# Patient Record
Sex: MALE | Race: WHITE | NOT HISPANIC OR LATINO | Employment: OTHER | URBAN - METROPOLITAN AREA
[De-identification: names, ages, dates, MRNs, and addresses within clinical notes are randomized per-mention and may not be internally consistent; named-entity substitution may affect disease eponyms.]

---

## 2023-08-29 ENCOUNTER — HOSPITAL ENCOUNTER (OUTPATIENT)
Dept: DATA CONVERSION | Facility: HOSPITAL | Age: 83
End: 2023-08-29

## 2023-08-29 DIAGNOSIS — I48.20 CHRONIC ATRIAL FIBRILLATION, UNSPECIFIED (MULTI): ICD-10-CM

## 2023-09-06 ENCOUNTER — HOSPITAL ENCOUNTER (OUTPATIENT)
Dept: DATA CONVERSION | Facility: HOSPITAL | Age: 83
Discharge: HOME | End: 2023-09-06
Payer: MEDICARE

## 2023-09-06 DIAGNOSIS — I48.20 CHRONIC ATRIAL FIBRILLATION, UNSPECIFIED (MULTI): ICD-10-CM

## 2023-09-06 DIAGNOSIS — R73.01 IMPAIRED FASTING GLUCOSE: ICD-10-CM

## 2023-09-06 DIAGNOSIS — I50.9 HEART FAILURE, UNSPECIFIED (MULTI): ICD-10-CM

## 2023-09-06 DIAGNOSIS — M35.3 POLYMYALGIA RHEUMATICA (MULTI): ICD-10-CM

## 2023-09-06 DIAGNOSIS — E78.00 PURE HYPERCHOLESTEROLEMIA, UNSPECIFIED: ICD-10-CM

## 2023-09-06 LAB
ALBUMIN SERPL-MCNC: 4 GM/DL (ref 3.5–5)
ALBUMIN/GLOB SERPL: 1.7 RATIO (ref 1.5–3)
ALP BLD-CCNC: 58 U/L (ref 35–125)
ALT SERPL-CCNC: 12 U/L (ref 5–40)
ANION GAP SERPL CALCULATED.3IONS-SCNC: 15 MMOL/L (ref 0–19)
APPEARANCE PLAS: NORMAL
AST SERPL-CCNC: 17 U/L (ref 5–40)
BILIRUB SERPL-MCNC: 1.1 MG/DL (ref 0.1–1.2)
BUN SERPL-MCNC: 25 MG/DL (ref 8–25)
BUN/CREAT SERPL: 22.7 RATIO (ref 8–21)
CALCIUM SERPL-MCNC: 9.3 MG/DL (ref 8.5–10.4)
CHLORIDE SERPL-SCNC: 107 MMOL/L (ref 97–107)
CHOLEST SERPL-MCNC: 158 MG/DL (ref 133–200)
CHOLEST/HDLC SERPL: 2.5 RATIO
CO2 SERPL-SCNC: 22 MMOL/L (ref 24–31)
COLOR SPUN FLD: YELLOW
CREAT SERPL-MCNC: 1.1 MG/DL (ref 0.4–1.6)
DEPRECATED RDW RBC AUTO: 50.4 FL (ref 37–54)
ERYTHROCYTE [DISTWIDTH] IN BLOOD BY AUTOMATED COUNT: 14.8 % (ref 11.7–15)
FASTING STATUS PATIENT QL REPORTED: NORMAL
GFR SERPL CREATININE-BSD FRML MDRD: 67 ML/MIN/1.73 M2
GLOBULIN SER-MCNC: 2.3 G/DL (ref 1.9–3.7)
GLUCOSE SERPL-MCNC: 106 MG/DL (ref 65–99)
HBA1C MFR BLD: 5.8 % (ref 4–6)
HCT VFR BLD AUTO: 43.3 % (ref 41–50)
HDLC SERPL-MCNC: 63 MG/DL
HGB BLD-MCNC: 14 GM/DL (ref 13.5–16.5)
LDLC SERPL CALC-MCNC: 73 MG/DL (ref 65–130)
MCH RBC QN AUTO: 30 PG (ref 26–34)
MCHC RBC AUTO-ENTMCNC: 32.3 % (ref 31–37)
MCV RBC AUTO: 92.9 FL (ref 80–100)
NRBC BLD-RTO: 0 /100 WBC
NT-PROBNP SERPL-MCNC: 1299 PG/ML (ref 0–852)
PLATELET # BLD AUTO: 211 K/UL (ref 150–450)
PMV BLD AUTO: 9.6 CU (ref 7–12.6)
POTASSIUM SERPL-SCNC: 4.1 MMOL/L (ref 3.4–5.1)
PROT SERPL-MCNC: 6.3 G/DL (ref 5.9–7.9)
RBC # BLD AUTO: 4.66 M/UL (ref 4.5–5.5)
SODIUM SERPL-SCNC: 144 MMOL/L (ref 133–145)
TRIGL SERPL-MCNC: 108 MG/DL (ref 40–150)
WBC # BLD AUTO: 10 K/UL (ref 4.5–11)

## 2023-09-13 PROBLEM — I63.9 STROKE (MULTI): Status: ACTIVE | Noted: 2023-09-13

## 2023-09-13 PROBLEM — I48.20 CHRONIC ATRIAL FIBRILLATION (MULTI): Status: ACTIVE | Noted: 2023-09-13

## 2023-09-13 PROBLEM — M51.36 DDD (DEGENERATIVE DISC DISEASE), LUMBAR: Status: ACTIVE | Noted: 2023-09-13

## 2023-09-13 PROBLEM — K40.20 BILATERAL INGUINAL HERNIA WITHOUT OBSTRUCTION OR GANGRENE: Status: ACTIVE | Noted: 2023-09-13

## 2023-09-13 PROBLEM — E55.9 VITAMIN D DEFICIENCY: Status: ACTIVE | Noted: 2023-09-13

## 2023-09-13 PROBLEM — B35.1 ONYCHOMYCOSIS: Status: ACTIVE | Noted: 2019-01-22

## 2023-09-13 PROBLEM — Z86.73 HISTORY OF TIA (TRANSIENT ISCHEMIC ATTACK): Status: ACTIVE | Noted: 2023-09-13

## 2023-09-13 PROBLEM — J45.51 SEVERE PERSISTENT ASTHMA WITH ACUTE EXACERBATION (MULTI): Status: ACTIVE | Noted: 2023-09-13

## 2023-09-13 PROBLEM — E78.00 PURE HYPERCHOLESTEROLEMIA: Status: ACTIVE | Noted: 2023-09-13

## 2023-09-13 PROBLEM — M51.369 DDD (DEGENERATIVE DISC DISEASE), LUMBAR: Status: ACTIVE | Noted: 2023-09-13

## 2023-09-13 PROBLEM — G25.0 ESSENTIAL TREMOR: Status: ACTIVE | Noted: 2023-09-13

## 2023-09-13 PROBLEM — E78.5 HYPERLIPIDEMIA, UNSPECIFIED: Status: ACTIVE | Noted: 2019-06-20

## 2023-09-13 PROBLEM — N40.1 BPH ASSOCIATED WITH NOCTURIA: Status: ACTIVE | Noted: 2021-08-11

## 2023-09-13 PROBLEM — M19.90 DEGENERATIVE JOINT DISEASE: Status: ACTIVE | Noted: 2023-09-13

## 2023-09-13 PROBLEM — R35.1 BPH ASSOCIATED WITH NOCTURIA: Status: ACTIVE | Noted: 2021-08-11

## 2023-09-13 PROBLEM — M17.0 OSTEOARTHRITIS OF BOTH KNEES: Status: ACTIVE | Noted: 2021-03-18

## 2023-09-13 RX ORDER — VIT A/VIT C/VIT E/ZINC/COPPER 4296-226
1 CAPSULE ORAL 2 TIMES DAILY
COMMUNITY

## 2023-09-13 RX ORDER — ATORVASTATIN CALCIUM 10 MG/1
1 TABLET, FILM COATED ORAL DAILY
COMMUNITY
End: 2024-05-17 | Stop reason: SDUPTHER

## 2023-09-13 RX ORDER — METOPROLOL TARTRATE 50 MG/1
50 TABLET ORAL
COMMUNITY
Start: 2018-06-19 | End: 2023-12-01 | Stop reason: SDUPTHER

## 2023-09-13 RX ORDER — FUROSEMIDE 20 MG/1
20 TABLET ORAL DAILY
COMMUNITY
Start: 2023-08-23 | End: 2023-12-01 | Stop reason: WASHOUT

## 2023-09-13 RX ORDER — VIT C/E/ZN/COPPR/LUTEIN/ZEAXAN 250MG-90MG
25 CAPSULE ORAL DAILY
COMMUNITY

## 2023-09-13 RX ORDER — METOPROLOL TARTRATE 25 MG/1
0.5 TABLET, FILM COATED ORAL 2 TIMES DAILY
COMMUNITY
End: 2024-05-27 | Stop reason: ALTCHOICE

## 2023-10-02 ENCOUNTER — OFFICE VISIT (OUTPATIENT)
Dept: CARDIOLOGY | Facility: CLINIC | Age: 83
End: 2023-10-02
Payer: MEDICARE

## 2023-10-02 VITALS
BODY MASS INDEX: 24.91 KG/M2 | WEIGHT: 211 LBS | HEIGHT: 77 IN | SYSTOLIC BLOOD PRESSURE: 116 MMHG | DIASTOLIC BLOOD PRESSURE: 70 MMHG

## 2023-10-02 DIAGNOSIS — I48.21 PERMANENT ATRIAL FIBRILLATION (MULTI): Primary | ICD-10-CM

## 2023-10-02 PROCEDURE — 99213 OFFICE O/P EST LOW 20 MIN: CPT | Performed by: INTERNAL MEDICINE

## 2023-10-02 PROCEDURE — 1126F AMNT PAIN NOTED NONE PRSNT: CPT | Performed by: INTERNAL MEDICINE

## 2023-10-02 PROCEDURE — 1159F MED LIST DOCD IN RCRD: CPT | Performed by: INTERNAL MEDICINE

## 2023-10-02 RX ORDER — OMEPRAZOLE 20 MG/1
20 TABLET, DELAYED RELEASE ORAL
COMMUNITY
End: 2023-12-01 | Stop reason: WASHOUT

## 2023-10-02 RX ORDER — PREDNISONE 10 MG/1
10 TABLET ORAL DAILY
COMMUNITY
End: 2023-11-02 | Stop reason: ALTCHOICE

## 2023-10-02 RX ORDER — LISINOPRIL 5 MG/1
5 TABLET ORAL DAILY
Qty: 90 TABLET | Refills: 0 | Status: SHIPPED | OUTPATIENT
Start: 2023-10-02 | End: 2024-01-22

## 2023-10-02 RX ORDER — TAMSULOSIN HYDROCHLORIDE 0.4 MG/1
0.4 CAPSULE ORAL
COMMUNITY
End: 2023-12-01 | Stop reason: WASHOUT

## 2023-10-02 ASSESSMENT — ENCOUNTER SYMPTOMS
MUSCULOSKELETAL NEGATIVE: 1
NEUROLOGICAL NEGATIVE: 1
EYES NEGATIVE: 1
ENDOCRINE NEGATIVE: 1
GASTROINTESTINAL NEGATIVE: 1
RESPIRATORY NEGATIVE: 1
CONSTITUTIONAL NEGATIVE: 1

## 2023-10-02 ASSESSMENT — PAIN SCALES - GENERAL: PAINLEVEL: 0-NO PAIN

## 2023-10-02 NOTE — PROGRESS NOTES
Subjective   NATASHA Mckinney is a 82 y.o. male.    Chief Complaint:  No chief complaint on file.    HPIHe got covid and is doing better.  His breathing is doing alright.  The legs are still swelling and is down as long has he is sitting.-  the echo shows the EF is in the 40's and the proBNP is 1300    Review of Systems   Constitutional: Negative.   HENT: Negative.     Eyes: Negative.    Respiratory: Negative.     Endocrine: Negative.    Skin: Negative.    Musculoskeletal: Negative.    Gastrointestinal: Negative.    Genitourinary: Negative.    Neurological: Negative.    All other systems reviewed and are negative.      Objective   Constitutional:       Appearance: Healthy appearance.   Eyes:      Pupils: Pupils are equal, round, and reactive to light.   Neck:      Vascular: JVD normal.   Pulmonary:      Breath sounds: Normal breath sounds.   Cardiovascular:      PMI at left midclavicular line. Normal rate. Irregularly irregular rhythm. Normal S1. Normal S2.       Murmurs: There is no murmur.      No gallop.  No click. No rub.   Pulses:     Intact distal pulses.   Edema:     Peripheral edema absent.   Abdominal:      Palpations: Abdomen is soft.      Tenderness: There is no abdominal tenderness.   Musculoskeletal:      Extremities: No clubbing present.Skin:     General: Skin is warm and dry.   Neurological:      General: No focal deficit present.         Lab Review:   Hospital Outpatient Visit on 09/06/2023   Component Date Value    WBC 09/06/2023 10.0     RBC 09/06/2023 4.66     Hemoglobin 09/06/2023 14.0     Hematocrit 09/06/2023 43.3     MCV 09/06/2023 92.9     MCH 09/06/2023 30.0     MCHC 09/06/2023 32.3     RDW-SD 09/06/2023 50.4     RDW-CV 09/06/2023 14.8     Platelets 09/06/2023 211     MPV 09/06/2023 9.6     nRBC 09/06/2023 0     Calcium 09/06/2023 9.3     AST 09/06/2023 17     Alkaline Phosphatase 09/06/2023 58     Total Bilirubin 09/06/2023 1.1     Total Protein 09/06/2023 6.3     Albumin 09/06/2023 4.0      Globulin, Total 09/06/2023 2.3     A/G Ratio 09/06/2023 1.7     Sodium 09/06/2023 144     Potassium 09/06/2023 4.1     Chloride 09/06/2023 107     Bicarbonate 09/06/2023 22 (L)     Anion Gap 09/06/2023 15     Urea Nitrogen 09/06/2023 25     Creatinine 09/06/2023 1.1     Urea Nitrogen/Creatinine* 09/06/2023 22.7 (H)     Glucose 09/06/2023 106 (H)     ALT (SGPT) 09/06/2023 12     ESTIMATED GFR 09/06/2023 67     Hemoglobin A1C 09/06/2023 5.8     SERUM COLOR 09/06/2023 YELLOW     SERUM CLARITY 09/06/2023 SLIGHTLY LIPEMIC     Is the patient fasting? 09/06/2023 FASTING     Cholesterol 09/06/2023 158     Triglycerides 09/06/2023 108     HDL 09/06/2023 63     LDL Calculated 09/06/2023 73     Cholesterol/HDL Ratio 09/06/2023 2.5    Hospital Outpatient Visit on 09/06/2023   Component Date Value    Pro-BNP 09/06/2023 1,299 (H)      Hospital Outpatient Visit on 09/06/2023   Component Date Value    WBC 09/06/2023 10.0     RBC 09/06/2023 4.66     Hemoglobin 09/06/2023 14.0     Hematocrit 09/06/2023 43.3     MCV 09/06/2023 92.9     MCH 09/06/2023 30.0     MCHC 09/06/2023 32.3     RDW-SD 09/06/2023 50.4     RDW-CV 09/06/2023 14.8     Platelets 09/06/2023 211     MPV 09/06/2023 9.6     nRBC 09/06/2023 0     Calcium 09/06/2023 9.3     AST 09/06/2023 17     Alkaline Phosphatase 09/06/2023 58     Total Bilirubin 09/06/2023 1.1     Total Protein 09/06/2023 6.3     Albumin 09/06/2023 4.0     Globulin, Total 09/06/2023 2.3     A/G Ratio 09/06/2023 1.7     Sodium 09/06/2023 144     Potassium 09/06/2023 4.1     Chloride 09/06/2023 107     Bicarbonate 09/06/2023 22 (L)     Anion Gap 09/06/2023 15     Urea Nitrogen 09/06/2023 25     Creatinine 09/06/2023 1.1     Urea Nitrogen/Creatinine* 09/06/2023 22.7 (H)     Glucose 09/06/2023 106 (H)     ALT (SGPT) 09/06/2023 12     ESTIMATED GFR 09/06/2023 67     Hemoglobin A1C 09/06/2023 5.8     SERUM COLOR 09/06/2023 YELLOW     SERUM CLARITY 09/06/2023 SLIGHTLY LIPEMIC     Is the patient fasting?  09/06/2023 FASTING     Cholesterol 09/06/2023 158     Triglycerides 09/06/2023 108     HDL 09/06/2023 63     LDL Calculated 09/06/2023 73     Cholesterol/HDL Ratio 09/06/2023 2.5    Hospital Outpatient Visit on 09/06/2023   Component Date Value    Pro-BNP 09/06/2023 1,299 (H)    Legacy Encounter on 05/30/2023   Component Date Value    ANTI-NUCLEAR ANTIBODY (A* 05/30/2023  (A)                     Value:POSITIVE  Reference range: NEGATIVE      The Antinuclear Antibody (CHRISTINE) test was performed using    indirect immunofluorescence assay with HEp-2 cells slide.  Test performed at:  [facility] [address] [address] [facility] [address]      CHRISTINE Titer 05/30/2023                      Value:1:160  Reference range: <1:80    Test performed at:  [facility] [address] [facility] [address]      CHRISTINE Pattern 05/30/2023                      Value:HOMOGENEOUS    Test performed at:  [facility] [address] [address] [facility] [address]  Performed at [facility],[address] [facility] [address]      CRP 05/30/2023 5.5 (H)     Differential Type 05/30/2023 AUTO DIFF     Immature Granulocyte %, * 05/30/2023 0.50     Neutrophil 05/30/2023 75.50 (H)     Lymphocytes % 05/30/2023 11.60 (L)     Monocytes % 05/30/2023 11.10 (H)     Eosinophil 05/30/2023 1.00     Basophils % 05/30/2023 0.30     Immature Granulocytes Ab* 05/30/2023 0.06     Neutrophils Absolute 05/30/2023 9.46 (H)     Lymphocytes Absolute 05/30/2023 1.45     Monocytes Absolute 05/30/2023 1.39 (H)     Eosinophils Absolute 05/30/2023 0.12     Basophils Absolute 05/30/2023 0.04     WBC 05/30/2023 12.5 (H)     RBC 05/30/2023 4.17 (L)     Hemoglobin 05/30/2023 11.9 (L)     Hematocrit 05/30/2023 37.1 (L)     MCV 05/30/2023 89.0     MCH 05/30/2023 28.5     MCHC 05/30/2023 32.1     RDW-SD 05/30/2023 39.8     RDW-CV 05/30/2023 12.2     Platelets 05/30/2023 467 (H)     MPV 05/30/2023 8.8     nRBC 05/30/2023 0     ABSOLUTE NEUTROPHIL CALC* 05/30/2023 9.46     Sedimentation Rate 05/30/2023 69 (H)      Hemoglobin A1C 05/30/2023 5.7     Thyroid Stimulating Horm* 05/30/2023 2.04    Legacy Encounter on 05/26/2023   Component Date Value    LH - pH, urine (Data Con* 05/26/2023 5     LH - Performed by (Data * 05/26/2023 JONO     LH - Odor (Data Conversi* 05/26/2023 NON     LH - Nitrite (Data Conve* 05/26/2023 NEG     LH - Lot # (Data Convers* 05/26/2023 [id number]     LH - Leukocytes (Data Co* 05/26/2023 NEG     LH - Ketone (Data Conver* 05/26/2023 NEG     LH - Glucose (Data Conve* 05/26/2023 NRL     LH - Date of Expiration * 05/26/2023 01/31/2024     LH - Color (Data Convers* 05/26/2023 NON     LH - Blood (Data Convers* 05/26/2023 ABOUT 50     LH - Appearance (Data Co* 05/26/2023 CLEAR     LH - Bilirubin (Data Con* 05/26/2023 NEG     LH - Urobilinogen (Data * 05/26/2023 NRL     LH - Time of Test (Data * 05/26/2023 10:20AM     LH - Specific Gravity (D* 05/26/2023 1.025     LH - Protein (Data Conve* 05/26/2023 TRACE    Legacy Encounter on 05/26/2023   Component Date Value    Microscopic 05/26/2023 MANUAL MICROSCOPIC URINES     WBC, Urine 05/26/2023 OCC     RBC, Urine 05/26/2023 NONE SEEN     Color, Urine 05/26/2023 YELLOW     Appearance, Urine 05/26/2023 CLEAR     Specific Gravity, Urine 05/26/2023 >1.040     pH, Urine 05/26/2023 5.5     Leukocyte Esterase, Urine 05/26/2023 NEGATIVE     Nitrite, Urine 05/26/2023 NEGATIVE     Protein, Urine 05/26/2023 50 (A)     Glucose, Urine 05/26/2023 NEGATIVE     Ketones, Urine 05/26/2023 TRACE (A)     Urobilinogen, Urine 05/26/2023 2.0 (H)     Bilirubin, Urine 05/26/2023 NEGATIVE     Blood, Urine 05/26/2023 NEGATIVE     Urine Culture 05/26/2023 CULTURE NOT INDICATED     Crystal -Urine 05/26/2023                      Value:MANY   [facility]  Performed at [address] [address]      WBC 05/26/2023 12.8 (H)     RBC 05/26/2023 4.36 (L)     Hemoglobin 05/26/2023 12.5 (L)     Hematocrit 05/26/2023 39.7 (L)     MCV 05/26/2023 91.1     MCH 05/26/2023 28.7     MCHC  05/26/2023 31.5     RDW-SD 05/26/2023 40.5     RDW-CV 05/26/2023 12.1     Platelets 05/26/2023 465 (H)     MPV 05/26/2023 8.8     nRBC 05/26/2023 0     Calcium 05/26/2023 9.2     AST 05/26/2023 15     Alkaline Phosphatase 05/26/2023 193 (H)     Total Bilirubin 05/26/2023 0.5     Total Protein 05/26/2023 7.3     Albumin 05/26/2023 3.6     Globulin, Total 05/26/2023 3.7     A/G Ratio 05/26/2023 1.0 (L)     Sodium 05/26/2023 137     Potassium 05/26/2023 4.6     Chloride 05/26/2023 101     Bicarbonate 05/26/2023 24     Anion Gap 05/26/2023 12     Urea Nitrogen 05/26/2023 23     Creatinine 05/26/2023 1.0     Urea Nitrogen/Creatinine* 05/26/2023 23.0 (H)     Glucose 05/26/2023 105 (H)     ALT (SGPT) 05/26/2023 13     ESTIMATED GFR 05/26/2023 75     CRP 05/26/2023 6.6 (H)     Sedimentation Rate 05/26/2023 22 (H)        Assessment/Plan   There were no encounter diagnoses.He is doig well and is active.  Will start on lisinopril and take the lasix as needed

## 2023-10-23 DIAGNOSIS — I48.21 PERMANENT ATRIAL FIBRILLATION (MULTI): Primary | ICD-10-CM

## 2023-10-25 RX ORDER — APIXABAN 5 MG/1
5 TABLET, FILM COATED ORAL 2 TIMES DAILY
Qty: 180 TABLET | Refills: 3 | Status: SHIPPED | OUTPATIENT
Start: 2023-10-25 | End: 2024-05-10 | Stop reason: SDUPTHER

## 2023-11-02 DIAGNOSIS — M35.3 PMR (POLYMYALGIA RHEUMATICA) (MULTI): Primary | ICD-10-CM

## 2023-11-02 RX ORDER — PREDNISONE 5 MG/1
5 TABLET ORAL DAILY
Qty: 30 TABLET | Refills: 0 | Status: SHIPPED | OUTPATIENT
Start: 2023-11-02 | End: 2023-12-02

## 2023-11-30 ENCOUNTER — APPOINTMENT (OUTPATIENT)
Dept: PRIMARY CARE | Facility: CLINIC | Age: 83
End: 2023-11-30
Payer: MEDICARE

## 2023-12-01 ENCOUNTER — LAB (OUTPATIENT)
Dept: LAB | Facility: LAB | Age: 83
End: 2023-12-01
Payer: MEDICARE

## 2023-12-01 ENCOUNTER — OFFICE VISIT (OUTPATIENT)
Dept: PRIMARY CARE | Facility: CLINIC | Age: 83
End: 2023-12-01
Payer: MEDICARE

## 2023-12-01 VITALS
DIASTOLIC BLOOD PRESSURE: 62 MMHG | OXYGEN SATURATION: 97 % | WEIGHT: 219 LBS | BODY MASS INDEX: 25.86 KG/M2 | HEART RATE: 102 BPM | HEIGHT: 77 IN | TEMPERATURE: 97.2 F | SYSTOLIC BLOOD PRESSURE: 118 MMHG

## 2023-12-01 DIAGNOSIS — N40.0 BENIGN PROSTATIC HYPERPLASIA WITHOUT URINARY OBSTRUCTION: ICD-10-CM

## 2023-12-01 DIAGNOSIS — M15.9 PRIMARY OSTEOARTHRITIS INVOLVING MULTIPLE JOINTS: ICD-10-CM

## 2023-12-01 DIAGNOSIS — I50.22 CHRONIC SYSTOLIC CONGESTIVE HEART FAILURE (MULTI): ICD-10-CM

## 2023-12-01 DIAGNOSIS — M35.3 POLYMYALGIA RHEUMATICA (MULTI): ICD-10-CM

## 2023-12-01 DIAGNOSIS — R73.01 IMPAIRED FASTING GLUCOSE: ICD-10-CM

## 2023-12-01 DIAGNOSIS — E78.2 MIXED HYPERLIPIDEMIA: ICD-10-CM

## 2023-12-01 DIAGNOSIS — I48.20 CHRONIC ATRIAL FIBRILLATION (MULTI): ICD-10-CM

## 2023-12-01 DIAGNOSIS — I10 BENIGN ESSENTIAL HYPERTENSION: Primary | ICD-10-CM

## 2023-12-01 PROBLEM — M19.90 CHRONIC ARTHRITIS: Status: ACTIVE | Noted: 2023-12-01

## 2023-12-01 PROBLEM — R26.89 IMPAIRMENT OF BALANCE: Status: ACTIVE | Noted: 2023-12-01

## 2023-12-01 PROBLEM — M67.919 DISORDER OF ROTATOR CUFF: Status: ACTIVE | Noted: 2023-12-01

## 2023-12-01 PROBLEM — N42.9 DISORDER OF PROSTATE: Status: ACTIVE | Noted: 2023-12-01

## 2023-12-01 PROBLEM — L30.9 ECZEMA: Status: ACTIVE | Noted: 2023-12-01

## 2023-12-01 PROBLEM — G95.9 DISEASE OF SPINAL CORD (MULTI): Status: ACTIVE | Noted: 2023-05-31

## 2023-12-01 PROBLEM — M70.22 OLECRANON BURSITIS OF LEFT ELBOW: Status: ACTIVE | Noted: 2020-07-22

## 2023-12-01 PROBLEM — H90.3 SENSORINEURAL HEARING LOSS (SNHL) OF BOTH EARS: Status: ACTIVE | Noted: 2023-12-01

## 2023-12-01 PROBLEM — H26.9 CATARACT: Status: ACTIVE | Noted: 2023-12-01

## 2023-12-01 PROBLEM — I50.9 CONGESTIVE HEART FAILURE (MULTI): Status: ACTIVE | Noted: 2023-12-01

## 2023-12-01 PROBLEM — M25.569 KNEE PAIN: Status: ACTIVE | Noted: 2023-12-01

## 2023-12-01 PROBLEM — E78.5 DYSLIPIDEMIA: Status: ACTIVE | Noted: 2023-12-01

## 2023-12-01 LAB
ALBUMIN SERPL-MCNC: 4.1 G/DL (ref 3.5–5)
ALP BLD-CCNC: 82 U/L (ref 35–125)
ALT SERPL-CCNC: 13 U/L (ref 5–40)
ANION GAP SERPL CALC-SCNC: 11 MMOL/L
AST SERPL-CCNC: 16 U/L (ref 5–40)
BILIRUB SERPL-MCNC: 1 MG/DL (ref 0.1–1.2)
BUN SERPL-MCNC: 16 MG/DL (ref 8–25)
CALCIUM SERPL-MCNC: 9.3 MG/DL (ref 8.5–10.4)
CHLORIDE SERPL-SCNC: 101 MMOL/L (ref 97–107)
CHOLEST SERPL-MCNC: 148 MG/DL (ref 133–200)
CHOLEST/HDLC SERPL: 2.6 {RATIO}
CO2 SERPL-SCNC: 27 MMOL/L (ref 24–31)
CREAT SERPL-MCNC: 1 MG/DL (ref 0.4–1.6)
CRP SERPL-MCNC: <0.3 MG/DL (ref 0–2)
ERYTHROCYTE [DISTWIDTH] IN BLOOD BY AUTOMATED COUNT: 13 % (ref 11.5–14.5)
ERYTHROCYTE [SEDIMENTATION RATE] IN BLOOD BY WESTERGREN METHOD: 5 MM/H (ref 0–20)
EST. AVERAGE GLUCOSE BLD GHB EST-MCNC: 114 MG/DL
GFR SERPL CREATININE-BSD FRML MDRD: 75 ML/MIN/1.73M*2
GLUCOSE SERPL-MCNC: 105 MG/DL (ref 65–99)
HBA1C MFR BLD: 5.6 %
HCT VFR BLD AUTO: 43.3 % (ref 41–52)
HDLC SERPL-MCNC: 57 MG/DL
HGB BLD-MCNC: 13.7 G/DL (ref 13.5–17.5)
LDLC SERPL CALC-MCNC: 75 MG/DL (ref 65–130)
MCH RBC QN AUTO: 29.7 PG (ref 26–34)
MCHC RBC AUTO-ENTMCNC: 31.6 G/DL (ref 32–36)
MCV RBC AUTO: 94 FL (ref 80–100)
NRBC BLD-RTO: 0 /100 WBCS (ref 0–0)
PLATELET # BLD AUTO: 287 X10*3/UL (ref 150–450)
POTASSIUM SERPL-SCNC: 4.5 MMOL/L (ref 3.4–5.1)
PROT SERPL-MCNC: 6.6 G/DL (ref 5.9–7.9)
RBC # BLD AUTO: 4.61 X10*6/UL (ref 4.5–5.9)
SODIUM SERPL-SCNC: 139 MMOL/L (ref 133–145)
TRIGL SERPL-MCNC: 80 MG/DL (ref 40–150)
WBC # BLD AUTO: 8.7 X10*3/UL (ref 4.4–11.3)

## 2023-12-01 PROCEDURE — 85652 RBC SED RATE AUTOMATED: CPT

## 2023-12-01 PROCEDURE — 80061 LIPID PANEL: CPT

## 2023-12-01 PROCEDURE — 3078F DIAST BP <80 MM HG: CPT | Performed by: INTERNAL MEDICINE

## 2023-12-01 PROCEDURE — 1159F MED LIST DOCD IN RCRD: CPT | Performed by: INTERNAL MEDICINE

## 2023-12-01 PROCEDURE — 36415 COLL VENOUS BLD VENIPUNCTURE: CPT

## 2023-12-01 PROCEDURE — 86140 C-REACTIVE PROTEIN: CPT

## 2023-12-01 PROCEDURE — 99214 OFFICE O/P EST MOD 30 MIN: CPT | Performed by: INTERNAL MEDICINE

## 2023-12-01 PROCEDURE — 1036F TOBACCO NON-USER: CPT | Performed by: INTERNAL MEDICINE

## 2023-12-01 PROCEDURE — 83036 HEMOGLOBIN GLYCOSYLATED A1C: CPT

## 2023-12-01 PROCEDURE — 85027 COMPLETE CBC AUTOMATED: CPT

## 2023-12-01 PROCEDURE — 80053 COMPREHEN METABOLIC PANEL: CPT

## 2023-12-01 PROCEDURE — 1125F AMNT PAIN NOTED PAIN PRSNT: CPT | Performed by: INTERNAL MEDICINE

## 2023-12-01 PROCEDURE — 3074F SYST BP LT 130 MM HG: CPT | Performed by: INTERNAL MEDICINE

## 2023-12-01 RX ORDER — KETOCONAZOLE 20 MG/G
1 CREAM TOPICAL 2 TIMES DAILY
COMMUNITY
Start: 2023-10-17 | End: 2024-03-20 | Stop reason: WASHOUT

## 2023-12-01 ASSESSMENT — ENCOUNTER SYMPTOMS
OCCASIONAL FEELINGS OF UNSTEADINESS: 1
DEPRESSION: 0
SHORTNESS OF BREATH: 0
LOSS OF SENSATION IN FEET: 0
PALPITATIONS: 0

## 2023-12-01 ASSESSMENT — PATIENT HEALTH QUESTIONNAIRE - PHQ9
2. FEELING DOWN, DEPRESSED OR HOPELESS: NOT AT ALL
1. LITTLE INTEREST OR PLEASURE IN DOING THINGS: NOT AT ALL
SUM OF ALL RESPONSES TO PHQ9 QUESTIONS 1 AND 2: 0

## 2023-12-01 ASSESSMENT — PAIN SCALES - GENERAL: PAINLEVEL: 8

## 2023-12-01 NOTE — PROGRESS NOTES
[facility]: [facility]  PROGRESS NOTE      Qamar Mckinney is a 83 y.o. male that is presenting today for Follow-up.    Assessment/Plan   Diagnoses and all orders for this visit:  Benign essential hypertension  Comments:  BP doing well.  Chronic atrial fibrillation (CMS/HCC)  Comments:  Eliquis  Impaired fasting glucose  -     CBC; Future  -     Hemoglobin A1C; Future  Benign prostatic hyperplasia without urinary obstruction  Comments:  Off flomax doing OK.  Polymyalgia rheumatica (CMS/HCC)  Comments:  Decrease Prednisone to 2.5 mg = 1/2 of 5 mg for 8 days then stop. Check inflammatory markers. Doing well.  Orders:  -     C-reactive protein; Future  -     Sedimentation Rate; Future  Primary osteoarthritis involving multiple joints  Comments:  Needs knee replaced.  Mixed hyperlipidemia  Comments:  recheck labs.  Orders:  -     Comprehensive Metabolic Panel; Future  -     Lipid Panel; Future  Chronic systolic congestive heart failure (CMS/HCC)  Comments:  OK to start Lisinopril 1/2 of 5 mg daily= 2.5 mg for 1-2 weeks then increase to 5 mg.  Other orders  -     Follow Up In Primary Care - Established; Future    Subjective   Knees much worse now down on prednisone. Back stable, not great. 9/23 COVID. COVID 19 1/24 RSV, flu vaccines done. NO PMR issues. CHF mild  lisinopril 5 mg lets go with 2.5 mg increase to 5 mg to make sure tolerates it.      Review of Systems   Respiratory:  Negative for shortness of breath.    Cardiovascular:  Negative for chest pain and palpitations.   All other systems reviewed and are negative.     Objective   Vitals:    12/01/23 1542   BP: 118/62   Pulse: 102   Temp: 36.2 °C (97.2 °F)   SpO2: 97%      Body mass index is 25.97 kg/m².  Physical Exam  Constitutional:       General: He is not in acute distress.  HENT:      Head: Normocephalic and atraumatic.      Right Ear: Tympanic membrane normal.      Left Ear: Tympanic membrane normal.      Mouth/Throat:      Mouth:  "Mucous membranes are moist.      Pharynx: Oropharynx is clear.   Eyes:      Extraocular Movements: Extraocular movements intact.      Conjunctiva/sclera: Conjunctivae normal.      Pupils: Pupils are equal, round, and reactive to light.   Cardiovascular:      Rate and Rhythm: Normal rate and regular rhythm.   Pulmonary:      Breath sounds: Normal breath sounds.   Abdominal:      General: Bowel sounds are normal.      Palpations: Abdomen is soft. There is no mass.   Musculoskeletal:         General: Tenderness (severe OA. B) present. Normal range of motion.      Cervical back: Neck supple. No tenderness.      Right lower leg: Edema (2+) present.      Left lower leg: Edema (1+) present.   Skin:     General: Skin is warm and dry.   Neurological:      General: No focal deficit present.      Mental Status: He is oriented to person, place, and time.       Diagnostic Results   Lab Results   Component Value Date    GLUCOSE 106 (H) 09/06/2023    CALCIUM 9.3 09/06/2023     09/06/2023    K 4.1 09/06/2023    CO2 22 (L) 09/06/2023     09/06/2023    BUN 25 09/06/2023    CREATININE 1.1 09/06/2023     Lab Results   Component Value Date    ALT 12 09/06/2023    AST 17 09/06/2023    ALKPHOS 58 09/06/2023    BILITOT 1.1 09/06/2023     Lab Results   Component Value Date    WBC 10.0 09/06/2023    HGB 14.0 09/06/2023    HCT 43.3 09/06/2023    MCV 92.9 09/06/2023     09/06/2023     Lab Results   Component Value Date    CHOL 158 09/06/2023    CHOL 143 02/16/2023    CHOL 144 08/04/2021     Lab Results   Component Value Date    HDL 63 09/06/2023    HDL 45 02/16/2023    HDL 48 08/04/2021     Lab Results   Component Value Date    LDLCALC 73 09/06/2023    LDLCALC 82 02/16/2023    LDLCALC 83 08/04/2021     Lab Results   Component Value Date    TRIG 108 09/06/2023    TRIG 79 02/16/2023    TRIG 67 08/04/2021     No components found for: \"CHOLHDL\"  Lab Results   Component Value Date    HGBA1C 5.8 09/06/2023     Other labs not " included in the list above were reviewed either before or during this encounter.    History    Past Medical History:   Diagnosis Date    Benign essential hypertension 04/04/2022    Benign prostatic hyperplasia without urinary obstruction 12/01/2023    CHF (congestive heart failure) (CMS/HCC)     Chronic atrial fibrillation (CMS/HCC) 09/13/2023    Congestive heart failure (CMS/HCC) 12/01/2023    EF 55% 4/22 borderline dilation.    DDD (degenerative disc disease), lumbar     Degenerative joint disease 09/13/2023    Eczema     Essential tremor     Hypercholesteremia     Hyperlipidemia     IFG (impaired fasting glucose)     Myelopathy (CMS/[facility])     Onychomycosis     Osteoarthritis     Other conditions influencing health status     Post Varicella    Other conditions influencing health status     Nephrolithiasis    Paroxysmal A-fib (CMS/[facility])     Polymyalgia rheumatica (CMS/HCC)     Pure hypercholesterolemia 09/13/2023    Spondylosis without myelopathy or radiculopathy, lumbosacral region     TIA (transient ischemic attack)     Unspecified abdominal hernia without obstruction or gangrene     Hernia     Past Surgical History:   Procedure Laterality Date    OTHER SURGICAL HISTORY  04/02/2021    Hernia repair     Family History   Problem Relation Name Age of Onset    No Known Problems Mother      Colon cancer Father       Social History     Socioeconomic History    Marital status:      Spouse name: Not on file    Number of children: Not on file    Years of education: Not on file    Highest education level: Not on file   Occupational History    Not on file   Tobacco Use    Smoking status: Never    Smokeless tobacco: Never   Vaping Use    Vaping Use: Never used   Substance and Sexual Activity    Alcohol use: Yes     Comment: socially    Drug use: Never    Sexual activity: Not on file   Other Topics Concern    Not on file   Social History Narrative    Not on file     Social Determinants of Health     Financial Resource  Strain: Not on file   Food Insecurity: Not on file   Transportation Needs: Not on file   Physical Activity: Not on file   Stress: Not on file   Social Connections: Not on file   Intimate Partner Violence: Not on file   Housing Stability: Not on file     Allergies   Allergen Reactions    Furosemide Other     Lightheadedness, BP Drop     Current Outpatient Medications on File Prior to Visit   Medication Sig Dispense Refill    atorvastatin (Lipitor) 10 mg tablet Take 1 tablet (10 mg) by mouth once daily.      cholecalciferol (Vitamin D-3) 25 MCG (1000 UT) capsule Take by mouth. TAKE AS DIRECTED      Eliquis 5 mg tablet TAKE 1 TABLET TWICE A DAY AS DIRECTED 180 tablet 3    ketoconazole (NIZOral) 2 % cream Apply 1 Application topically 2 times a day.      metoprolol tartrate (Lopressor) 25 mg tablet Take 1 tablet (25 mg) by mouth 2 times a day.      predniSONE (Deltasone) 5 mg tablet Take 1 tablet (5 mg) by mouth once daily. 30 tablet 0    vitamins A,C,E-zinc-copper (ICaps AREDS) 4,296 mcg-226 mg-90 mg capsule Take by mouth. TAKE AS DIRECTED      lisinopril 5 mg tablet Take 1 tablet (5 mg) by mouth once daily. (Patient not taking: Reported on 12/1/2023) 90 tablet 0    [DISCONTINUED] furosemide (Lasix) 20 mg tablet Take 1 tablet (20 mg) by mouth once daily.      [DISCONTINUED] metoprolol tartrate (Lopressor) 50 mg tablet Take 1 tablet by mouth.      [DISCONTINUED] omeprazole OTC (PriLOSEC OTC) 20 mg EC tablet Take 1 tablet (20 mg) by mouth once daily in the morning. Take before meals. Do not crush, chew, or split.      [DISCONTINUED] tamsulosin (Flomax) 0.4 mg 24 hr capsule Take 1 capsule (0.4 mg) by mouth once daily in the morning. Take before meals.       No current facility-administered medications on file prior to visit.     Immunization History   Administered Date(s) Administered    Flu vaccine, quadrivalent, high-dose, preservative free, age 65y+ (FLUZONE) 10/26/2020, 11/20/2023    Influenza, High Dose Seasonal,  Preservative Free 10/24/2018    Influenza, Seasonal, Quadrivalent, Adjuvanted 10/25/2021    Influenza, Unspecified 09/28/2009, 10/24/2018    Influenza, injectable, quadrivalent 09/26/2019    Influenza, seasonal, injectable 09/08/2015, 09/21/2015    Moderna COVID-19 vaccine, bivalent, blue cap/gray label *Check age/dose* 09/28/2022    Pfizer Purple Cap SARS-CoV-2 01/28/2021, 02/25/2021, 10/04/2021    Pneumococcal polysaccharide vaccine, 23-valent, age 2 years and older (PNEUMOVAX 23) 09/02/2013, 08/08/2014, 06/19/2018    RESPIRATORY SYNCYTIAL VIRUS (RSV), ELIGIBLE PREGNANT PTS, 0.5 ML (ABRYSVO) 10/24/2023    Zoster vaccine, recombinant, adult (SHINGRIX) 12/11/2018, 02/18/2019    Zoster, live 05/13/2022     Patient's medical history was reviewed and updated either before or during this encounter.       Waqas Fernandez MD

## 2023-12-01 NOTE — PATIENT INSTRUCTIONS
Diagnoses and all orders for this visit:  Benign essential hypertension  Comments:  BP doing well.  Chronic atrial fibrillation (CMS/[facility])  Comments:  Eliquis  Impaired fasting glucose  -     CBC; Future  -     Hemoglobin A1C; Future  Benign prostatic hyperplasia without urinary obstruction  Comments:  Off flomax doing OK.  Polymyalgia rheumatica (CMS/[facility])  Comments:  Decrease Prednisone to 2.5 mg = 1/2 of 5 mg for 8 days then stop. Check inflammatory markers. Doing well.  Orders:  -     C-reactive protein; Future  -     Sedimentation Rate; Future  Primary osteoarthritis involving multiple joints  Comments:  Needs knee replaced.  Mixed hyperlipidemia  Comments:  recheck labs.  Orders:  -     Comprehensive Metabolic Panel; Future  -     Lipid Panel; Future  Chronic systolic congestive heart failure (CMS/[facility])  Comments:  OK to start Lisinopril 1/2 of 5 mg daily= 2.5 mg for 1-2 weeks then increase to 5 mg.  Other orders  -     Follow Up In Primary Care - Established; Future

## 2023-12-04 ENCOUNTER — TELEPHONE (OUTPATIENT)
Dept: PRIMARY CARE | Facility: CLINIC | Age: 83
End: 2023-12-04
Payer: MEDICARE

## 2023-12-04 NOTE — TELEPHONE ENCOUNTER
"Patient called regarding his weight that was on his visit summary from Friday. States it says, 219. Patient claims he weighed himself twice over the weekend and he was closer to 209. Asking if this can be changed \"so it doesn't look like I lost 10 pounds at my last visit\"  "

## 2023-12-06 NOTE — TELEPHONE ENCOUNTER
Spoke with patient and advised that since the actual note from the visit has already been signed and locked there couldn't;t be any changes made to it however an addendum could be made noting that the number was an error and was agreeable, stated thanks and understanding

## 2024-01-04 ENCOUNTER — OFFICE VISIT (OUTPATIENT)
Dept: ORTHOPEDIC SURGERY | Facility: CLINIC | Age: 84
End: 2024-01-04
Payer: MEDICARE

## 2024-01-04 ENCOUNTER — ANCILLARY PROCEDURE (OUTPATIENT)
Dept: RADIOLOGY | Facility: CLINIC | Age: 84
End: 2024-01-04
Payer: MEDICARE

## 2024-01-04 DIAGNOSIS — M17.11 PRIMARY OSTEOARTHRITIS OF RIGHT KNEE: Primary | ICD-10-CM

## 2024-01-04 DIAGNOSIS — M25.561 RIGHT KNEE PAIN, UNSPECIFIED CHRONICITY: ICD-10-CM

## 2024-01-04 PROCEDURE — 73564 X-RAY EXAM KNEE 4 OR MORE: CPT | Mod: RT

## 2024-01-04 PROCEDURE — 73564 X-RAY EXAM KNEE 4 OR MORE: CPT | Mod: RIGHT SIDE | Performed by: RADIOLOGY

## 2024-01-04 PROCEDURE — 1036F TOBACCO NON-USER: CPT | Performed by: ORTHOPAEDIC SURGERY

## 2024-01-04 PROCEDURE — 1159F MED LIST DOCD IN RCRD: CPT | Performed by: ORTHOPAEDIC SURGERY

## 2024-01-04 PROCEDURE — 99214 OFFICE O/P EST MOD 30 MIN: CPT | Performed by: ORTHOPAEDIC SURGERY

## 2024-01-04 PROCEDURE — 1125F AMNT PAIN NOTED PAIN PRSNT: CPT | Performed by: ORTHOPAEDIC SURGERY

## 2024-01-04 ASSESSMENT — PAIN - FUNCTIONAL ASSESSMENT: PAIN_FUNCTIONAL_ASSESSMENT: 0-10

## 2024-01-04 ASSESSMENT — PAIN SCALES - GENERAL: PAINLEVEL_OUTOF10: 3

## 2024-01-04 NOTE — PROGRESS NOTES
Patient is a 83-year-old gentleman with known advanced osteoarthritis of his right knee.  He presents today for discussion of possible right total knee arthroplasty.  He is failed a greater than 3-month trial of reasonable conservative treatment including viscosupplementation, radiofrequency ablation, bracing, ambulatory aids, ice and Tylenol.  He cannot take anti-inflammatories.  Is on Eliquis for atrial fibrillation.    Right knee:  Chief Complaint   Patient presents with    Right Knee - New Patient Visit         There were no vitals filed for this visit.    Past Medical History:   Diagnosis Date    Benign essential hypertension 04/04/2022    Benign prostatic hyperplasia without urinary obstruction 12/01/2023    CHF (congestive heart failure) (CMS/[facility])     Chronic atrial fibrillation (CMS/[facility]) 09/13/2023    Congestive heart failure (CMS/[facility]) 12/01/2023    EF 55% 4/22 borderline dilation.    DDD (degenerative disc disease), lumbar     Degenerative joint disease 09/13/2023    Eczema     Essential tremor     Hypercholesteremia     Hyperlipidemia     IFG (impaired fasting glucose)     Myelopathy (CMS/[facility])     Onychomycosis     Osteoarthritis     Other conditions influencing health status     Post Varicella    Other conditions influencing health status     Nephrolithiasis    Paroxysmal A-fib (CMS/[facility])     Polymyalgia rheumatica (CMS/[facility])     Pure hypercholesterolemia 09/13/2023    Spondylosis without myelopathy or radiculopathy, lumbosacral region     TIA (transient ischemic attack)     Unspecified abdominal hernia without obstruction or gangrene     Hernia     Patient Active Problem List   Diagnosis    Bilateral inguinal hernia without obstruction or gangrene    BPH associated with nocturia    DDD (degenerative disc disease), lumbar    Degenerative joint disease    Essential tremor    History of TIA (transient ischemic attack)    Hyperlipidemia, unspecified    Onychomycosis    Osteoarthritis of both knees    Chronic  atrial fibrillation (CMS/HCC)    Pure hypercholesterolemia    Stroke (CMS/HCC)    Vitamin D deficiency    Benign essential hypertension    Benign prostatic hyperplasia without urinary obstruction    Cataract    Chronic arthritis    Congestive heart failure (CMS/HCC)    Disease of spinal cord (CMS/HCC)    Disorder of prostate    Dyslipidemia    Eczema    Impaired fasting glucose    Impairment of balance    Disorder of rotator cuff    Knee pain    Olecranon bursitis of left elbow    Polymyalgia rheumatica (CMS/HCC)    Sensorineural hearing loss (SNHL) of both ears       Medication Documentation Review Audit       Reviewed by Pamela Bravo LPN (Licensed Nurse) on 24 at 1114      Medication Order Taking? Sig Documenting Provider Last Dose Status   atorvastatin (Lipitor) 10 mg tablet [id number] No Take 1 tablet (10 mg) by mouth once daily. Historical Provider, MD Taking Active   cholecalciferol (Vitamin D-3) 25 MCG (1000 UT) capsule [id number] No Take by mouth. TAKE AS DIRECTED Historical Provider, MD Taking Active   Eliquis 5 mg tablet [id number] No TAKE 1 TABLET TWICE A DAY AS DIRECTED Silvano Castaneda MD Taking Active   ketoconazole (NIZOral) 2 % cream [id number] No Apply 1 Application topically 2 times a day. Historical Provider, MD Taking Active   lisinopril 5 mg tablet [id number] No Take 1 tablet (5 mg) by mouth once daily.   Patient not taking: Reported on 2023    Silvano Castaneda MD Not Taking  23 2239   metoprolol tartrate (Lopressor) 25 mg tablet [id number] No Take 1 tablet (25 mg) by mouth 2 times a day. Historical Provider, MD Taking Active   vitamins A,C,E-zinc-copper (ICaps AREDS) 4,296 mcg-226 mg-90 mg capsule [id number] No Take by mouth. TAKE AS DIRECTED Historical Provider, MD Taking Active                    Allergies   Allergen Reactions    Furosemide Other     Lightheadedness, BP Drop       Social History     Socioeconomic History    Marital status:      Spouse name: Not  on file    Number of children: Not on file    Years of education: Not on file    Highest education level: Not on file   Occupational History    Not on file   Tobacco Use    Smoking status: Never    Smokeless tobacco: Never   Vaping Use    Vaping Use: Never used   Substance and Sexual Activity    Alcohol use: Yes     Comment: socially    Drug use: Never    Sexual activity: Not on file   Other Topics Concern    Not on file   Social History Narrative    Not on file     Social Determinants of Health     Financial Resource Strain: Not on file   Food Insecurity: Not on file   Transportation Needs: Not on file   Physical Activity: Not on file   Stress: Not on file   Social Connections: Not on file   Intimate Partner Violence: Not on file   Housing Stability: Not on file       Past Surgical History:   Procedure Laterality Date    OTHER SURGICAL HISTORY  04/02/2021    Hernia repair       There is no height or weight on file to calculate BMI.    AAOx3, NAD, walks with a moderate antalgic gait  valgus allignment  Range of motion lacks 5 degrees of full extension and flexes to 115 degrees  Stable to varus/valgus/anterior/posterior stress through out the range of motion  Slight laxity with valgus stress  Diffuse lateral joint line tenderness to palpation  Moderate effusion  SILT in a steph/saph/per/tib distribution  5/5 knee extension/df/pf/ehl  ½ dorsalis pedis and posterior tibial pulse  no popliteal lymphadenopathy  no other overlying lesions  mood: euthymic  Respirations non labored    Plain films were reviewed by myself in clinic today.  There is advanced osteoarthritis of the knee with significant joint space narrowing, underlying sclerosis and tricompartmental osteophytic change.    Patient is now failed a greater than 3-month trial of reasonable conservative treatment and wishes to proceed with total knee arthroplasty which is located at this time.  We discussed the risk benefits alternatives to surgery.  All the questions  were answered.    Procedure: right total knee  Location: [facility]  ICD10: M17.11  CPT: 34737  Stay: overnight  Equipment: Depuy Attune Primary    I talked with the patient at length about risks, limitations, benefits and alternatives to total knee replacement today. I reviewed concerns about implant wear, loosening, breakage, infection and infection prophylaxis, DVT, PE, death and other medical and anesthetic complications of surgery. We talked about the potential for persistent pain following surgery since there are many possible causes for knee and leg pain. The patient was advised that knee replacement will only relieve pain that is coming from the knee. We talked about limited range of motion following knee replacement and the importance of physical therapy and their motivation. We talked about improvements in pain management post-operatively and our accelerated rehab program. We talked about wound healing issues and neurovascular complications of surgery. I reviewed functional and activity restrictions in detail. We discussed the possible need for a homologous blood transfusion. We discussed the fact that many of our patients are able to go home in 1 day or less depending on their health, mobility, pre-op preparation, individual home situation and personal preference.  The patient has identified their personal goals of their joint replacement surgery and recovery and we have discussed them. These are documented in our [employer] patient engagement platform. In addition, we have discussed the advantages and disadvantages of various implant and fixation options, as well as various surgical approaches.  The basic concepts of the joint replacement procedure has been reviewed with the patient and the patient has been provided the opportunity to see an actual implant either in the office or in our pre-op education class.  All of the patients questions were answered. The patient can call my office to schedule surgery  and the pre-op teaching class. I told the patient that they should contact their primary care physician to discuss fitness for surgery.    This note was created using voice recognition software and was not corrected for typographical or grammatical errors.

## 2024-01-05 ENCOUNTER — OFFICE VISIT (OUTPATIENT)
Dept: OTOLARYNGOLOGY | Facility: CLINIC | Age: 84
End: 2024-01-05
Payer: MEDICARE

## 2024-01-05 VITALS — WEIGHT: 210 LBS | HEIGHT: 77 IN | BODY MASS INDEX: 24.79 KG/M2

## 2024-01-05 DIAGNOSIS — H90.3 SENSORINEURAL HEARING LOSS (SNHL) OF BOTH EARS: ICD-10-CM

## 2024-01-05 DIAGNOSIS — H61.23 BILATERAL IMPACTED CERUMEN: Primary | ICD-10-CM

## 2024-01-05 PROCEDURE — 99213 OFFICE O/P EST LOW 20 MIN: CPT | Performed by: OTOLARYNGOLOGY

## 2024-01-05 PROCEDURE — 1159F MED LIST DOCD IN RCRD: CPT | Performed by: OTOLARYNGOLOGY

## 2024-01-05 PROCEDURE — 1036F TOBACCO NON-USER: CPT | Performed by: OTOLARYNGOLOGY

## 2024-01-05 PROCEDURE — 1125F AMNT PAIN NOTED PAIN PRSNT: CPT | Performed by: OTOLARYNGOLOGY

## 2024-01-05 NOTE — PROGRESS NOTES
"Chief Complaint   Patient presents with    Cerumen Impaction     LOV: 5/2023 EAR CLEANING       Date of Evaluation: 1/5/2024   HPI  Qamar Mckinney \"Toni\" is a 83 y.o. male  with sensorineural hearing loss. Both ears feel plugged. History of recurrent cerumen impactions        Past Medical History:   Diagnosis Date    Benign essential hypertension 04/04/2022    Benign prostatic hyperplasia without urinary obstruction 12/01/2023    CHF (congestive heart failure) (CMS/[facility])     Chronic atrial fibrillation (CMS/[facility]) 09/13/2023    Congestive heart failure (CMS/[facility]) 12/01/2023    EF 55% 4/22 borderline dilation.    DDD (degenerative disc disease), lumbar     Degenerative joint disease 09/13/2023    Eczema     Essential tremor     Hypercholesteremia     Hyperlipidemia     IFG (impaired fasting glucose)     Myelopathy (CMS/[facility])     Onychomycosis     Osteoarthritis     Other conditions influencing health status     Post Varicella    Other conditions influencing health status     Nephrolithiasis    Paroxysmal A-fib (CMS/HCC)     Polymyalgia rheumatica (CMS/HCC)     Pure hypercholesterolemia 09/13/2023    Spondylosis without myelopathy or radiculopathy, lumbosacral region     TIA (transient ischemic attack)     Unspecified abdominal hernia without obstruction or gangrene     Hernia      Past Surgical History:   Procedure Laterality Date    OTHER SURGICAL HISTORY  04/02/2021    Hernia repair          Medications:   Current Outpatient Medications   Medication Instructions    atorvastatin (Lipitor) 10 mg tablet 1 tablet, oral, Daily    cholecalciferol (Vitamin D-3) 25 MCG (1000 UT) capsule oral, TAKE AS DIRECTED    Eliquis 5 mg, oral, 2 times daily, AS DIRECTED    ketoconazole (NIZOral) 2 % cream 1 Application, Topical, 2 times daily    lisinopril 5 mg, oral, Daily    metoprolol tartrate (Lopressor) 25 mg tablet 1 tablet, oral, 2 times daily    vitamins A,C,E-zinc-copper (ICaps AREDS) 4,296 mcg-226 mg-90 mg capsule oral, TAKE AS " "DIRECTED        Allergies:  Allergies   Allergen Reactions    Furosemide Other     Lightheadedness, BP Drop        Physical Exam:  Last Recorded Vitals  Height 1.956 m (6' 5\"), weight 95.3 kg (210 lb).  []General appearance: Well-developed, well-nourished in no acute distress, conversant with normal voice quality    Head/face: No erythema or edema or facial tenderness, and normal facial nerve function bilaterally    External ear: Clear external auditory canals with normal pinnae bilateral large cerumen impactions obstructing the entire ear canal.  Removed with curettes under microscope  Tube status: N/A  Middle ear: Tympanic membranes intact and mobile, middle ears normal.  Tympanic membrane perforation: N/A  Mastoid bowl: N/A  Hearing: Normal conversational awareness at normal speech thresholds    Nose visualized using: Anterior rhinoscopy  Nasal dorsum: Nontraumatic midline appearance  Septum: Midline, nonobstructing  Inferior turbinates: Normal, pink  Secretions: Dry    Oral cavity and oropharynx: Normal  Teeth: Good condition  Floor of mouth: without lesions  Palate: Normal hard palate, soft palate and uvula  Oropharynx: Clear, no lesions present  Buccal mucosa: Normal without masses or lesions  Lips: Normal    Nasopharynx: Inadequate mirror exam secondary to gag/anatomy    Neck:  Salivary glands: Normal bilateral parotid and submandibular glands by inspection and palpation.  Non-thyroid masses: No palpable masses or significant lymphadenopathy  Trachea: Midline  Thyroid: No thyromegaly or palpable nodules  Temporomandibular joint: Nontender  Cervical range of motion: Normal    Neurologic exam: Alert and oriented x3, appropriate affect.  Cranial nerves II-XII normal bilaterally  Extraocular movement: Extraocular movement intact, normal gaze alignment        Qamar was seen today for cerumen impaction.  Diagnoses and all orders for this visit:  Bilateral impacted cerumen (Primary)  Sensorineural hearing loss (SNHL) " of both ears       PLAN  Recommend hearing aid evaluation.  Removal of cerumen.    Madhu Mac MD

## 2024-01-09 ENCOUNTER — HOSPITAL ENCOUNTER (OUTPATIENT)
Facility: HOSPITAL | Age: 84
Setting detail: OUTPATIENT SURGERY
End: 2024-01-09
Attending: ORTHOPAEDIC SURGERY | Admitting: ORTHOPAEDIC SURGERY
Payer: MEDICARE

## 2024-01-09 PROBLEM — M17.11 UNILATERAL PRIMARY OSTEOARTHRITIS, RIGHT KNEE: Status: ACTIVE | Noted: 2024-01-09

## 2024-01-18 ENCOUNTER — APPOINTMENT (OUTPATIENT)
Dept: PRIMARY CARE | Facility: CLINIC | Age: 84
End: 2024-01-18
Payer: MEDICARE

## 2024-01-22 ENCOUNTER — OFFICE VISIT (OUTPATIENT)
Dept: CARDIOLOGY | Facility: CLINIC | Age: 84
End: 2024-01-22
Payer: MEDICARE

## 2024-01-22 VITALS
BODY MASS INDEX: 24.31 KG/M2 | DIASTOLIC BLOOD PRESSURE: 62 MMHG | HEART RATE: 102 BPM | WEIGHT: 205 LBS | SYSTOLIC BLOOD PRESSURE: 104 MMHG | OXYGEN SATURATION: 97 %

## 2024-01-22 DIAGNOSIS — I50.22 CHRONIC SYSTOLIC CONGESTIVE HEART FAILURE (MULTI): ICD-10-CM

## 2024-01-22 DIAGNOSIS — E78.00 PURE HYPERCHOLESTEROLEMIA: ICD-10-CM

## 2024-01-22 DIAGNOSIS — I10 BENIGN ESSENTIAL HYPERTENSION: ICD-10-CM

## 2024-01-22 DIAGNOSIS — I48.20 CHRONIC ATRIAL FIBRILLATION (MULTI): Primary | ICD-10-CM

## 2024-01-22 PROCEDURE — 3078F DIAST BP <80 MM HG: CPT | Performed by: INTERNAL MEDICINE

## 2024-01-22 PROCEDURE — 3074F SYST BP LT 130 MM HG: CPT | Performed by: INTERNAL MEDICINE

## 2024-01-22 PROCEDURE — 99213 OFFICE O/P EST LOW 20 MIN: CPT | Performed by: INTERNAL MEDICINE

## 2024-01-22 PROCEDURE — 1159F MED LIST DOCD IN RCRD: CPT | Performed by: INTERNAL MEDICINE

## 2024-01-22 PROCEDURE — 1036F TOBACCO NON-USER: CPT | Performed by: INTERNAL MEDICINE

## 2024-01-22 PROCEDURE — 1125F AMNT PAIN NOTED PAIN PRSNT: CPT | Performed by: INTERNAL MEDICINE

## 2024-01-22 ASSESSMENT — PAIN SCALES - GENERAL: PAINLEVEL: 7

## 2024-01-22 NOTE — PROGRESS NOTES
Subjective      No chief complaint on file.         He is doing alright and is not sleeping after 4-5 hours a night and worried about his wife.  He is not complaining of chest discomfort.  NO PND or orthopnea.  The legs are swelling on him.  He does not complain of palpitations. The swelling in the legs are better.  The TKR is rescheduled due to his wife.  His wt is stable. He is off the steroids now'  Does not feel the heart is racing.           ROS     Past Surgical History:   Procedure Laterality Date    OTHER SURGICAL HISTORY      Hernia repair        Active Ambulatory Problems     Diagnosis Date Noted    Bilateral inguinal hernia without obstruction or gangrene 09/13/2023    BPH associated with nocturia 08/11/2021    DDD (degenerative disc disease), lumbar 09/13/2023    Degenerative joint disease 09/13/2023    Essential tremor 09/13/2023    History of TIA (transient ischemic attack) 09/13/2023    Hyperlipidemia, unspecified 06/20/2019    Onychomycosis 01/22/2019    Osteoarthritis of both knees 03/18/2021    Chronic atrial fibrillation (CMS/[facility]) 09/13/2023    Pure hypercholesterolemia 09/13/2023    Stroke (CMS/[facility]) 09/13/2023    Vitamin D deficiency 09/13/2023    Benign essential hypertension 04/04/2022    Benign prostatic hyperplasia without urinary obstruction 12/01/2023    Cataract 12/01/2023    Chronic arthritis 12/01/2023    Congestive heart failure (CMS/[facility]) 12/01/2023    Disease of spinal cord (CMS/[facility]) 05/31/2023    Disorder of prostate 12/01/2023    Dyslipidemia 12/01/2023    Eczema 12/01/2023    Impaired fasting glucose 06/23/2020    Impairment of balance 12/01/2023    Disorder of rotator cuff 12/01/2023    Knee pain 12/01/2023    Olecranon bursitis of left elbow 07/22/2020    Polymyalgia rheumatica (CMS/HCC) 12/01/2023    Sensorineural hearing loss (SNHL) of both ears 12/01/2023    Unilateral primary osteoarthritis, right knee 01/09/2024     Resolved Ambulatory Problems     Diagnosis Date Noted    No  "Resolved Ambulatory Problems     Past Medical History:   Diagnosis Date    CHF (congestive heart failure) (CMS/HCC)     Hypercholesteremia     Hyperlipidemia     IFG (impaired fasting glucose)     Myelopathy (CMS/HCC)     Osteoarthritis     Other conditions influencing health status     Other conditions influencing health status     Paroxysmal A-fib (CMS/HCC)     Spondylosis without myelopathy or radiculopathy, lumbosacral region     TIA (transient ischemic attack)     Unspecified abdominal hernia without obstruction or gangrene         Visit Vitals  /62   Pulse 102   Wt 93 kg (205 lb)   SpO2 97%   BMI 24.31 kg/m²   Smoking Status Never   BSA 2.25 m²        Objective     Constitutional:       Appearance: Healthy appearance.   Eyes:      Pupils: Pupils are equal, round, and reactive to light.   Neck:      Vascular: JVD normal.   Pulmonary:      Breath sounds: Normal breath sounds.   Cardiovascular:      PMI at left midclavicular line. Normal rate. Irregularly irregular rhythm. Normal S1. Normal S2.       Murmurs: There is no murmur.      No gallop.  No click. No rub.   Pulses:     Intact distal pulses.   Edema:     Ankle: trace edema of the left ankle.  Abdominal:      Palpations: Abdomen is soft.      Tenderness: There is no abdominal tenderness.   Musculoskeletal:      Extremities: No clubbing present.Skin:     General: Skin is warm and dry.   Neurological:      General: No focal deficit present.            Lab Review:         Lab Results   Component Value Date    CHOL 148 12/01/2023    CHOL 158 09/06/2023    CHOL 143 02/16/2023     Lab Results   Component Value Date    HDL 57.0 12/01/2023    HDL 63 09/06/2023    HDL 45 02/16/2023     Lab Results   Component Value Date    LDLCALC 75 12/01/2023    LDLCALC 73 09/06/2023    LDLCALC 82 02/16/2023     Lab Results   Component Value Date    TRIG 80 12/01/2023    TRIG 108 09/06/2023    TRIG 79 02/16/2023     No components found for: \"CHOLHDL\"     Assessment/Plan "     Chronic atrial fibrillation (CMS/HCC)  Is doing well and is stable.  Is on the eliquis    Congestive heart failure (CMS/HCC)   Is much improved and the legs are down.  Does have mild swelling in the right ankle and is improved.  Has been more active.  He needs to have the right knee replaced and feel is low risk for it.    Hyperlipidemia, unspecified  Is stable

## 2024-01-22 NOTE — ASSESSMENT & PLAN NOTE
Is much improved and the legs are down.  Does have mild swelling in the right ankle and is improved.  Has been more active.  He needs to have the right knee replaced and feel is low risk for it.

## 2024-02-02 ENCOUNTER — APPOINTMENT (OUTPATIENT)
Dept: ORTHOPEDIC SURGERY | Facility: CLINIC | Age: 84
End: 2024-02-02
Payer: MEDICARE

## 2024-02-06 ENCOUNTER — OFFICE VISIT (OUTPATIENT)
Dept: PRIMARY CARE | Facility: CLINIC | Age: 84
End: 2024-02-06
Payer: MEDICARE

## 2024-02-06 VITALS
HEIGHT: 77 IN | WEIGHT: 208 LBS | SYSTOLIC BLOOD PRESSURE: 114 MMHG | DIASTOLIC BLOOD PRESSURE: 72 MMHG | BODY MASS INDEX: 24.56 KG/M2

## 2024-02-06 DIAGNOSIS — E78.00 PURE HYPERCHOLESTEROLEMIA: ICD-10-CM

## 2024-02-06 DIAGNOSIS — Z86.73 HISTORY OF TIA (TRANSIENT ISCHEMIC ATTACK): ICD-10-CM

## 2024-02-06 DIAGNOSIS — I48.20 CHRONIC ATRIAL FIBRILLATION (MULTI): ICD-10-CM

## 2024-02-06 DIAGNOSIS — I10 BENIGN ESSENTIAL HYPERTENSION: ICD-10-CM

## 2024-02-06 DIAGNOSIS — M17.0 PRIMARY OSTEOARTHRITIS OF BOTH KNEES: Primary | ICD-10-CM

## 2024-02-06 PROBLEM — M35.3 POLYMYALGIA RHEUMATICA (MULTI): Status: RESOLVED | Noted: 2023-12-01 | Resolved: 2024-02-06

## 2024-02-06 PROBLEM — H61.23 BILATERAL IMPACTED CERUMEN: Status: ACTIVE | Noted: 2024-02-06

## 2024-02-06 PROCEDURE — 1036F TOBACCO NON-USER: CPT | Performed by: INTERNAL MEDICINE

## 2024-02-06 PROCEDURE — 1157F ADVNC CARE PLAN IN RCRD: CPT | Performed by: INTERNAL MEDICINE

## 2024-02-06 PROCEDURE — 3078F DIAST BP <80 MM HG: CPT | Performed by: INTERNAL MEDICINE

## 2024-02-06 PROCEDURE — 1160F RVW MEDS BY RX/DR IN RCRD: CPT | Performed by: INTERNAL MEDICINE

## 2024-02-06 PROCEDURE — 3074F SYST BP LT 130 MM HG: CPT | Performed by: INTERNAL MEDICINE

## 2024-02-06 PROCEDURE — 99213 OFFICE O/P EST LOW 20 MIN: CPT | Performed by: INTERNAL MEDICINE

## 2024-02-06 PROCEDURE — 1159F MED LIST DOCD IN RCRD: CPT | Performed by: INTERNAL MEDICINE

## 2024-02-06 PROCEDURE — 1125F AMNT PAIN NOTED PAIN PRSNT: CPT | Performed by: INTERNAL MEDICINE

## 2024-02-06 RX ORDER — TAMSULOSIN HYDROCHLORIDE 0.4 MG/1
CAPSULE ORAL
COMMUNITY
Start: 2023-12-09 | End: 2024-02-06 | Stop reason: ALTCHOICE

## 2024-02-06 RX ORDER — ACETAMINOPHEN 500 MG
TABLET ORAL EVERY 6 HOURS PRN
COMMUNITY
End: 2024-04-05 | Stop reason: HOSPADM

## 2024-02-06 ASSESSMENT — PAIN SCALES - GENERAL: PAINLEVEL: 4

## 2024-02-06 ASSESSMENT — ENCOUNTER SYMPTOMS
LOSS OF SENSATION IN FEET: 0
DEPRESSION: 0
SHORTNESS OF BREATH: 0
PALPITATIONS: 0
OCCASIONAL FEELINGS OF UNSTEADINESS: 0

## 2024-02-06 ASSESSMENT — PATIENT HEALTH QUESTIONNAIRE - PHQ9
1. LITTLE INTEREST OR PLEASURE IN DOING THINGS: NOT AT ALL
SUM OF ALL RESPONSES TO PHQ9 QUESTIONS 1 AND 2: 0
2. FEELING DOWN, DEPRESSED OR HOPELESS: NOT AT ALL

## 2024-02-06 NOTE — PROGRESS NOTES
[facility]: [facility]  PROGRESS NOTE      Qamar Mckinney is a 83 y.o. male that is presenting today for Follow-up.    Assessment/Plan   Diagnoses and all orders for this visit:  Pure hypercholesterolemia  Comments:  LDL 75  doing well.  Chronic atrial fibrillation (CMS/HCC)  Comments:   to clear.  Benign essential hypertension  Comments:  BP doing OK.  Primary osteoarthritis of both knees  Comments:  need knee repalcements Bilaterally. Right then left? Dr.William Silva, Mike Garsia are both terrific.  History of TIA (transient ischemic attack)    Subjective   Knees doing poorly, needs TKA, PAF, unable to tolerate ACE /ARB low BP stopped euvolemci. PMR resolved.    Review of Systems   Respiratory:  Negative for shortness of breath.    Cardiovascular:  Negative for chest pain and palpitations.   All other systems reviewed and are negative.     Objective   Vitals:    02/06/24 1328   BP: 114/72      Body mass index is 24.67 kg/m².  Physical Exam  Constitutional:       General: He is not in acute distress.  HENT:      Head: Normocephalic and atraumatic.      Right Ear: Tympanic membrane normal.      Left Ear: Tympanic membrane normal.      Mouth/Throat:      Mouth: Mucous membranes are moist.      Pharynx: Oropharynx is clear.   Eyes:      Extraocular Movements: Extraocular movements intact.      Conjunctiva/sclera: Conjunctivae normal.      Pupils: Pupils are equal, round, and reactive to light.   Cardiovascular:      Rate and Rhythm: Normal rate and regular rhythm.   Pulmonary:      Breath sounds: Normal breath sounds.   Abdominal:      General: Bowel sounds are normal.      Palpations: Abdomen is soft. There is no mass.   Musculoskeletal:         General: Tenderness (Marked knee DJD) present. Normal range of motion.      Cervical back: Neck supple. No tenderness.      Right lower leg: Edema (trace) present.      Left lower leg: Edema (trace) present.   Skin:     General: Skin is warm and  "dry.   Neurological:      General: No focal deficit present.      Mental Status: He is oriented to person, place, and time.     Diagnostic Results   Lab Results   Component Value Date    GLUCOSE 105 (H) 12/01/2023    CALCIUM 9.3 12/01/2023     12/01/2023    K 4.5 12/01/2023    CO2 27 12/01/2023     12/01/2023    BUN 16 12/01/2023    CREATININE 1.00 12/01/2023     Lab Results   Component Value Date    ALT 13 12/01/2023    AST 16 12/01/2023    ALKPHOS 82 12/01/2023    BILITOT 1.0 12/01/2023     Lab Results   Component Value Date    WBC 8.7 12/01/2023    HGB 13.7 12/01/2023    HCT 43.3 12/01/2023    MCV 94 12/01/2023     12/01/2023     Lab Results   Component Value Date    CHOL 148 12/01/2023    CHOL 158 09/06/2023    CHOL 143 02/16/2023     Lab Results   Component Value Date    HDL 57.0 12/01/2023    HDL 63 09/06/2023    HDL 45 02/16/2023     Lab Results   Component Value Date    LDLCALC 75 12/01/2023    LDLCALC 73 09/06/2023    LDLCALC 82 02/16/2023     Lab Results   Component Value Date    TRIG 80 12/01/2023    TRIG 108 09/06/2023    TRIG 79 02/16/2023     No components found for: \"CHOLHDL\"  Lab Results   Component Value Date    HGBA1C 5.6 12/01/2023     Other labs not included in the list above were reviewed either before or during this encounter.    History    Past Medical History:   Diagnosis Date   • Benign essential hypertension 04/04/2022   • Benign prostatic hyperplasia without urinary obstruction 12/01/2023   • CHF (congestive heart failure) (CMS/HCC)    • Chronic atrial fibrillation (CMS/HCC) 09/13/2023   • Congestive heart failure (CMS/HCC) 12/01/2023    EF 55% 4/22 borderline dilation.   • DDD (degenerative disc disease), lumbar    • Degenerative joint disease 09/13/2023   • Eczema    • Essential tremor    • History of TIA (transient ischemic attack) 09/13/2023   • Hypercholesteremia    • Hyperlipidemia    • IFG (impaired fasting glucose)    • Myelopathy (CMS/[facility])    • Onychomycosis  "   • Osteoarthritis    • Osteoarthritis of both knees 03/18/2021   • Other conditions influencing health status     Post Varicella   • Other conditions influencing health status     Nephrolithiasis   • Paroxysmal A-fib (CMS/[facility])    • Polymyalgia rheumatica (CMS/formerly [facility])    • Pure hypercholesterolemia 09/13/2023   • Spondylosis without myelopathy or radiculopathy, lumbosacral region    • TIA (transient ischemic attack)    • Unspecified abdominal hernia without obstruction or gangrene     Hernia     Past Surgical History:   Procedure Laterality Date   • OTHER SURGICAL HISTORY      Hernia repair     Family History   Problem Relation Name Age of Onset   • No Known Problems Mother     • Colon cancer Father       Social History     Socioeconomic History   • Marital status:      Spouse name: Not on file   • Number of children: Not on file   • Years of education: Not on file   • Highest education level: Not on file   Occupational History   • Not on file   Tobacco Use   • Smoking status: Never     Passive exposure: Never   • Smokeless tobacco: Never   Vaping Use   • Vaping Use: Never used   Substance and Sexual Activity   • Alcohol use: Yes     Comment: wine   • Drug use: Never   • Sexual activity: Not on file   Other Topics Concern   • Not on file   Social History Narrative   • Not on file     Social Determinants of Health     Financial Resource Strain: Not on file   Food Insecurity: Not on file   Transportation Needs: Not on file   Physical Activity: Not on file   Stress: Not on file   Social Connections: Not on file   Intimate Partner Violence: Not on file   Housing Stability: Not on file     Allergies   Allergen Reactions   • Furosemide Other     Lightheadedness, BP Drop     Current Outpatient Medications on File Prior to Visit   Medication Sig Dispense Refill   • acetaminophen (Tylenol Extra Strength) 500 mg tablet Take by mouth every 6 hours if needed for mild pain (1 - 3).     • atorvastatin (Lipitor) 10 mg tablet  Take 1 tablet (10 mg) by mouth once daily.     • cholecalciferol (Vitamin D-3) 25 MCG (1000 UT) capsule Take by mouth. TAKE AS DIRECTED     • Eliquis 5 mg tablet TAKE 1 TABLET TWICE A DAY AS DIRECTED 180 tablet 3   • ketoconazole (NIZOral) 2 % cream Apply 1 Application topically 2 times a day. As needed     • metoprolol tartrate (Lopressor) 25 mg tablet Take 1 tablet (25 mg) by mouth 2 times a day.     • vitamins A,C,E-zinc-copper (ICaps AREDS) 4,296 mcg-226 mg-90 mg capsule Take by mouth. TAKE AS DIRECTED     • [DISCONTINUED] tamsulosin (Flomax) 0.4 mg 24 hr capsule        No current facility-administered medications on file prior to visit.     Immunization History   Administered Date(s) Administered   • Flu vaccine, quadrivalent, high-dose, preservative free, age 65y+ (FLUZONE) 10/26/2020, 11/20/2023   • Influenza, High Dose Seasonal, Preservative Free 10/24/2018   • Influenza, Seasonal, Quadrivalent, Adjuvanted 10/25/2021   • Influenza, Unspecified 09/28/2009, 10/24/2018   • Influenza, injectable, quadrivalent 09/26/2019   • Influenza, seasonal, injectable 09/08/2015, 09/21/2015   • Moderna COVID-19 vaccine, bivalent, blue cap/gray label *Check age/dose* 09/28/2022   • Pfizer COVID-19 vaccine, Fall 2023, 12 years and older, (30mcg/0.3mL) 12/30/2023   • Pfizer Purple Cap SARS-CoV-2 01/28/2021, 02/25/2021, 10/04/2021   • Pneumococcal polysaccharide vaccine, 23-valent, age 2 years and older (PNEUMOVAX 23) 09/02/2013, 08/08/2014, 06/19/2018   • RESPIRATORY SYNCYTIAL VIRUS (RSV), ELIGIBLE PREGNANT PTS, 0.5 ML (ABRYSVO) 10/24/2023   • Zoster vaccine, recombinant, adult (SHINGRIX) 12/11/2018, 02/18/2019   • Zoster, live 05/13/2022     Patient's medical history was reviewed and updated either before or during this encounter.       Waqas Fernandez MD

## 2024-02-06 NOTE — PATIENT INSTRUCTIONS
Diagnoses and all orders for this visit:  Primary osteoarthritis of both knees  Comments:  need knee repalcements Bilaterally. Right then left? Dr.William Silva, Mike Garsia are both terrific.  Pure hypercholesterolemia  Comments:  LDL 75  doing well.  Chronic atrial fibrillation (CMS/HCC)  Comments:   to clear.  Benign essential hypertension  Comments:  BP doing OK.  History of TIA (transient ischemic attack)

## 2024-02-18 ENCOUNTER — ANESTHESIA EVENT (OUTPATIENT)
Dept: OPERATING ROOM | Facility: HOSPITAL | Age: 84
DRG: 470 | End: 2024-02-18
Payer: MEDICARE

## 2024-03-14 ENCOUNTER — APPOINTMENT (OUTPATIENT)
Dept: ORTHOPEDIC SURGERY | Facility: CLINIC | Age: 84
End: 2024-03-14
Payer: MEDICARE

## 2024-03-18 ENCOUNTER — APPOINTMENT (OUTPATIENT)
Dept: PRIMARY CARE | Facility: CLINIC | Age: 84
End: 2024-03-18
Payer: MEDICARE

## 2024-03-19 ENCOUNTER — PRE-ADMISSION TESTING (OUTPATIENT)
Dept: PREADMISSION TESTING | Facility: HOSPITAL | Age: 84
End: 2024-03-19
Payer: MEDICARE

## 2024-03-19 VITALS
RESPIRATION RATE: 16 BRPM | SYSTOLIC BLOOD PRESSURE: 102 MMHG | OXYGEN SATURATION: 95 % | TEMPERATURE: 96.6 F | HEIGHT: 77 IN | DIASTOLIC BLOOD PRESSURE: 77 MMHG | BODY MASS INDEX: 23.62 KG/M2 | HEART RATE: 98 BPM | WEIGHT: 200 LBS

## 2024-03-19 DIAGNOSIS — Z01.818 PREOPERATIVE TESTING: Primary | ICD-10-CM

## 2024-03-19 LAB
ANION GAP SERPL CALC-SCNC: 10 MMOL/L
APPEARANCE UR: CLEAR
BILIRUB UR STRIP.AUTO-MCNC: NEGATIVE MG/DL
BUN SERPL-MCNC: 19 MG/DL (ref 8–25)
CALCIUM SERPL-MCNC: 9.4 MG/DL (ref 8.5–10.4)
CHLORIDE SERPL-SCNC: 101 MMOL/L (ref 97–107)
CO2 SERPL-SCNC: 28 MMOL/L (ref 24–31)
COLOR UR: YELLOW
CREAT SERPL-MCNC: 1 MG/DL (ref 0.4–1.6)
EGFRCR SERPLBLD CKD-EPI 2021: 75 ML/MIN/1.73M*2
ERYTHROCYTE [DISTWIDTH] IN BLOOD BY AUTOMATED COUNT: 12.7 % (ref 11.5–14.5)
GLUCOSE SERPL-MCNC: 105 MG/DL (ref 65–99)
GLUCOSE UR STRIP.AUTO-MCNC: NORMAL MG/DL
HCT VFR BLD AUTO: 41.3 % (ref 41–52)
HGB BLD-MCNC: 13.6 G/DL (ref 13.5–17.5)
HYALINE CASTS #/AREA URNS AUTO: ABNORMAL /LPF
KETONES UR STRIP.AUTO-MCNC: NEGATIVE MG/DL
LEUKOCYTE ESTERASE UR QL STRIP.AUTO: NEGATIVE
MCH RBC QN AUTO: 30 PG (ref 26–34)
MCHC RBC AUTO-ENTMCNC: 32.9 G/DL (ref 32–36)
MCV RBC AUTO: 91 FL (ref 80–100)
MUCOUS THREADS #/AREA URNS AUTO: ABNORMAL /LPF
NITRITE UR QL STRIP.AUTO: NEGATIVE
NRBC BLD-RTO: NORMAL /100{WBCS}
PH UR STRIP.AUTO: 5 [PH]
PLATELET # BLD AUTO: 250 X10*3/UL (ref 150–450)
POTASSIUM SERPL-SCNC: 4.3 MMOL/L (ref 3.4–5.1)
PROT UR STRIP.AUTO-MCNC: NORMAL MG/DL
RBC # BLD AUTO: 4.54 X10*6/UL (ref 4.5–5.9)
RBC # UR STRIP.AUTO: NEGATIVE /UL
RBC #/AREA URNS AUTO: ABNORMAL /HPF
SODIUM SERPL-SCNC: 139 MMOL/L (ref 133–145)
SP GR UR STRIP.AUTO: 1.03
UROBILINOGEN UR STRIP.AUTO-MCNC: NORMAL MG/DL
WBC # BLD AUTO: 8.1 X10*3/UL (ref 4.4–11.3)
WBC #/AREA URNS AUTO: ABNORMAL /HPF

## 2024-03-19 PROCEDURE — 36415 COLL VENOUS BLD VENIPUNCTURE: CPT

## 2024-03-19 PROCEDURE — 99204 OFFICE O/P NEW MOD 45 MIN: CPT | Performed by: NURSE PRACTITIONER

## 2024-03-19 PROCEDURE — 87081 CULTURE SCREEN ONLY: CPT | Mod: TRILAB,WESLAB

## 2024-03-19 PROCEDURE — 81001 URINALYSIS AUTO W/SCOPE: CPT

## 2024-03-19 PROCEDURE — 93010 ELECTROCARDIOGRAM REPORT: CPT | Performed by: INTERNAL MEDICINE

## 2024-03-19 PROCEDURE — 93005 ELECTROCARDIOGRAM TRACING: CPT

## 2024-03-19 PROCEDURE — 80048 BASIC METABOLIC PNL TOTAL CA: CPT

## 2024-03-19 PROCEDURE — 85027 COMPLETE CBC AUTOMATED: CPT

## 2024-03-19 RX ORDER — CHLORHEXIDINE GLUCONATE ORAL RINSE 1.2 MG/ML
SOLUTION DENTAL
Qty: 473 ML | Refills: 0 | Status: SHIPPED | OUTPATIENT
Start: 2024-03-19 | End: 2024-03-20 | Stop reason: ALTCHOICE

## 2024-03-19 ASSESSMENT — DUKE ACTIVITY SCORE INDEX (DASI)
CAN YOU CLIMB A FLIGHT OF STAIRS OR WALK UP A HILL: YES
CAN YOU HAVE SEXUAL RELATIONS: YES
CAN YOU WALK A BLOCK OR TWO ON LEVEL GROUND: YES
CAN YOU DO YARD WORK LIKE RAKING LEAVES, WEEDING OR PUSHING A MOWER: NO
CAN YOU TAKE CARE OF YOURSELF (EAT, DRESS, BATHE, OR USE TOILET): YES
CAN YOU PARTICIPATE IN STRENOUS SPORTS LIKE SWIMMING, SINGLES TENNIS, FOOTBALL, BASKETBALL, OR SKIING: NO
CAN YOU PARTICIPATE IN MODERATE RECREATIONAL ACTIVITIES LIKE GOLF, BOWLING, DANCING, DOUBLES TENNIS OR THROWING A BASEBALL OR FOOTBALL: NO
CAN YOU WALK INDOORS, SUCH AS AROUND YOUR HOUSE: YES
CAN YOU DO HEAVY WORK AROUND THE HOUSE LIKE SCRUBBING FLOORS OR LIFTING AND MOVING HEAVY FURNITURE: YES
CAN YOU DO MODERATE WORK AROUND THE HOUSE LIKE VACUUMING, SWEEPING FLOORS OR CARRYING GROCERIES: YES
CAN YOU RUN A SHORT DISTANCE: NO

## 2024-03-19 ASSESSMENT — CHADS2 SCORE
CHF: YES
PRIOR STROKE OR TIA OR THROMBOEMBOLISM: YES
DIABETES: NO
HYPERTENSION: YES
AGE GREATER THAN OR EQUAL TO 75: YES
CHADS2 SCORE: 5

## 2024-03-19 ASSESSMENT — PAIN DESCRIPTION - DESCRIPTORS: DESCRIPTORS: DULL;SHARP

## 2024-03-19 ASSESSMENT — ENCOUNTER SYMPTOMS
EYES NEGATIVE: 1
ARTHRALGIAS: 1
ACTIVITY CHANGE: 1
CARDIOVASCULAR NEGATIVE: 1
RESPIRATORY NEGATIVE: 1
PSYCHIATRIC NEGATIVE: 1
GASTROINTESTINAL NEGATIVE: 1

## 2024-03-19 ASSESSMENT — PAIN SCALES - GENERAL: PAINLEVEL_OUTOF10: 7

## 2024-03-19 ASSESSMENT — PAIN - FUNCTIONAL ASSESSMENT: PAIN_FUNCTIONAL_ASSESSMENT: 0-10

## 2024-03-19 NOTE — NURSING NOTE
Pt seen in PAT. On Eliquis. States has a clearance appointment with Dr. Castaneda tomorrow. Patient instructed to follow instructions regarding when to stop Eliquis from Dr. Castaneda tomorrow.

## 2024-03-19 NOTE — CPM/PAT H&P
"CPM/PAT Evaluation       Name: Qamar JIN Mckinney (Qamar JIN Mckinney \"Toni\")  /Age: 1940/83 y.o.     In-Person       Chief Complaint: RIGHT KNEE OSTEOARTHRITIS     HPI  An 83-year-old male with right knee osteoarthritis.  History of progressive right knee pain over the past 10 years that has become more severe over the past 2 years. Symptoms increased with prolonged standing, ambulation, stairs, transitioning from sitting to standing interfering with ADLs.  Conservative treatments/injections not helping.  Denies fever, chills, chest pain, shortness of breath, syncope, or right lower extremity numbness.  He is scheduled for right total knee open arthroplasty.    Past Medical History:   Diagnosis Date    Benign essential hypertension 2022    Benign prostatic hyperplasia without urinary obstruction 2023    Bilateral inguinal hernia without obstruction or gangrene 2023    CHF (congestive heart failure) (CMS/[facility])     Chronic atrial fibrillation (CMS/[facility]) 2023    Congestive heart failure (CMS/[facility]) 2023    EF 55%  borderline dilation.    DDD (degenerative disc disease), lumbar     Degenerative joint disease 2023    Eczema     Essential tremor     History of TIA (transient ischemic attack) 2023    Hypercholesteremia     Hyperlipidemia     IFG (impaired fasting glucose)     Myelopathy (CMS/[facility])     Onychomycosis     Osteoarthritis     Osteoarthritis of both knees 2021    Other conditions influencing health status     Post Varicella    Other conditions influencing health status     Nephrolithiasis    Paroxysmal A-fib (CMS/[facility])     Polymyalgia rheumatica (CMS/[facility])     Pure hypercholesterolemia 2023    Spondylosis without myelopathy or radiculopathy, lumbosacral region     TIA (transient ischemic attack)     Unspecified abdominal hernia without obstruction or gangrene     Hernia       Past Surgical History:   Procedure Laterality Date    CATARACT EXTRACTION Left     OTHER " SURGICAL HISTORY      Hernia repair         Allergies   Allergen Reactions    Furosemide Other     Lightheadedness, BP Drop       Current Outpatient Medications   Medication Sig Dispense Refill    acetaminophen (Tylenol Extra Strength) 500 mg tablet Take by mouth every 6 hours if needed for mild pain (1 - 3).      atorvastatin (Lipitor) 10 mg tablet Take 1 tablet (10 mg) by mouth once daily.      chlorhexidine (Peridex) 0.12 % solution Use cap to measure 15 mL.  Swish/gargle mouthwash for at least 30 seconds.  Do not swallow.  Use night before surgery after brushing teeth and morning of surgery after brushing teeth. 473 mL 0    cholecalciferol (Vitamin D-3) 25 MCG (1000 UT) capsule Take by mouth. TAKE AS DIRECTED      Eliquis 5 mg tablet TAKE 1 TABLET TWICE A DAY AS DIRECTED 180 tablet 3    ketoconazole (NIZOral) 2 % cream Apply 1 Application topically 2 times a day. As needed      metoprolol tartrate (Lopressor) 25 mg tablet Take 1 tablet (25 mg) by mouth 2 times a day.      vitamins A,C,E-zinc-copper (ICaps AREDS) 4,296 mcg-226 mg-90 mg capsule Take by mouth. TAKE AS DIRECTED       No current facility-administered medications for this visit.          Review of Systems   Constitutional:  Positive for activity change.   HENT: Negative.     Eyes: Negative.         Glasses   Respiratory: Negative.     Cardiovascular: Negative.         H/o a-fib   Gastrointestinal: Negative.    Genitourinary: Negative.    Musculoskeletal:  Positive for arthralgias and gait problem.        Right knee pain   Skin: Negative.    Psychiatric/Behavioral: Negative.          Physical Exam  Vitals reviewed.   HENT:      Head: Normocephalic and atraumatic.      Mouth/Throat:      Mouth: Mucous membranes are moist.   Eyes:      Pupils: Pupils are equal, round, and reactive to light.      Comments: glasses   Cardiovascular:      Rate and Rhythm: Normal rate. Rhythm irregular.      Comments: h/o a-fib  Pulmonary:      Effort: Pulmonary effort is  "normal.      Breath sounds: Normal breath sounds.   Abdominal:      Palpations: Abdomen is soft.   Musculoskeletal:      Cervical back: Normal range of motion.      Comments: Right knee pain, antalgic gait   Skin:     General: Skin is warm and dry.   Neurological:      Mental Status: He is alert and oriented to person, place, and time.   Psychiatric:         Mood and Affect: Mood normal.          PAT AIRWAY:   Airway:     Mallampati::  II    Neck ROM::  Full  normal        /77   Pulse 98   Temp 35.9 °C (96.6 °F) (Temporal)   Resp 16   Ht 1.956 m (6' 5\")   Wt 90.7 kg (200 lb)   SpO2 95%   BMI 23.72 kg/m²         Stop Bang Score 4     CHADS 2 score: 12.5%  DASI score:  METS score:  Revised cardiac risk index: >11%  ASA III  ARISCAT 1.6%  PAT orders CBC, BMP, UA, MRSA, EKG  EKG done at [facility]-atrial fibrillation with premature ventricular or aberrantly conducted complexes, nonspecific intraventricular conduction delay preliminary awaiting confirmation by cardiologist  Assessment and Plan:     RIGHT KNEE OSTEOARTHRITIS Plan: Right total knee open arthroplasty.  Atrial fibrillation managed with metoprolol and Eliquis follows with Dr. Castaneda  History of CHF echocardiogram done 9/6/2023-left ventricle systolic function is mildly decreased, with ejection fraction of 45 to 50%-Patient has clearance appointment with cardiologist 3/20/2023.  History of TIA-denies residual or recurrence  Hypertension  Hyperlipidemia  Macular degeneration  Essential tremor  BPH  DJD  Osteoarthritis  BMI 24.67    ADDENDUM:  Cardiac clearance given by Dr. Castaneda 3/20/2024     "

## 2024-03-19 NOTE — PREPROCEDURE INSTRUCTIONS
Preoperative Fasting Guidelines    Why must I stop eating and drinking near surgery time?  With sedation, food or liquid in your stomach can enter your lungs causing serious complications  Increases nausea and vomiting    When do I need to stop eating and drinking before my surgery?  Do not eat any food after midnight the night before your surgery/procedure.  You may have up to TEN ounces of clear liquid until TWO hours before your instructed arrival time to the hospital.  This includes water, black tea/coffee, (no milk or cream) apple juice, and electrolyte drinks (Gatorade)  You may chew gum until TWO hours before your surgery/procedure    PAT DISCHARGE INSTRUCTIONS    Please call the Same Day Surgery (SDS) Department of the hospital where your procedure will be performed after 2:00 PM the day before your surgery. If you are scheduled on a Monday, or a Tuesday following a Monday holiday, you will need to call on the last business day prior to your surgery.    [place] [id number] [phone]    Please let your surgeon know if:      You develop any open sores, shingles, burning or painful urination as these may increase your risk of an infection.   You no longer wish to have the surgery.   Any other personal circumstances change that may lead to the need to cancel or defer this surgery-such as being sick or getting admitted to any hospital within one week of your planned procedure.    Your contact details change, such as a change of address or phone number.    Starting now:     Please DO NOT drink alcohol or smoke for 24 hours before surgery. It is well known that quitting smoking can make a huge difference to your health and recovery from surgery. The longer you abstain from smoking, the better your chances of a healthy recovery. If you need help with quitting, call 2-800-QUIT-NOW to be connected to a trained counselor who will discuss the best  methods to help you quit.     Before your surgery:    Please stop all supplements 7 days prior to surgery. Or as directed by your surgeon.   Please stop taking NSAID pain medicine such as Advil and Motrin 7 days before surgery.    If you develop any fever, cough, cold, rashes, cuts, scratches, scrapes, urinary symptoms or infection anywhere on your body (including teeth and gums) prior to surgery, please call your surgeon’s office as soon as possible. This may require treatment to reduce the chance of cancellation on the day of surgery.    The day before your surgery:   DIET- Please follow the diet instructions at the top of your packet.   Get a good night’s rest.  Use the special soap for bathing if you have been instructed to use one.    Scheduled surgery times may change and you will be notified if this occurs - please check your personal voicemail for any updates.     On the morning of surgery:   Wear comfortable, loose fitting clothes which open in the front. Please do not wear moisturizers, creams, lotions, makeup or perfume.    Please bring with you to surgery:   Photo ID and insurance card   Current list of medicines and allergies   Pacemaker/ Defibrillator/Heart stent cards   CPAP machine and mask    Slings/ splints/ crutches   A copy of your complete advanced directive/DHPOA.    Please do NOT bring with you to surgery:   All jewelry and valuables should be left at home.   Prosthetic devices such as contact lenses, hearing aids, dentures, eyelash extensions, hairpins and body piercings must be removed prior to going in to the surgical suite.    After outpatient surgery:   A responsible adult MUST accompany you at the time of discharge and stay with you for 24 hours after your surgery. You may NOT drive yourself home after surgery.    Do not drive, operate machinery, make critical decisions or do activities that require co-ordination or balance until after a night’s sleep.   Do not drink alcoholic beverages  for 24 hours.   Instructions for resuming your medications will be provided by your surgeon.    CALL YOUR DOCTOR AFTER SURGERY IF YOU HAVE:     Chills and/or a fever of 101° F or higher.    Redness, swelling, pus or drainage from your surgical wound or a bad smell from the wound.    Lightheadedness, fainting or confusion.    Persistent vomiting (throwing up) and are not able to eat or drink for 12 hours.    Three or more loose, watery bowel movements in 24 hours (diarrhea).   Difficulty or pain while urinating( after non-urological surgery)    Pain and swelling in your legs, especially if it is only on one side.    Difficulty breathing or are breathing faster than normal.    Any new concerning symptoms.      Patient Information: Pre-Operative Infection Prevention Measures     Why did I have my nose, under my arms, and groin swabbed?  The purpose of the swab is to identify Staphylococcus aureus inside your nose or on your skin.  The swab was sent to the laboratory for culture.  A positive swab/culture for Staphylococcus aureus is called colonization or carriage.      What is Staphylococcus aureus?  Staphylococcus aureus, also known as “staph”, is a germ found on the skin or in the nose of healthy people.  Sometimes Staphylococcus aureus can get into the body and cause an infection.  This can be minor (such as pimples, boils, or other skin problems).  It might also be serious (such as a blood infection, pneumonia, or a surgical site infection).    What is Staphylococcus aureus colonization or carriage?  Colonization or carriage means that a person has the germ but is not sick from it.  These bacteria can be spread on the hands or when breathing or sneezing.    How is Staphylococcus aureus spread?  It is most often spread by close contact with a person or item that carries it.    What happens if my culture is positive for Staphylococcus aureus?  Your doctor/medical team will use this information to guide any antibiotic  treatment which may be necessary.  Regardless of the culture results, we will clean the inside of your nose with a betadine swab just before you have your surgery.      Will I get an infection if I have Staphylococcus aureus in my nose or on my skin?  Anyone can get an infection with Staphylococcus aureus.  However, the best way to reduce your risk of infection is to follow the instructions provided to you for the use of your CHG soap and dental rinse.        Patient Information: Oral/Dental Rinse    What is oral/dental rinse?   It is a mouthwash. It is a way of cleaning the mouth with a germ-killing solution before your surgery.  The solution contains chlorhexidine, commonly known as CHG.   It is used inside the mouth to kill a bacteria known as Staphylococcus aureus.  Let your doctor know if you are allergic to Chlorhexidine.    Why do I need to use CHG oral/dental rinse?  The CHG oral/dental rinse helps to kill a bacteria in your mouth known as Staphylococcus aureus.     This reduces the risk of infection at the surgical site.      Using your CHG oral/dental rinse  STEPS:  Use your CHG oral/dental rinse after you brush your teeth the night before (at bedtime) and the morning of your surgery.  Follow all directions on your prescription label.    Use the cap on the container to measure 15ml   Swish (gargle if you can) the mouthwash in your mouth for at least 30 seconds, (do not swallow) and spit out  After you use your CHG rinse, do not rinse your mouth with water, drink or eat.  Please refer to the prescription label for the appropriate time to resume oral intake      What side effects might I have using the CHG oral/dental rinse?  CHG rinse will stick to plaque on the teeth.  Brush and floss just before use.  Teeth brushing will help avoid staining of plaque during use.      Patient Information: Home Preoperative Antibacterial Shower      What is a home preoperative antibacterial shower?  This shower is a way of  cleaning the skin with a germ-killing solution before surgery.  The solution contains chlorhexidine, commonly known as CHG.  CHG is a skin cleanser with germ-killing ability.  Let your doctor know if you are allergic to chlorhexidine.    Why do I need to take a preoperative antibacterial shower?  Skin is not sterile.  It is best to try to make your skin as free of germs as possible before surgery.  Proper cleansing with a germ-killing soap before surgery can lower the number of germs on your skin.  This helps to reduce the risk of infection at the surgical site.  Following the instructions listed below will help you prepare your skin for surgery.      How do I use the solution?  Steps:  Begin using your CHG soap 5 days before your scheduled surgery on ________________________.    First, wash and rinse your hair using the CHG soap. Keep CHG soap away from ear canals and eyes.  Rinse completely, do not condition.  Hair extensions should be removed.  Wash your face with your normal soap and rinse.    Apply the CHG solution to a clean wet washcloth.  Turn the water off or move away from the water spray to avoid premature rinsing of the CHG soap as you are applying.   Firmly lather your entire body from the neck down.  Do not use on your face.  Pay special attention to the area(s) where your incision(s) will be located unless they are on your face.  Avoid scrubbing your skin too hard.  The important point is to have the CHG soap sit on your skin for 3 minutes.    When the 3 minutes are up, turn on the water and rinse the CHG solution off your body completely.   DO NOT wash with regular soap after you have used the CHG soap solution  Pat yourself dry with a clean, freshly-laundered towel.  DO NOT apply powders, deodorants, or lotions.  Dress in clean, freshly laundered nightclothes.    Be sure to sleep with clean, freshly laundered sheets.  Be aware that CHG will cause stains on fabrics; if you wash them with bleach after  use.  Rinse your washcloth and other linens that have contact with CHG completely.  Use only non-chlorine detergents to launder the items used.   The morning of surgery is the fifth day.  Repeat the above steps and dress in clean comfortable clothing     Whom should I contact if I have any questions regarding the use of CHG soap?  Call the [facility] at [phone] if you have any questions.               Preoperative Brain Exercises    What are brain exercises?  A brain exercise is any activity that engages your thinking (cognitive) skills.    What types of activities are considered brain exercises?  Jigsaw puzzles, crossword puzzles, word jumble, memory games, word search, and many more.  Many can be found free online or on your phone via a mobile raghav.    Why should I do brain exercises before my surgery?  More recent research has shown brain exercise before surgery can lower the risk of postoperative delirium (confusion) which can be especially important for older adults.  Patients who did brain exercises for 5 to 10 hours the days before surgery, cut their risk of postoperative delirium in half up to 1 week after surgery.     Medication List            Accurate as of March 19, 2024  1:24 PM. Always use your most recent med list.                atorvastatin 10 mg tablet  Commonly known as: Lipitor  Medication Adjustments for Surgery: Take morning of surgery with sip of water, no other fluids     cholecalciferol 25 MCG (1000 UT) capsule  Commonly known as: Vitamin D-3  Medication Adjustments for Surgery: Stop 7 days before surgery     Eliquis 5 mg tablet  Generic drug: apixaban  TAKE 1 TABLET TWICE A DAY AS DIRECTED  Notes to patient: FOLLOW INSTRUCTIONS FROM DR SMITH AT  3/20 VISIT.     ICaps AREDS 4,296 mcg-226 mg-90 mg capsule  Generic drug: vitamins A,C,E-zinc-copper  Medication Adjustments for Surgery: Stop 7 days before surgery      ketoconazole 2 % cream  Commonly known as: NIZOral  Notes to patient: NOT TAKING     metoprolol tartrate 25 mg tablet  Commonly known as: Lopressor  Medication Adjustments for Surgery: Take morning of surgery with sip of water, no other fluids     Tylenol Extra Strength 500 mg tablet  Generic drug: acetaminophen  Notes to patient: Take as needed.

## 2024-03-19 NOTE — H&P (VIEW-ONLY)
"CPM/PAT Evaluation       Name: Qamar JIN Mckinney (Qamar JIN Mckinney \"Toni\")  /Age: 1940/83 y.o.     In-Person       Chief Complaint: RIGHT KNEE OSTEOARTHRITIS     HPI  An 83-year-old male with right knee osteoarthritis.  History of progressive right knee pain over the past 10 years that has become more severe over the past 2 years. Symptoms increased with prolonged standing, ambulation, stairs, transitioning from sitting to standing interfering with ADLs.  Conservative treatments/injections not helping.  Denies fever, chills, chest pain, shortness of breath, syncope, or right lower extremity numbness.  He is scheduled for right total knee open arthroplasty.    Past Medical History:   Diagnosis Date    Benign essential hypertension 2022    Benign prostatic hyperplasia without urinary obstruction 2023    Bilateral inguinal hernia without obstruction or gangrene 2023    CHF (congestive heart failure) (CMS/[facility])     Chronic atrial fibrillation (CMS/[facility]) 2023    Congestive heart failure (CMS/[facility]) 2023    EF 55%  borderline dilation.    DDD (degenerative disc disease), lumbar     Degenerative joint disease 2023    Eczema     Essential tremor     History of TIA (transient ischemic attack) 2023    Hypercholesteremia     Hyperlipidemia     IFG (impaired fasting glucose)     Myelopathy (CMS/[facility])     Onychomycosis     Osteoarthritis     Osteoarthritis of both knees 2021    Other conditions influencing health status     Post Varicella    Other conditions influencing health status     Nephrolithiasis    Paroxysmal A-fib (CMS/[facility])     Polymyalgia rheumatica (CMS/[facility])     Pure hypercholesterolemia 2023    Spondylosis without myelopathy or radiculopathy, lumbosacral region     TIA (transient ischemic attack)     Unspecified abdominal hernia without obstruction or gangrene     Hernia       Past Surgical History:   Procedure Laterality Date    CATARACT EXTRACTION Left     OTHER " SURGICAL HISTORY      Hernia repair         Allergies   Allergen Reactions    Furosemide Other     Lightheadedness, BP Drop       Current Outpatient Medications   Medication Sig Dispense Refill    acetaminophen (Tylenol Extra Strength) 500 mg tablet Take by mouth every 6 hours if needed for mild pain (1 - 3).      atorvastatin (Lipitor) 10 mg tablet Take 1 tablet (10 mg) by mouth once daily.      chlorhexidine (Peridex) 0.12 % solution Use cap to measure 15 mL.  Swish/gargle mouthwash for at least 30 seconds.  Do not swallow.  Use night before surgery after brushing teeth and morning of surgery after brushing teeth. 473 mL 0    cholecalciferol (Vitamin D-3) 25 MCG (1000 UT) capsule Take by mouth. TAKE AS DIRECTED      Eliquis 5 mg tablet TAKE 1 TABLET TWICE A DAY AS DIRECTED 180 tablet 3    ketoconazole (NIZOral) 2 % cream Apply 1 Application topically 2 times a day. As needed      metoprolol tartrate (Lopressor) 25 mg tablet Take 1 tablet (25 mg) by mouth 2 times a day.      vitamins A,C,E-zinc-copper (ICaps AREDS) 4,296 mcg-226 mg-90 mg capsule Take by mouth. TAKE AS DIRECTED       No current facility-administered medications for this visit.          Review of Systems   Constitutional:  Positive for activity change.   HENT: Negative.     Eyes: Negative.         Glasses   Respiratory: Negative.     Cardiovascular: Negative.         H/o a-fib   Gastrointestinal: Negative.    Genitourinary: Negative.    Musculoskeletal:  Positive for arthralgias and gait problem.        Right knee pain   Skin: Negative.    Psychiatric/Behavioral: Negative.          Physical Exam  Vitals reviewed.   HENT:      Head: Normocephalic and atraumatic.      Mouth/Throat:      Mouth: Mucous membranes are moist.   Eyes:      Pupils: Pupils are equal, round, and reactive to light.      Comments: glasses   Cardiovascular:      Rate and Rhythm: Normal rate. Rhythm irregular.      Comments: h/o a-fib  Pulmonary:      Effort: Pulmonary effort is  "normal.      Breath sounds: Normal breath sounds.   Abdominal:      Palpations: Abdomen is soft.   Musculoskeletal:      Cervical back: Normal range of motion.      Comments: Right knee pain, antalgic gait   Skin:     General: Skin is warm and dry.   Neurological:      Mental Status: He is alert and oriented to person, place, and time.   Psychiatric:         Mood and Affect: Mood normal.          PAT AIRWAY:   Airway:     Mallampati::  II    Neck ROM::  Full  normal        /77   Pulse 98   Temp 35.9 °C (96.6 °F) (Temporal)   Resp 16   Ht 1.956 m (6' 5\")   Wt 90.7 kg (200 lb)   SpO2 95%   BMI 23.72 kg/m²         Stop Bang Score 4     CHADS 2 score: 12.5%  DASI score:  METS score:  Revised cardiac risk index: >11%  ASA III  ARISCAT 1.6%  PAT orders CBC, BMP, UA, MRSA, EKG  EKG done at [facility]-atrial fibrillation with premature ventricular or aberrantly conducted complexes, nonspecific intraventricular conduction delay preliminary awaiting confirmation by cardiologist  Assessment and Plan:     RIGHT KNEE OSTEOARTHRITIS Plan: Right total knee open arthroplasty.  Atrial fibrillation managed with metoprolol and Eliquis follows with Dr. Castaneda  History of CHF echocardiogram done 9/6/2023-left ventricle systolic function is mildly decreased, with ejection fraction of 45 to 50%-Patient has clearance appointment with cardiologist 3/20/2023.  History of TIA-denies residual or recurrence  Hypertension  Hyperlipidemia  Macular degeneration  Essential tremor  BPH  DJD  Osteoarthritis  BMI 24.67    ADDENDUM:  Cardiac clearance given by Dr. Castaneda 3/20/2024     "

## 2024-03-20 ENCOUNTER — OFFICE VISIT (OUTPATIENT)
Dept: PRIMARY CARE | Facility: CLINIC | Age: 84
End: 2024-03-20
Payer: MEDICARE

## 2024-03-20 ENCOUNTER — OFFICE VISIT (OUTPATIENT)
Dept: CARDIOLOGY | Facility: CLINIC | Age: 84
End: 2024-03-20
Payer: MEDICARE

## 2024-03-20 VITALS
DIASTOLIC BLOOD PRESSURE: 57 MMHG | OXYGEN SATURATION: 95 % | HEART RATE: 83 BPM | HEIGHT: 77 IN | TEMPERATURE: 97.3 F | WEIGHT: 201 LBS | BODY MASS INDEX: 23.73 KG/M2 | SYSTOLIC BLOOD PRESSURE: 93 MMHG

## 2024-03-20 VITALS
OXYGEN SATURATION: 98 % | HEART RATE: 70 BPM | BODY MASS INDEX: 23.84 KG/M2 | SYSTOLIC BLOOD PRESSURE: 120 MMHG | DIASTOLIC BLOOD PRESSURE: 70 MMHG | WEIGHT: 201 LBS

## 2024-03-20 DIAGNOSIS — Z01.810 PREOP CARDIOVASCULAR EXAM: Primary | ICD-10-CM

## 2024-03-20 DIAGNOSIS — R73.01 IMPAIRED FASTING GLUCOSE: ICD-10-CM

## 2024-03-20 DIAGNOSIS — M15.9 PRIMARY OSTEOARTHRITIS INVOLVING MULTIPLE JOINTS: Primary | ICD-10-CM

## 2024-03-20 DIAGNOSIS — K08.9: ICD-10-CM

## 2024-03-20 DIAGNOSIS — R35.1 BPH ASSOCIATED WITH NOCTURIA: ICD-10-CM

## 2024-03-20 DIAGNOSIS — H35.3221 EXUDATIVE AGE-RELATED MACULAR DEGENERATION, LEFT EYE, WITH ACTIVE CHOROIDAL NEOVASCULARIZATION (MULTI): ICD-10-CM

## 2024-03-20 DIAGNOSIS — I48.20 CHRONIC ATRIAL FIBRILLATION (MULTI): ICD-10-CM

## 2024-03-20 DIAGNOSIS — N40.1 BPH ASSOCIATED WITH NOCTURIA: ICD-10-CM

## 2024-03-20 DIAGNOSIS — I10 BENIGN ESSENTIAL HYPERTENSION: ICD-10-CM

## 2024-03-20 DIAGNOSIS — Z87.39 HISTORY OF POLYMYALGIA RHEUMATICA: ICD-10-CM

## 2024-03-20 DIAGNOSIS — I50.22 CHRONIC SYSTOLIC CONGESTIVE HEART FAILURE (MULTI): ICD-10-CM

## 2024-03-20 DIAGNOSIS — G25.0 ESSENTIAL TREMOR: ICD-10-CM

## 2024-03-20 DIAGNOSIS — E78.00 PURE HYPERCHOLESTEROLEMIA: ICD-10-CM

## 2024-03-20 PROCEDURE — 1125F AMNT PAIN NOTED PAIN PRSNT: CPT | Performed by: INTERNAL MEDICINE

## 2024-03-20 PROCEDURE — 1157F ADVNC CARE PLAN IN RCRD: CPT | Performed by: INTERNAL MEDICINE

## 2024-03-20 PROCEDURE — 3074F SYST BP LT 130 MM HG: CPT | Performed by: INTERNAL MEDICINE

## 2024-03-20 PROCEDURE — 1036F TOBACCO NON-USER: CPT | Performed by: INTERNAL MEDICINE

## 2024-03-20 PROCEDURE — 1160F RVW MEDS BY RX/DR IN RCRD: CPT | Performed by: INTERNAL MEDICINE

## 2024-03-20 PROCEDURE — 99213 OFFICE O/P EST LOW 20 MIN: CPT | Performed by: INTERNAL MEDICINE

## 2024-03-20 PROCEDURE — 99214 OFFICE O/P EST MOD 30 MIN: CPT | Performed by: INTERNAL MEDICINE

## 2024-03-20 PROCEDURE — 3078F DIAST BP <80 MM HG: CPT | Performed by: INTERNAL MEDICINE

## 2024-03-20 PROCEDURE — 1159F MED LIST DOCD IN RCRD: CPT | Performed by: INTERNAL MEDICINE

## 2024-03-20 ASSESSMENT — PAIN SCALES - GENERAL
PAINLEVEL: 8
PAINLEVEL: 8

## 2024-03-20 ASSESSMENT — ENCOUNTER SYMPTOMS
COUGH: 0
RESPIRATORY NEGATIVE: 1
FEVER: 0
CHILLS: 0
PALPITATIONS: 0
CONSTITUTIONAL NEGATIVE: 1
NEUROLOGICAL NEGATIVE: 1
EYES NEGATIVE: 1
SHORTNESS OF BREATH: 0
GASTROINTESTINAL NEGATIVE: 1
ENDOCRINE NEGATIVE: 1

## 2024-03-20 ASSESSMENT — PATIENT HEALTH QUESTIONNAIRE - PHQ9
2. FEELING DOWN, DEPRESSED OR HOPELESS: NOT AT ALL
SUM OF ALL RESPONSES TO PHQ9 QUESTIONS 1 AND 2: 0
1. LITTLE INTEREST OR PLEASURE IN DOING THINGS: NOT AT ALL

## 2024-03-20 NOTE — PATIENT INSTRUCTIONS
Dr.Detore Amaya would recommend. Get labs done 1 week before apt.      Diagnoses and all orders for this visit:  Primary osteoarthritis involving multiple joints  Comments:  Medically cleared by  and myself. for right knee replacement as long as dentist has no issues with teeth  Benign essential hypertension  -     Comprehensive Metabolic Panel; Future  Chronic atrial fibrillation (CMS/HCC)  Comments:  Stop Eliquis 2 days before surgery resume per , typically day of surgery.  Impaired fasting glucose  Comments:  No worries, blood sugar 105  Orders:  -     CBC and Auto Differential; Future  -     Hemoglobin A1C; Future  Pure hypercholesterolemia  -     Lipid Panel; Future  Essential tremor  History of polymyalgia rheumatica  -     Sedimentation Rate; Future  -     C-reactive protein; Future  Exudative age-related macular degeneration, left eye, with active choroidal neovascularization (CMS/HCC)  Damage to supporting structure of tooth  Comments:  Will need to get clearance for surgery based on  dentist eval teeth on left side, is there infection? Removal? When knee surgery to be done?  Chronic systolic congestive heart failure (CMS/HCC)  BPH associated with nocturia  Comments:  May need Flomax 0.4 mg at night.Let's see. Would not restart at this point  with upcoming surgery.

## 2024-03-20 NOTE — ASSESSMENT & PLAN NOTE
He is to have the TKR on April 1st and no angina and no chf.  He has the chronic afib and is stable.  Will clear for surgery.

## 2024-03-20 NOTE — PROGRESS NOTES
[facility]: [facility]  PHYSICAL EXAM      Qamar Mckinney is a 83 y.o. male that is presenting today for Pre-op Clearance (Right knee replacement).    Assessment/Plan   Diagnoses and all orders for this visit:  Primary osteoarthritis involving multiple joints  Comments:  Medically cleared by  and myself. for right knee replacement as long as dentist has no issues with teeth  Benign essential hypertension  -     Comprehensive Metabolic Panel; Future  Chronic atrial fibrillation (CMS/HCC)  Comments:  Stop Eliquis 2 days before surgery resume per , typically day of surgery.  Impaired fasting glucose  Comments:  No worries, blood sugar 105  Orders:  -     CBC and Auto Differential; Future  -     Hemoglobin A1C; Future  Pure hypercholesterolemia  Essential tremor  History of polymyalgia rheumatica  -     Sedimentation Rate; Future  -     C-reactive protein; Future  Exudative age-related macular degeneration, left eye, with active choroidal neovascularization (CMS/HCC)  Damage to supporting structure of tooth  Comments:  Will need to get clearance for surgery based on  dentist.  Chronic systolic congestive heart failure (CMS/HCC)    Subjective   Having issues with teeth upper and lower tooth infection left upper and lower , no sx will need to be cleared prior to surgery. No CP/SOB.    Review of Systems   Respiratory:  Negative for shortness of breath.    Cardiovascular:  Negative for chest pain and palpitations.   All other systems reviewed and are negative.     Objective   Vitals:    03/20/24 1108   BP: 93/57   Pulse: 83   Temp: 36.3 °C (97.3 °F)   SpO2: 95%     Body mass index is 23.84 kg/m².  Physical Exam  Constitutional:       General: He is not in acute distress.     Appearance: He is not toxic-appearing.   HENT:      Head: Normocephalic and atraumatic.      Right Ear: Tympanic membrane and ear canal normal.      Left Ear: Tympanic membrane and ear canal  normal.      Nose: Nose normal.      Mouth/Throat:      Pharynx: Oropharynx is clear.   Eyes:      Extraocular Movements: Extraocular movements intact.      Pupils: Pupils are equal, round, and reactive to light.   Cardiovascular:      Rate and Rhythm: Normal rate. Rhythm irregular.      Heart sounds: Normal heart sounds. No murmur heard.  Pulmonary:      Breath sounds: Normal breath sounds.   Abdominal:      General: Bowel sounds are normal. There is no distension.      Palpations: Abdomen is soft. There is no mass.      Tenderness: There is no abdominal tenderness.   Musculoskeletal:         General: Tenderness (B knee tenderness OA, N/V intact. Right shoulder tenderness) present. No swelling.      Right lower leg: Edema (trace) present.      Left lower leg: Edema (trace) present.   Skin:     General: Skin is warm and dry.   Neurological:      General: No focal deficit present.      Mental Status: He is oriented to person, place, and time.      Sensory: No sensory deficit.      Motor: No weakness.      Gait: Gait abnormal (DJD knees.).      Deep Tendon Reflexes: Reflexes normal.      Comments: Left hand minor essential tremor     Diagnostic Results   Lab Results   Component Value Date    GLUCOSE 105 (H) 03/19/2024    CALCIUM 9.4 03/19/2024     03/19/2024    K 4.3 03/19/2024    CO2 28 03/19/2024     03/19/2024    BUN 19 03/19/2024    CREATININE 1.00 03/19/2024     Lab Results   Component Value Date    ALT 13 12/01/2023    AST 16 12/01/2023    ALKPHOS 82 12/01/2023    BILITOT 1.0 12/01/2023     Lab Results   Component Value Date    WBC 8.1 03/19/2024    HGB 13.6 03/19/2024    HCT 41.3 03/19/2024    MCV 91 03/19/2024     03/19/2024     Lab Results   Component Value Date    CHOL 148 12/01/2023    CHOL 158 09/06/2023    CHOL 143 02/16/2023     Lab Results   Component Value Date    HDL 57.0 12/01/2023    HDL 63 09/06/2023    HDL 45 02/16/2023     Lab Results   Component Value Date    LDLCALC 75  "12/01/2023    LDLCALC 73 09/06/2023    LDLCALC 82 02/16/2023     Lab Results   Component Value Date    TRIG 80 12/01/2023    TRIG 108 09/06/2023    TRIG 79 02/16/2023     No components found for: \"CHOLHDL\"  Lab Results   Component Value Date    HGBA1C 5.6 12/01/2023     Other labs not included in the list above were reviewed either before or during this encounter.    History   Past Medical History:   Diagnosis Date   • Benign essential hypertension 04/04/2022   • Benign prostatic hyperplasia without urinary obstruction 12/01/2023   • Bilateral inguinal hernia without obstruction or gangrene 09/13/2023   • CHF (congestive heart failure) (CMS/[facility])    • Chronic atrial fibrillation (CMS/HCC) 09/13/2023   • Congestive heart failure (CMS/HCC) 12/01/2023    EF 55% 4/22 borderline dilation.   • DDD (degenerative disc disease), lumbar    • Degenerative joint disease 09/13/2023   • Eczema    • Essential tremor    • History of polymyalgia rheumatica 03/20/2024    DX 6/23 tx 6 months, off Prednisone end of 12/23 inflammatory markers normal.   • History of TIA (transient ischemic attack) 09/13/2023   • IFG (impaired fasting glucose)    • Onychomycosis    • Osteoarthritis    • Osteoarthritis of both knees 03/18/2021   • Other conditions influencing health status     Nephrolithiasis   • Polymyalgia rheumatica (CMS/HCC)    • Spondylosis without myelopathy or radiculopathy, lumbosacral region    • Unspecified abdominal hernia without obstruction or gangrene     Hernia     Past Surgical History:   Procedure Laterality Date   • CATARACT EXTRACTION Left    • OTHER SURGICAL HISTORY      Hernia repair     Family History   Problem Relation Name Age of Onset   • No Known Problems Mother     • Colon cancer Father       Social History     Socioeconomic History   • Marital status:      Spouse name: Not on file   • Number of children: Not on file   • Years of education: Not on file   • Highest education level: Not on file   Occupational " History   • Not on file   Tobacco Use   • Smoking status: Never     Passive exposure: Never   • Smokeless tobacco: Never   Vaping Use   • Vaping Use: Never used   Substance and Sexual Activity   • Alcohol use: Yes     Alcohol/week: 2.0 - 3.0 standard drinks of alcohol     Types: 2 - 3 Glasses of wine per week     Comment: wine   • Drug use: Never   • Sexual activity: Not on file   Other Topics Concern   • Not on file   Social History Narrative   • Not on file     Social Determinants of Health     Financial Resource Strain: Not on file   Food Insecurity: Not on file   Transportation Needs: Not on file   Physical Activity: Not on file   Stress: Not on file   Social Connections: Not on file   Intimate Partner Violence: Not on file   Housing Stability: Not on file     Allergies   Allergen Reactions   • Furosemide Other     Lightheadedness, BP Drop     Current Outpatient Medications on File Prior to Visit   Medication Sig Dispense Refill   • acetaminophen (Tylenol Extra Strength) 500 mg tablet Take by mouth every 6 hours if needed for mild pain (1 - 3).     • atorvastatin (Lipitor) 10 mg tablet Take 1 tablet (10 mg) by mouth once daily.     • cholecalciferol (Vitamin D-3) 25 MCG (1000 UT) capsule Take by mouth. TAKE AS DIRECTED     • Eliquis 5 mg tablet TAKE 1 TABLET TWICE A DAY AS DIRECTED 180 tablet 3   • metoprolol tartrate (Lopressor) 25 mg tablet Take 1 tablet (25 mg) by mouth 2 times a day.     • vitamins A,C,E-zinc-copper (ICaps AREDS) 4,296 mcg-226 mg-90 mg capsule Take by mouth. TAKE AS DIRECTED     • [DISCONTINUED] chlorhexidine (Peridex) 0.12 % solution Use cap to measure 15 mL.  Swish/gargle mouthwash for at least 30 seconds.  Do not swallow.  Use night before surgery after brushing teeth and morning of surgery after brushing teeth. (Patient not taking: Reported on 3/20/2024) 473 mL 0   • [DISCONTINUED] ketoconazole (NIZOral) 2 % cream Apply 1 Application topically 2 times a day. As needed       No current  facility-administered medications on file prior to visit.     Immunization History   Administered Date(s) Administered   • Flu vaccine, quadrivalent, high-dose, preservative free, age 65y+ (FLUZONE) 10/26/2020, 11/20/2023   • Influenza, High Dose Seasonal, Preservative Free 10/24/2018   • Influenza, Seasonal, Quadrivalent, Adjuvanted 10/25/2021   • Influenza, Unspecified 09/28/2009, 10/24/2018   • Influenza, injectable, quadrivalent 09/26/2019   • Influenza, seasonal, injectable 09/08/2015, 09/21/2015   • Moderna COVID-19 vaccine, bivalent, blue cap/gray label *Check age/dose* 09/28/2022   • Pfizer COVID-19 vaccine, Fall 2023, 12 years and older, (30mcg/0.3mL) 12/30/2023   • Pfizer Purple Cap SARS-CoV-2 01/28/2021, 02/25/2021, 10/04/2021   • Pneumococcal polysaccharide vaccine, 23-valent, age 2 years and older (PNEUMOVAX 23) 09/02/2013, 08/08/2014, 06/19/2018   • RESPIRATORY SYNCYTIAL VIRUS (RSV), ELIGIBLE PREGNANT PTS, 0.5 ML (ABRYSVO) 10/24/2023   • Zoster vaccine, recombinant, adult (SHINGRIX) 12/11/2018, 02/18/2019   • Zoster, live 05/13/2022     Patient's medical history was reviewed and updated either before or during this encounter.       Waqas Fernandez MD

## 2024-03-20 NOTE — PROGRESS NOTES
Subjective      Chief Complaint   Patient presents with    SC April 1 2024 TKR          HE is to have the right knee replaced on April 1st.  Is doing well and is active.  He is not complaining of chest discomfort.  NO PND or orthopnea.  The legs are not swelling on him.  He does not complain of palpitations. The swelling in the  legs are better and has varicose veins.  His EKG shows the afib           Review of Systems   Constitutional: Negative. Negative for chills and fever.   HENT: Negative.     Eyes: Negative.    Respiratory: Negative.  Negative for cough.    Endocrine: Negative.    Skin: Negative.    Musculoskeletal:  Positive for joint pain.   Gastrointestinal: Negative.    Genitourinary: Negative.    Neurological: Negative.    All other systems reviewed and are negative.       Past Surgical History:   Procedure Laterality Date    CATARACT EXTRACTION Left     OTHER SURGICAL HISTORY      Hernia repair        Active Ambulatory Problems     Diagnosis Date Noted    Bilateral inguinal hernia without obstruction or gangrene 09/13/2023    BPH associated with nocturia 08/11/2021    DDD (degenerative disc disease), lumbar 09/13/2023    Degenerative joint disease 09/13/2023    Essential tremor 09/13/2023    History of TIA (transient ischemic attack) 09/13/2023    Hyperlipidemia, unspecified 06/20/2019    Onychomycosis 01/22/2019    Osteoarthritis of both knees 03/18/2021    Chronic atrial fibrillation (CMS/HCC) 09/13/2023    Pure hypercholesterolemia 09/13/2023    Stroke (CMS/[facility]) 09/13/2023    Vitamin D deficiency 09/13/2023    Benign essential hypertension 04/04/2022    Benign prostatic hyperplasia without urinary obstruction 12/01/2023    Cataract 12/01/2023    Chronic arthritis 12/01/2023    Congestive heart failure (CMS/HCC) 12/01/2023    Disease of spinal cord (CMS/[facility]) 05/31/2023    Disorder of prostate 12/01/2023    Dyslipidemia 12/01/2023    Eczema 12/01/2023    Impaired fasting glucose 06/23/2020    Impairment  of balance 12/01/2023    Disorder of rotator cuff 12/01/2023    Knee pain 12/01/2023    Olecranon bursitis of left elbow 07/22/2020    Sensorineural hearing loss (SNHL) of both ears 12/01/2023    Unilateral primary osteoarthritis, right knee 01/09/2024    Bilateral impacted cerumen 02/06/2024    History of polymyalgia rheumatica 03/20/2024    Preop cardiovascular exam 03/20/2024     Resolved Ambulatory Problems     Diagnosis Date Noted    Polymyalgia rheumatica (CMS/[facility]) 12/01/2023     Past Medical History:   Diagnosis Date    CHF (congestive heart failure) (CMS/[facility])     IFG (impaired fasting glucose)     Osteoarthritis     Other conditions influencing health status     Spondylosis without myelopathy or radiculopathy, lumbosacral region     Unspecified abdominal hernia without obstruction or gangrene         Visit Vitals  /70   Pulse 70   Wt 91.2 kg (201 lb)   SpO2 98%   BMI 23.84 kg/m²   Smoking Status Never   BSA 2.23 m²        Objective     Constitutional:       Appearance: Healthy appearance.   Eyes:      Pupils: Pupils are equal, round, and reactive to light.   Neck:      Vascular: JVD normal.   Pulmonary:      Breath sounds: Normal breath sounds.   Cardiovascular:      PMI at left midclavicular line. Normal rate. Irregularly irregular rhythm. Normal S1. Normal S2.       Murmurs: There is no murmur.      No gallop.  No click. No rub.   Pulses:     Intact distal pulses.   Edema:     Peripheral edema absent.   Abdominal:      Palpations: Abdomen is soft.      Tenderness: There is no abdominal tenderness.   Musculoskeletal:      Extremities: No clubbing present.Skin:     General: Skin is warm and dry.   Neurological:      General: No focal deficit present.            Lab Review:         Lab Results   Component Value Date    CHOL 148 12/01/2023    CHOL 158 09/06/2023    CHOL 143 02/16/2023     Lab Results   Component Value Date    HDL 57.0 12/01/2023    HDL 63 09/06/2023    HDL 45 02/16/2023     Lab Results  "  Component Value Date    LDLCALC 75 12/01/2023    LDLCALC 73 09/06/2023    LDLCALC 82 02/16/2023     Lab Results   Component Value Date    TRIG 80 12/01/2023    TRIG 108 09/06/2023    TRIG 79 02/16/2023     No components found for: \"CHOLHDL\"     Assessment/Plan     Preop cardiovascular exam  He is to have the TKR on April 1st and no angina and no chf.  He has the chronic afib and is stable.  Will clear for surgery.     "

## 2024-03-21 LAB — STAPHYLOCOCCUS SPEC CULT: NORMAL

## 2024-03-22 LAB
ATRIAL RATE: 96 BPM
Q ONSET: 225 MS
QRS COUNT: 16 BEATS
QRS DURATION: 118 MS
QT INTERVAL: 372 MS
QTC CALCULATION(BAZETT): 469 MS
QTC FREDERICIA: 435 MS
R AXIS: 1 DEGREES
T AXIS: 26 DEGREES
T OFFSET: 411 MS
VENTRICULAR RATE: 96 BPM

## 2024-04-01 ENCOUNTER — APPOINTMENT (OUTPATIENT)
Dept: RADIOLOGY | Facility: HOSPITAL | Age: 84
DRG: 470 | End: 2024-04-01
Payer: MEDICARE

## 2024-04-01 ENCOUNTER — ANESTHESIA (OUTPATIENT)
Dept: OPERATING ROOM | Facility: HOSPITAL | Age: 84
DRG: 470 | End: 2024-04-01
Payer: MEDICARE

## 2024-04-01 ENCOUNTER — HOME HEALTH ADMISSION (OUTPATIENT)
Dept: HOME HEALTH SERVICES | Facility: HOME HEALTH | Age: 84
End: 2024-04-01
Payer: MEDICARE

## 2024-04-01 ENCOUNTER — HOSPITAL ENCOUNTER (INPATIENT)
Facility: HOSPITAL | Age: 84
LOS: 4 days | Discharge: HOME | DRG: 470 | End: 2024-04-05
Attending: STUDENT IN AN ORGANIZED HEALTH CARE EDUCATION/TRAINING PROGRAM | Admitting: STUDENT IN AN ORGANIZED HEALTH CARE EDUCATION/TRAINING PROGRAM
Payer: MEDICARE

## 2024-04-01 DIAGNOSIS — K59.03 DRUG-INDUCED CONSTIPATION: ICD-10-CM

## 2024-04-01 DIAGNOSIS — I48.20 CHRONIC ATRIAL FIBRILLATION (MULTI): ICD-10-CM

## 2024-04-01 DIAGNOSIS — R35.1 BPH ASSOCIATED WITH NOCTURIA: ICD-10-CM

## 2024-04-01 DIAGNOSIS — N40.1 BPH ASSOCIATED WITH NOCTURIA: ICD-10-CM

## 2024-04-01 DIAGNOSIS — M17.11 UNILATERAL PRIMARY OSTEOARTHRITIS, RIGHT KNEE: ICD-10-CM

## 2024-04-01 DIAGNOSIS — Z96.651 S/P TOTAL KNEE ARTHROPLASTY, RIGHT: Primary | ICD-10-CM

## 2024-04-01 PROCEDURE — 2780000003 HC OR 278 NO HCPCS: Performed by: STUDENT IN AN ORGANIZED HEALTH CARE EDUCATION/TRAINING PROGRAM

## 2024-04-01 PROCEDURE — A4649 SURGICAL SUPPLIES: HCPCS | Performed by: STUDENT IN AN ORGANIZED HEALTH CARE EDUCATION/TRAINING PROGRAM

## 2024-04-01 PROCEDURE — 3700000002 HC GENERAL ANESTHESIA TIME - EACH INCREMENTAL 1 MINUTE: Performed by: STUDENT IN AN ORGANIZED HEALTH CARE EDUCATION/TRAINING PROGRAM

## 2024-04-01 PROCEDURE — 2500000004 HC RX 250 GENERAL PHARMACY W/ HCPCS (ALT 636 FOR OP/ED): Performed by: ANESTHESIOLOGY

## 2024-04-01 PROCEDURE — 2500000005 HC RX 250 GENERAL PHARMACY W/O HCPCS: Performed by: STUDENT IN AN ORGANIZED HEALTH CARE EDUCATION/TRAINING PROGRAM

## 2024-04-01 PROCEDURE — 2720000007 HC OR 272 NO HCPCS: Performed by: STUDENT IN AN ORGANIZED HEALTH CARE EDUCATION/TRAINING PROGRAM

## 2024-04-01 PROCEDURE — 2500000004 HC RX 250 GENERAL PHARMACY W/ HCPCS (ALT 636 FOR OP/ED): Performed by: STUDENT IN AN ORGANIZED HEALTH CARE EDUCATION/TRAINING PROGRAM

## 2024-04-01 PROCEDURE — 2500000001 HC RX 250 WO HCPCS SELF ADMINISTERED DRUGS (ALT 637 FOR MEDICARE OP): Performed by: STUDENT IN AN ORGANIZED HEALTH CARE EDUCATION/TRAINING PROGRAM

## 2024-04-01 PROCEDURE — 7100000011 HC EXTENDED STAY RECOVERY HOURLY - NURSING UNIT

## 2024-04-01 PROCEDURE — 1100000001 HC PRIVATE ROOM DAILY

## 2024-04-01 PROCEDURE — 2500000004 HC RX 250 GENERAL PHARMACY W/ HCPCS (ALT 636 FOR OP/ED): Performed by: NURSE ANESTHETIST, CERTIFIED REGISTERED

## 2024-04-01 PROCEDURE — C1776 JOINT DEVICE (IMPLANTABLE): HCPCS | Performed by: STUDENT IN AN ORGANIZED HEALTH CARE EDUCATION/TRAINING PROGRAM

## 2024-04-01 PROCEDURE — G0378 HOSPITAL OBSERVATION PER HR: HCPCS

## 2024-04-01 PROCEDURE — 7100000001 HC RECOVERY ROOM TIME - INITIAL BASE CHARGE: Performed by: STUDENT IN AN ORGANIZED HEALTH CARE EDUCATION/TRAINING PROGRAM

## 2024-04-01 PROCEDURE — 73560 X-RAY EXAM OF KNEE 1 OR 2: CPT | Mod: RIGHT SIDE | Performed by: RADIOLOGY

## 2024-04-01 PROCEDURE — 73560 X-RAY EXAM OF KNEE 1 OR 2: CPT | Mod: RT

## 2024-04-01 PROCEDURE — 3700000001 HC GENERAL ANESTHESIA TIME - INITIAL BASE CHARGE: Performed by: STUDENT IN AN ORGANIZED HEALTH CARE EDUCATION/TRAINING PROGRAM

## 2024-04-01 PROCEDURE — C1713 ANCHOR/SCREW BN/BN,TIS/BN: HCPCS | Performed by: STUDENT IN AN ORGANIZED HEALTH CARE EDUCATION/TRAINING PROGRAM

## 2024-04-01 PROCEDURE — 7100000002 HC RECOVERY ROOM TIME - EACH INCREMENTAL 1 MINUTE: Performed by: STUDENT IN AN ORGANIZED HEALTH CARE EDUCATION/TRAINING PROGRAM

## 2024-04-01 PROCEDURE — 3600000018 HC OR TIME - INITIAL BASE CHARGE - PROCEDURE LEVEL SIX: Performed by: STUDENT IN AN ORGANIZED HEALTH CARE EDUCATION/TRAINING PROGRAM

## 2024-04-01 PROCEDURE — 2500000001 HC RX 250 WO HCPCS SELF ADMINISTERED DRUGS (ALT 637 FOR MEDICARE OP): Performed by: NURSE PRACTITIONER

## 2024-04-01 PROCEDURE — 0SRC0J9 REPLACEMENT OF RIGHT KNEE JOINT WITH SYNTHETIC SUBSTITUTE, CEMENTED, OPEN APPROACH: ICD-10-PCS | Performed by: STUDENT IN AN ORGANIZED HEALTH CARE EDUCATION/TRAINING PROGRAM

## 2024-04-01 PROCEDURE — 93010 ELECTROCARDIOGRAM REPORT: CPT | Performed by: INTERNAL MEDICINE

## 2024-04-01 PROCEDURE — 3600000017 HC OR TIME - EACH INCREMENTAL 1 MINUTE - PROCEDURE LEVEL SIX: Performed by: STUDENT IN AN ORGANIZED HEALTH CARE EDUCATION/TRAINING PROGRAM

## 2024-04-01 DEVICE — UNIVERSAL TIBIAL BASEPLATE
Type: IMPLANTABLE DEVICE | Site: KNEE | Status: FUNCTIONAL
Brand: TRIATHLON

## 2024-04-01 DEVICE — ASYMMETRIC PATELLA
Type: IMPLANTABLE DEVICE | Site: KNEE | Status: FUNCTIONAL
Brand: TRIATHLON

## 2024-04-01 DEVICE — SIMPLEX® HV IS A FAST-SETTING ACRYLIC RESIN FOR USE IN BONE SURGERY. MIXING THE TWO SEPARATE STERILE COMPONENTS PRODUCES A DUCTILE BONE CEMENT WHICH, AFTER HARDENING, FIXES THE IMPLANT AND TRANSFERS STRESSES PRODUCED DURING MOVEMENT EVENLY TO THE BONE. SIMPLEX® HV CEMENT POWDER ALSO CONTAINS INSOLUBLE ZIRCONIUM DIOXIDE AS AN X-RAY CONTRAST MEDIUM. SIMPLEX® HV DOES NOT EMIT A SIGNAL AND DOES NOT POSE A SAFETY RISK IN A MAGNETIC RESONANCE ENVIRONMENT.
Type: IMPLANTABLE DEVICE | Site: KNEE | Status: FUNCTIONAL
Brand: SIMPLEX HV

## 2024-04-01 DEVICE — FEMORAL DISTAL FIXATION PEG
Type: IMPLANTABLE DEVICE | Site: KNEE | Status: FUNCTIONAL
Brand: TRIATHLON

## 2024-04-01 DEVICE — IMPLANTABLE DEVICE: Type: IMPLANTABLE DEVICE | Site: KNEE | Status: FUNCTIONAL

## 2024-04-01 DEVICE — POSTERIOR STABILIZED FEMORAL
Type: IMPLANTABLE DEVICE | Site: KNEE | Status: FUNCTIONAL
Brand: TRIATHLON

## 2024-04-01 RX ORDER — NALOXONE HYDROCHLORIDE 0.4 MG/ML
0.2 INJECTION, SOLUTION INTRAMUSCULAR; INTRAVENOUS; SUBCUTANEOUS EVERY 5 MIN PRN
Status: DISCONTINUED | OUTPATIENT
Start: 2024-04-01 | End: 2024-04-05 | Stop reason: HOSPADM

## 2024-04-01 RX ORDER — MIDAZOLAM HYDROCHLORIDE 5 MG/ML
2 INJECTION, SOLUTION INTRAMUSCULAR; INTRAVENOUS ONCE
Status: COMPLETED | OUTPATIENT
Start: 2024-04-01 | End: 2024-04-01

## 2024-04-01 RX ORDER — ATORVASTATIN CALCIUM 10 MG/1
10 TABLET, FILM COATED ORAL DAILY
Status: DISCONTINUED | OUTPATIENT
Start: 2024-04-01 | End: 2024-04-05 | Stop reason: HOSPADM

## 2024-04-01 RX ORDER — TRANEXAMIC ACID 100 MG/ML
INJECTION, SOLUTION INTRAVENOUS AS NEEDED
Status: DISCONTINUED | OUTPATIENT
Start: 2024-04-01 | End: 2024-04-01

## 2024-04-01 RX ORDER — LABETALOL HYDROCHLORIDE 5 MG/ML
10 INJECTION, SOLUTION INTRAVENOUS ONCE
Status: CANCELLED | OUTPATIENT
Start: 2024-04-01 | End: 2024-04-01

## 2024-04-01 RX ORDER — DILTIAZEM HYDROCHLORIDE 5 MG/ML
20 INJECTION INTRAVENOUS EVERY 2 HOUR PRN
Status: DISCONTINUED | OUTPATIENT
Start: 2024-04-01 | End: 2024-04-01

## 2024-04-01 RX ORDER — LIDOCAINE HYDROCHLORIDE 10 MG/ML
INJECTION, SOLUTION EPIDURAL; INFILTRATION; INTRACAUDAL; PERINEURAL AS NEEDED
Status: DISCONTINUED | OUTPATIENT
Start: 2024-04-01 | End: 2024-04-01

## 2024-04-01 RX ORDER — ACETAMINOPHEN 325 MG/1
650 TABLET ORAL EVERY 4 HOURS PRN
Status: DISCONTINUED | OUTPATIENT
Start: 2024-04-01 | End: 2024-04-05 | Stop reason: HOSPADM

## 2024-04-01 RX ORDER — HYDROMORPHONE HYDROCHLORIDE 2 MG/ML
INJECTION, SOLUTION INTRAMUSCULAR; INTRAVENOUS; SUBCUTANEOUS AS NEEDED
Status: DISCONTINUED | OUTPATIENT
Start: 2024-04-01 | End: 2024-04-01

## 2024-04-01 RX ORDER — SODIUM CHLORIDE 9 MG/ML
100 INJECTION, SOLUTION INTRAVENOUS CONTINUOUS
Status: ACTIVE | OUTPATIENT
Start: 2024-04-01 | End: 2024-04-02

## 2024-04-01 RX ORDER — CEFAZOLIN SODIUM 2 G/100ML
2 INJECTION, SOLUTION INTRAVENOUS EVERY 8 HOURS
Status: COMPLETED | OUTPATIENT
Start: 2024-04-01 | End: 2024-04-02

## 2024-04-01 RX ORDER — LIDOCAINE HYDROCHLORIDE 10 MG/ML
0.1 INJECTION INFILTRATION; PERINEURAL ONCE
Status: DISCONTINUED | OUTPATIENT
Start: 2024-04-01 | End: 2024-04-01 | Stop reason: HOSPADM

## 2024-04-01 RX ORDER — FENTANYL CITRATE 50 UG/ML
50 INJECTION, SOLUTION INTRAMUSCULAR; INTRAVENOUS ONCE
Status: COMPLETED | OUTPATIENT
Start: 2024-04-01 | End: 2024-04-01

## 2024-04-01 RX ORDER — CEFAZOLIN SODIUM 2 G/100ML
2 INJECTION, SOLUTION INTRAVENOUS ONCE
Status: COMPLETED | OUTPATIENT
Start: 2024-04-01 | End: 2024-04-01

## 2024-04-01 RX ORDER — METOPROLOL TARTRATE 25 MG/1
25 TABLET, FILM COATED ORAL 2 TIMES DAILY
Status: DISCONTINUED | OUTPATIENT
Start: 2024-04-01 | End: 2024-04-05 | Stop reason: HOSPADM

## 2024-04-01 RX ORDER — ONDANSETRON HYDROCHLORIDE 2 MG/ML
4 INJECTION, SOLUTION INTRAVENOUS ONCE AS NEEDED
Status: DISCONTINUED | OUTPATIENT
Start: 2024-04-01 | End: 2024-04-01 | Stop reason: HOSPADM

## 2024-04-01 RX ORDER — ONDANSETRON HYDROCHLORIDE 2 MG/ML
4 INJECTION, SOLUTION INTRAVENOUS EVERY 8 HOURS PRN
Status: DISCONTINUED | OUTPATIENT
Start: 2024-04-01 | End: 2024-04-05 | Stop reason: HOSPADM

## 2024-04-01 RX ORDER — HYDROCODONE BITARTRATE AND ACETAMINOPHEN 5; 325 MG/1; MG/1
1 TABLET ORAL EVERY 4 HOURS PRN
Status: DISCONTINUED | OUTPATIENT
Start: 2024-04-01 | End: 2024-04-05 | Stop reason: HOSPADM

## 2024-04-01 RX ORDER — LABETALOL HYDROCHLORIDE 5 MG/ML
INJECTION, SOLUTION INTRAVENOUS AS NEEDED
Status: DISCONTINUED | OUTPATIENT
Start: 2024-04-01 | End: 2024-04-01

## 2024-04-01 RX ORDER — METOPROLOL TARTRATE 1 MG/ML
5 INJECTION, SOLUTION INTRAVENOUS EVERY 6 HOURS PRN
Status: DISCONTINUED | OUTPATIENT
Start: 2024-04-01 | End: 2024-04-05 | Stop reason: HOSPADM

## 2024-04-01 RX ORDER — ALBUTEROL SULFATE 0.83 MG/ML
2.5 SOLUTION RESPIRATORY (INHALATION) ONCE
Status: DISCONTINUED | OUTPATIENT
Start: 2024-04-01 | End: 2024-04-01 | Stop reason: HOSPADM

## 2024-04-01 RX ORDER — ONDANSETRON HYDROCHLORIDE 2 MG/ML
INJECTION, SOLUTION INTRAVENOUS AS NEEDED
Status: DISCONTINUED | OUTPATIENT
Start: 2024-04-01 | End: 2024-04-01

## 2024-04-01 RX ORDER — VANCOMYCIN HYDROCHLORIDE 1 G/20ML
INJECTION, POWDER, LYOPHILIZED, FOR SOLUTION INTRAVENOUS AS NEEDED
Status: DISCONTINUED | OUTPATIENT
Start: 2024-04-01 | End: 2024-04-01 | Stop reason: HOSPADM

## 2024-04-01 RX ORDER — MIDAZOLAM HYDROCHLORIDE 1 MG/ML
1 INJECTION, SOLUTION INTRAMUSCULAR; INTRAVENOUS ONCE AS NEEDED
Status: DISCONTINUED | OUTPATIENT
Start: 2024-04-01 | End: 2024-04-01 | Stop reason: HOSPADM

## 2024-04-01 RX ORDER — SODIUM CHLORIDE, SODIUM LACTATE, POTASSIUM CHLORIDE, CALCIUM CHLORIDE 600; 310; 30; 20 MG/100ML; MG/100ML; MG/100ML; MG/100ML
100 INJECTION, SOLUTION INTRAVENOUS CONTINUOUS
Status: DISCONTINUED | OUTPATIENT
Start: 2024-04-01 | End: 2024-04-02 | Stop reason: ALTCHOICE

## 2024-04-01 RX ORDER — HYDROCODONE BITARTRATE AND ACETAMINOPHEN 5; 325 MG/1; MG/1
2 TABLET ORAL EVERY 4 HOURS PRN
Status: DISCONTINUED | OUTPATIENT
Start: 2024-04-01 | End: 2024-04-05 | Stop reason: HOSPADM

## 2024-04-01 RX ORDER — MEPERIDINE HYDROCHLORIDE 25 MG/ML
12.5 INJECTION INTRAMUSCULAR; INTRAVENOUS; SUBCUTANEOUS EVERY 10 MIN PRN
Status: DISCONTINUED | OUTPATIENT
Start: 2024-04-01 | End: 2024-04-01 | Stop reason: HOSPADM

## 2024-04-01 RX ORDER — ONDANSETRON 4 MG/1
4 TABLET, ORALLY DISINTEGRATING ORAL EVERY 8 HOURS PRN
Status: DISCONTINUED | OUTPATIENT
Start: 2024-04-01 | End: 2024-04-05 | Stop reason: HOSPADM

## 2024-04-01 RX ORDER — PROPOFOL 10 MG/ML
INJECTION, EMULSION INTRAVENOUS AS NEEDED
Status: DISCONTINUED | OUTPATIENT
Start: 2024-04-01 | End: 2024-04-01

## 2024-04-01 RX ORDER — SODIUM CHLORIDE, SODIUM LACTATE, POTASSIUM CHLORIDE, CALCIUM CHLORIDE 600; 310; 30; 20 MG/100ML; MG/100ML; MG/100ML; MG/100ML
100 INJECTION, SOLUTION INTRAVENOUS CONTINUOUS
Status: DISCONTINUED | OUTPATIENT
Start: 2024-04-01 | End: 2024-04-01 | Stop reason: HOSPADM

## 2024-04-01 RX ORDER — FENTANYL CITRATE 50 UG/ML
50 INJECTION, SOLUTION INTRAMUSCULAR; INTRAVENOUS EVERY 5 MIN PRN
Status: DISCONTINUED | OUTPATIENT
Start: 2024-04-01 | End: 2024-04-01 | Stop reason: HOSPADM

## 2024-04-01 RX ORDER — POLYETHYLENE GLYCOL 3350 17 G/17G
17 POWDER, FOR SOLUTION ORAL DAILY
Status: DISCONTINUED | OUTPATIENT
Start: 2024-04-01 | End: 2024-04-05 | Stop reason: HOSPADM

## 2024-04-01 RX ADMIN — HYDROMORPHONE HYDROCHLORIDE 0.2 MG: 2 INJECTION INTRAMUSCULAR; INTRAVENOUS; SUBCUTANEOUS at 13:38

## 2024-04-01 RX ADMIN — HYDROMORPHONE HYDROCHLORIDE 0.2 MG: 2 INJECTION INTRAMUSCULAR; INTRAVENOUS; SUBCUTANEOUS at 12:30

## 2024-04-01 RX ADMIN — HYDROMORPHONE HYDROCHLORIDE 0.4 MG: 2 INJECTION INTRAMUSCULAR; INTRAVENOUS; SUBCUTANEOUS at 11:41

## 2024-04-01 RX ADMIN — SODIUM CHLORIDE, POTASSIUM CHLORIDE, SODIUM LACTATE AND CALCIUM CHLORIDE: 600; 310; 30; 20 INJECTION, SOLUTION INTRAVENOUS at 11:16

## 2024-04-01 RX ADMIN — Medication 10 MG: at 12:59

## 2024-04-01 RX ADMIN — Medication 20 MG: at 12:04

## 2024-04-01 RX ADMIN — MIDAZOLAM HYDROCHLORIDE 2 MG: 5 INJECTION, SOLUTION INTRAMUSCULAR; INTRAVENOUS at 10:31

## 2024-04-01 RX ADMIN — Medication 2 L/MIN: at 16:30

## 2024-04-01 RX ADMIN — HYDROMORPHONE HYDROCHLORIDE 0.4 MG: 2 INJECTION INTRAMUSCULAR; INTRAVENOUS; SUBCUTANEOUS at 11:48

## 2024-04-01 RX ADMIN — Medication 20 MG: at 11:51

## 2024-04-01 RX ADMIN — FENTANYL CITRATE 50 MCG: 50 INJECTION INTRAMUSCULAR; INTRAVENOUS at 14:36

## 2024-04-01 RX ADMIN — CEFAZOLIN SODIUM 2 G: 2 INJECTION, SOLUTION INTRAVENOUS at 20:37

## 2024-04-01 RX ADMIN — HYDROMORPHONE HYDROCHLORIDE 0.4 MG: 2 INJECTION INTRAMUSCULAR; INTRAVENOUS; SUBCUTANEOUS at 11:53

## 2024-04-01 RX ADMIN — FENTANYL CITRATE 50 MCG: 50 INJECTION INTRAMUSCULAR; INTRAVENOUS at 14:06

## 2024-04-01 RX ADMIN — LABETALOL HYDROCHLORIDE 5 MG: 5 INJECTION, SOLUTION INTRAVENOUS at 11:58

## 2024-04-01 RX ADMIN — LABETALOL HYDROCHLORIDE 5 MG: 5 INJECTION, SOLUTION INTRAVENOUS at 13:23

## 2024-04-01 RX ADMIN — PROPOFOL 25 MG: 10 INJECTION, EMULSION INTRAVENOUS at 12:37

## 2024-04-01 RX ADMIN — TRANEXAMIC ACID 1000 MG: 100 INJECTION, SOLUTION INTRAVENOUS at 13:16

## 2024-04-01 RX ADMIN — SODIUM CHLORIDE 100 ML/HR: 900 INJECTION, SOLUTION INTRAVENOUS at 16:45

## 2024-04-01 RX ADMIN — TRANEXAMIC ACID 1000 MG: 100 INJECTION, SOLUTION INTRAVENOUS at 11:30

## 2024-04-01 RX ADMIN — ATORVASTATIN CALCIUM 10 MG: 10 TABLET, FILM COATED ORAL at 16:45

## 2024-04-01 RX ADMIN — SODIUM CHLORIDE, SODIUM LACTATE, POTASSIUM CHLORIDE, AND CALCIUM CHLORIDE 100 ML/HR: 600; 310; 30; 20 INJECTION, SOLUTION INTRAVENOUS at 14:42

## 2024-04-01 RX ADMIN — LABETALOL HYDROCHLORIDE 5 MG: 5 INJECTION, SOLUTION INTRAVENOUS at 11:55

## 2024-04-01 RX ADMIN — HYDROMORPHONE HYDROCHLORIDE 0.2 MG: 2 INJECTION INTRAMUSCULAR; INTRAVENOUS; SUBCUTANEOUS at 11:45

## 2024-04-01 RX ADMIN — ONDANSETRON HYDROCHLORIDE 4 MG: 2 INJECTION INTRAMUSCULAR; INTRAVENOUS at 13:12

## 2024-04-01 RX ADMIN — CEFAZOLIN SODIUM 2 G: 2 INJECTION, SOLUTION INTRAVENOUS at 11:30

## 2024-04-01 RX ADMIN — PROPOFOL 25 MG: 10 INJECTION, EMULSION INTRAVENOUS at 12:29

## 2024-04-01 RX ADMIN — HYDROCODONE BITARTRATE AND ACETAMINOPHEN 1 TABLET: 5; 325 TABLET ORAL at 16:48

## 2024-04-01 RX ADMIN — LABETALOL HYDROCHLORIDE 5 MG: 5 INJECTION, SOLUTION INTRAVENOUS at 13:40

## 2024-04-01 RX ADMIN — HYDROMORPHONE HYDROCHLORIDE 0.2 MG: 2 INJECTION INTRAMUSCULAR; INTRAVENOUS; SUBCUTANEOUS at 13:31

## 2024-04-01 RX ADMIN — POLYETHYLENE GLYCOL 3350 17 G: 17 POWDER, FOR SOLUTION ORAL at 16:45

## 2024-04-01 RX ADMIN — FENTANYL CITRATE 50 MCG: 50 INJECTION INTRAMUSCULAR; INTRAVENOUS at 14:55

## 2024-04-01 RX ADMIN — METOPROLOL TARTRATE 25 MG: 25 TABLET, FILM COATED ORAL at 20:25

## 2024-04-01 RX ADMIN — SODIUM CHLORIDE, POTASSIUM CHLORIDE, SODIUM LACTATE AND CALCIUM CHLORIDE: 600; 310; 30; 20 INJECTION, SOLUTION INTRAVENOUS at 12:39

## 2024-04-01 RX ADMIN — LABETALOL HYDROCHLORIDE 5 MG: 5 INJECTION, SOLUTION INTRAVENOUS at 13:45

## 2024-04-01 RX ADMIN — DEXAMETHASONE SODIUM PHOSPHATE 8 MG: 4 INJECTION, SOLUTION INTRAMUSCULAR; INTRAVENOUS at 11:30

## 2024-04-01 RX ADMIN — PROPOFOL 150 MG: 10 INJECTION, EMULSION INTRAVENOUS at 11:22

## 2024-04-01 RX ADMIN — LABETALOL HYDROCHLORIDE 5 MG: 5 INJECTION, SOLUTION INTRAVENOUS at 13:28

## 2024-04-01 RX ADMIN — LIDOCAINE HYDROCHLORIDE 5 ML: 10 INJECTION, SOLUTION EPIDURAL; INFILTRATION; INTRACAUDAL; PERINEURAL at 11:22

## 2024-04-01 RX ADMIN — FENTANYL CITRATE 50 MCG: 50 INJECTION INTRAMUSCULAR; INTRAVENOUS at 14:22

## 2024-04-01 RX ADMIN — FENTANYL CITRATE 50 MCG: 50 INJECTION INTRAMUSCULAR; INTRAVENOUS at 10:30

## 2024-04-01 SDOH — SOCIAL STABILITY: SOCIAL INSECURITY: ARE YOU OR HAVE YOU BEEN THREATENED OR ABUSED PHYSICALLY, EMOTIONALLY, OR SEXUALLY BY ANYONE?: NO

## 2024-04-01 SDOH — SOCIAL STABILITY: SOCIAL INSECURITY: DO YOU FEEL UNSAFE GOING BACK TO THE PLACE WHERE YOU ARE LIVING?: NO

## 2024-04-01 SDOH — SOCIAL STABILITY: SOCIAL INSECURITY: ARE THERE ANY APPARENT SIGNS OF INJURIES/BEHAVIORS THAT COULD BE RELATED TO ABUSE/NEGLECT?: NO

## 2024-04-01 SDOH — SOCIAL STABILITY: SOCIAL INSECURITY: DOES ANYONE TRY TO KEEP YOU FROM HAVING/CONTACTING OTHER FRIENDS OR DOING THINGS OUTSIDE YOUR HOME?: NO

## 2024-04-01 SDOH — SOCIAL STABILITY: SOCIAL INSECURITY: HAS ANYONE EVER THREATENED TO HURT YOUR FAMILY OR YOUR PETS?: NO

## 2024-04-01 SDOH — SOCIAL STABILITY: SOCIAL INSECURITY: ABUSE: ADULT

## 2024-04-01 SDOH — SOCIAL STABILITY: SOCIAL INSECURITY: HAVE YOU HAD THOUGHTS OF HARMING ANYONE ELSE?: NO

## 2024-04-01 SDOH — SOCIAL STABILITY: SOCIAL INSECURITY: WERE YOU ABLE TO COMPLETE ALL THE BEHAVIORAL HEALTH SCREENINGS?: YES

## 2024-04-01 SDOH — SOCIAL STABILITY: SOCIAL INSECURITY: DO YOU FEEL ANYONE HAS EXPLOITED OR TAKEN ADVANTAGE OF YOU FINANCIALLY OR OF YOUR PERSONAL PROPERTY?: NO

## 2024-04-01 ASSESSMENT — COGNITIVE AND FUNCTIONAL STATUS - GENERAL
HELP NEEDED FOR BATHING: A LITTLE
HELP NEEDED FOR BATHING: A LITTLE
MOVING TO AND FROM BED TO CHAIR: A LITTLE
TURNING FROM BACK TO SIDE WHILE IN FLAT BAD: A LITTLE
STANDING UP FROM CHAIR USING ARMS: A LITTLE
DAILY ACTIVITIY SCORE: 18
WALKING IN HOSPITAL ROOM: A LOT
PERSONAL GROOMING: A LITTLE
WALKING IN HOSPITAL ROOM: A LOT
DRESSING REGULAR UPPER BODY CLOTHING: A LITTLE
PERSONAL GROOMING: A LITTLE
DRESSING REGULAR LOWER BODY CLOTHING: A LOT
DRESSING REGULAR UPPER BODY CLOTHING: A LITTLE
CLIMB 3 TO 5 STEPS WITH RAILING: A LOT
DAILY ACTIVITIY SCORE: 18
MOVING FROM LYING ON BACK TO SITTING ON SIDE OF FLAT BED WITH BEDRAILS: A LITTLE
CLIMB 3 TO 5 STEPS WITH RAILING: A LOT
TURNING FROM BACK TO SIDE WHILE IN FLAT BAD: A LITTLE
MOBILITY SCORE: 16
PATIENT BASELINE BEDBOUND: NO
TOILETING: A LITTLE
MOVING TO AND FROM BED TO CHAIR: A LITTLE
MOVING FROM LYING ON BACK TO SITTING ON SIDE OF FLAT BED WITH BEDRAILS: A LITTLE
DRESSING REGULAR LOWER BODY CLOTHING: A LOT
TOILETING: A LITTLE

## 2024-04-01 ASSESSMENT — PAIN SCALES - GENERAL
PAINLEVEL_OUTOF10: 3
PAINLEVEL_OUTOF10: 5 - MODERATE PAIN
PAINLEVEL_OUTOF10: 8
PAINLEVEL_OUTOF10: 6
PAINLEVEL_OUTOF10: 8
PAINLEVEL_OUTOF10: 6
PAINLEVEL_OUTOF10: 7

## 2024-04-01 ASSESSMENT — ACTIVITIES OF DAILY LIVING (ADL)
HEARING - LEFT EAR: FUNCTIONAL
JUDGMENT_ADEQUATE_SAFELY_COMPLETE_DAILY_ACTIVITIES: YES
DRESSING YOURSELF: NEEDS ASSISTANCE
WALKS IN HOME: NEEDS ASSISTANCE
TOILETING: NEEDS ASSISTANCE
LACK_OF_TRANSPORTATION: NO
PATIENT'S MEMORY ADEQUATE TO SAFELY COMPLETE DAILY ACTIVITIES?: YES
GROOMING: INDEPENDENT
HEARING - RIGHT EAR: FUNCTIONAL
ADEQUATE_TO_COMPLETE_ADL: YES
FEEDING YOURSELF: INDEPENDENT
BATHING: NEEDS ASSISTANCE
ASSISTIVE_DEVICE: CANE;EYEGLASSES;WALKER

## 2024-04-01 ASSESSMENT — PAIN DESCRIPTION - LOCATION
LOCATION: KNEE

## 2024-04-01 ASSESSMENT — PAIN DESCRIPTION - ORIENTATION
ORIENTATION: RIGHT

## 2024-04-01 ASSESSMENT — PAIN - FUNCTIONAL ASSESSMENT
PAIN_FUNCTIONAL_ASSESSMENT: 0-10

## 2024-04-01 ASSESSMENT — PATIENT HEALTH QUESTIONNAIRE - PHQ9
2. FEELING DOWN, DEPRESSED OR HOPELESS: NOT AT ALL
SUM OF ALL RESPONSES TO PHQ9 QUESTIONS 1 & 2: 0
1. LITTLE INTEREST OR PLEASURE IN DOING THINGS: NOT AT ALL

## 2024-04-01 ASSESSMENT — LIFESTYLE VARIABLES
HOW MANY STANDARD DRINKS CONTAINING ALCOHOL DO YOU HAVE ON A TYPICAL DAY: PATIENT DOES NOT DRINK
AUDIT-C TOTAL SCORE: 0
HOW OFTEN DO YOU HAVE 6 OR MORE DRINKS ON ONE OCCASION: NEVER
HOW OFTEN DO YOU HAVE A DRINK CONTAINING ALCOHOL: NEVER
AUDIT-C TOTAL SCORE: 0
SKIP TO QUESTIONS 9-10: 1

## 2024-04-01 ASSESSMENT — COLUMBIA-SUICIDE SEVERITY RATING SCALE - C-SSRS
2. HAVE YOU ACTUALLY HAD ANY THOUGHTS OF KILLING YOURSELF?: NO
1. IN THE PAST MONTH, HAVE YOU WISHED YOU WERE DEAD OR WISHED YOU COULD GO TO SLEEP AND NOT WAKE UP?: NO
6. HAVE YOU EVER DONE ANYTHING, STARTED TO DO ANYTHING, OR PREPARED TO DO ANYTHING TO END YOUR LIFE?: NO

## 2024-04-01 NOTE — OP NOTE
"Open Total Knee Arthroplasty (R) Operative Note     Date: 2024  OR Location: [facility]    Name: Qamar Mckinney \"Toni\", : 1940, Age: 83 y.o., MRN: [id number], Sex: male    Diagnosis  Pre-op Diagnosis     * Primary osteoarthritis of right knee [M17.11] Post-op Diagnosis     * Primary osteoarthritis of right knee [M17.11]     Procedures  Open Total Knee Arthroplasty  53524 - ND ARTHRP KNE CONDYLE&PLATU MEDIAL&LAT COMPARTMENTS      Surgeons      * Yonis Silva - Primary    Resident/Fellow/Other Assistant:  Surgeon(s) and Role:     * Norma Santana - Assisting    Procedure Summary  Anesthesia: General  ASA: III  Anesthesia Staff: Anesthesiologist: Femi Jose DO  CRNA: AMRIK Minor-CRNA  C-AA: PHILLIP Thorne  Anesthesia Break User: PHILLIP Brown  Estimated Blood Loss: 50 mL  Intra-op Medications:   Administrations occurring from 1030 to 1330 on 24:   Medication Name Total Dose   vancomycin (Vancocin) vial for injection 2 g   lactated Ringer's infusion Cannot be calculated   ceFAZolin in dextrose (iso-os) (Ancef) IVPB 2 g 2 g   midazolam PF (Versed) injection 2 mg 2 mg              Anesthesia Record               Intraprocedure I/O Totals          Intake    Tranexamic Acid 0.00 mL    The total shown is the total volume documented since Anesthesia Start was filed.    lactated Ringer's infusion 1000.00 mL    ceFAZolin in dextrose (iso-os) (Ancef) IVPB 2 g 100.00 mL    Total Intake 1100 mL          Specimen: No specimens collected     Staff:   Circulator: Yonis Ferrari RN  Relief Circulator: Will Rebolledo RN  Relief Scrub: Shirin Mckeon RN  Scrub Person: Jessie Hernadez         Drains and/or Catheters: * None in log *    Tourniquet Times:     Total Tourniquet Time Documented:  area (laterality) - 87 minutes  Total: area (laterality) - 87 minutes      Implants:  Implants       Type Name Action Serial No.      Implant CEMENT, BONE, SIMPLEX HV FULL DOSE  40 " GM - [id number] Implanted      Implant CEMENT, BONE, SIMPLEX HV FULL DOSE  40 GM - [id number] Implanted      Joint PATELLA, TRIATHLON, ASYMMETRIC X3, SZ-A38 11MM - [id number] Implanted      Joint BASEPLATE, TIBIA 7 TS TRIATH - [id number] Implanted      Joint PEG, FEMORAL DISTAL FIXATION - [id number] Implanted      Joint FEM COMP, TRIATH SHIRA PS P7 RIGHT - [id number] Implanted      Joint INSERT, TIBIAL, X3 TRIATHLON PS, SZ-7 9MM - SSEE LOT - [id number] Implanted SEE LOT              Findings: See op note    Indications: Toni Mckinney is an 83 y.o. male who is having surgery for RIGHT KNEE OSTEOARTHITIS.  This is a patient who has severe arthritis in the above knee. The patient had difficulty with daily activities and had failed all conservative management. Options were discussed.  The patient desired total knee arthroplasty understanding the risks to include, but are not be limited to: loss of life, loss of limb, need for further surgery, nonunion, malunion, infection, nerve and vessel injury, leg length discrepancy, stiffness, and thromboembolic complication. Informed consent was obtained.    The patient was seen in the preoperative area. The risks, benefits, complications, treatment options, non-operative alternatives, expected recovery and outcomes were discussed with the patient. The possibilities of reaction to medication, pulmonary aspiration, injury to surrounding structures, bleeding, recurrent infection, the need for additional procedures, failure to diagnose a condition, and creating a complication requiring transfusion or operation were discussed with the patient. The patient concurred with the proposed plan, giving informed consent.  The site of surgery was properly noted/marked if necessary per policy. The patient has been actively warmed in preoperative area. Preoperative antibiotics have been ordered and given within 1 hours of incision. Venous thrombosis prophylaxis have been ordered including unilateral  sequential compression device    Procedure Details: DESCRIPTION OF PROCEDURE: The patient was transferred to the operative suite after appropriate preoperative preparation and site marking. Preoperative intravenous antibiotics and tranexamic acid were administered as indicated. Anesthesia was induced.  The knee was prepared in the usual sterile fashion, draped in the usual sterile fashion and incision marked. The tourniquet was inflated to appropriate pressure. Incision was made in the midline. Medial parapatellar approach was utilized. The fat pad was removed, patella was everted, and gentle medial dissection was performed along the proximal tibia. Eburnated bone was noted in more than one compartment. The knee was flexed and the femoral starting point was identified medial to Theodore´s line, anterior to the PCL insertion, and the intramedullary guide was utilized to cut the femur in 5 degrees valgus. Next, the knee was maximally flexed and the posterior cruciate retractor was utilized to retract the tibia forward and the remaining menisci were removed. The intra medullary tibial guide was placed in line with the tibia and was used to cut the tibia at the appropriate depth and slope based on preoperative planning. Soft tissues were protected throughout the cuts.  The bone fragments were removed and tibial osteophytes were removed.  We then placed our size 9 millimeter spacer block in extension which had good varus and valgus stability.  Neck the femoral sizing guide was utilized and showed the listed size above was appropriate size. The finishing cutting guide was then utilized with the axis set at 3 degrees external based on the epicondylar axis. The finishing cuts were made with the soft tissue protected, bone fragments were removed, and remaining femoral osteophytes were removed.  The baseplate tibial trial was then placed in appropriate rotation with the guide just at the medial one third of the tibial  tubercle and the punch and peg were used. posterior osteophytes were removed in their entirety along with any other posterior debris.  The trial was placed and femur prepared for the component. Trialing was performed, and after appropriate soft tissue balancing showed there was excellent equality of flexion/extension gaps and excellent varus/valgus stability throughout a range of motion with the above size spacer. The patella was then treated as indicated above and instrumented based on the amount of patellar wear. Next, the patellar tracking was seen to be excellent with no thumbs.  A lateral release was not necessary.  Next, thorough irrigation was performed and the final implants were cemented into place. The cement was allowed to cure and excess cement was removed. The final spacer was placed. This snapped in quite nicely and, again, motions were excellent and stability was excellent throughout a range of motion from 0 to 125 degrees. Thorough irrigation was performed and the wound was closed using #1 Vicryl sutures in the parapatellar approach along with #1 Stratafix, 2-0 monocryl sutures in the subcutaneous skin, and 3-0 V-Loc used in the cutaneous skin. Prineo dressing with Derma arias was used for wound sealant and sterile dressings were applied when the Dermabond was dry. The patient was awakened and transferred to recovery room in stable condition.     PLAN: The patient will be weightbearing as tolerated on the operative lower extremity.  He will be placed back on his apixaban for DVT prophylaxis.  Standard postoperative knee replacement protocols will be followed.  Complications:  None; patient tolerated the procedure well.    Disposition: PACU - hemodynamically stable.  Condition: stable         Additional Details: None    Attending Attestation: I performed the procedure.    Yonis Silva  Phone Number: [phone]

## 2024-04-01 NOTE — ANESTHESIA PROCEDURE NOTES
Airway  Date/Time: 4/1/2024 11:25 AM  Urgency: elective    Airway not difficult    Staffing  Performed: PHILLIP   Authorized by: Femi Jose DO    Performed by: PHILLIP Thorne  Patient location during procedure: OR    Indications and Patient Condition  Indications for airway management: anesthesia  Spontaneous Ventilation: absent  Sedation level: deep  Preoxygenated: yes  Patient position: sniffing  MILS not maintained throughout  Mask difficulty assessment: 0 - not attempted    Final Airway Details  Final airway type: supraglottic airway      Successful airway: classic  Size 4     Number of attempts at approach: 1

## 2024-04-01 NOTE — ANESTHESIA PROCEDURE NOTES
Peripheral Block    Start time: 4/1/2024 12:10 PM  End time: 4/1/2024 12:20 PM  Reason for block: at surgeon's request and post-op pain management  Staffing  Performed: attending   Authorized by: Femi Jose DO    Performed by: Femi Jose DO  Preanesthetic Checklist  Completed: patient identified, IV checked, site marked, risks and benefits discussed, surgical consent, monitors and equipment checked, pre-op evaluation and timeout performed   Timeout performed at:   Peripheral Block  Patient position: laying flat  Prep: Betadine  Patient monitoring: heart rate, cardiac monitor and continuous pulse ox  Block type: adductor canal  Laterality: right  Injection technique: single-shot  Guidance: ultrasound guided  Needle  Needle gauge: 22 G  Needle localization: ultrasound guidance  Assessment  Heart rate change: no

## 2024-04-01 NOTE — NURSING NOTE
PCNA made me aware of /107 with a HR >130. ECG was done and showed AFIB RVR (printed and placed in chart). Patient alert and oriented and asymptomatic other than abnormal vitals. No PRN medications on board. Hospitalist was paged and promptly came to assess the patient. Patient was put on telemetry and new orders were received. No additional needs at this time. Plan of care ongoing.

## 2024-04-01 NOTE — NURSING NOTE
Patient arrived to unit at this time from PACU. Alert and oriented, vitals stable, pain tolerable. Call light and personal belongings within reach. Admission in progress, plan of care ongoing. No additional needs at this time.

## 2024-04-01 NOTE — ANESTHESIA PREPROCEDURE EVALUATION
"Patient: Qamar Mckinney \"Toni\"    Procedure Information       Date/Time: 04/01/24 1030    Procedure: Open Total Knee Arthroplasty (Right: Knee) - [facility]    Location: [facility] / [facility]    Surgeons: Yonis Silva MD            Relevant Problems   Anesthesia (within normal limits)      Cardiac   (+) Benign essential hypertension   (+) Chronic atrial fibrillation (CMS/HCC)   (+) Congestive heart failure (CMS/HCC)   (+) Hyperlipidemia, unspecified   (+) Pure hypercholesterolemia      Neuro   (+) Stroke (CMS/HCC)      /Renal   (+) Benign prostatic hyperplasia without urinary obstruction      Musculoskeletal   (+) DDD (degenerative disc disease), lumbar   (+) Degenerative joint disease   (+) Osteoarthritis of both knees   (+) Unilateral primary osteoarthritis, right knee      HEENT   (+) Sensorineural hearing loss (SNHL) of both ears      ID   (+) Onychomycosis      Skin   (+) Eczema       Clinical information reviewed:   Tobacco  Allergies  Meds  Problems  Med Hx  Surg Hx   Fam Hx  Soc   Hx        NPO Detail:  NPO/Void Status  Date of Last Liquid: 03/31/24  Time of Last Liquid: 2200  Date of Last Solid: 03/31/24  Time of Last Solid: 2200         Physical Exam    Airway  Mallampati: II  TM distance: >3 FB     Cardiovascular    Dental    Pulmonary    Abdominal            Anesthesia Plan    History of general anesthesia?: yes  History of complications of general anesthesia?: no    ASA 3     general and regional     intravenous induction   Anesthetic plan and risks discussed with patient.      "

## 2024-04-01 NOTE — ADDENDUM NOTE
Addendum  created 04/01/24 1429 by Femi Jose, DO    Child order released for a procedure order, Clinical Note Signed, Intraprocedure Blocks edited, SmartForm saved

## 2024-04-01 NOTE — CONSULTS
Inpatient consult to Medicine  Consult performed by: Candida Bunch, AMRIK-CNP  Consult ordered by: Yonis Silva MD  Reason for consult: Postoperative management of hypertension, BPH, chronic atrial fibrillation          Reason For Consult      History Of Present Illness  Toni Mckinney is a 83 y.o. male presenting to [facility] for elective RT TKA by Dr Silva.  Consulted for postoperative management of hypertension, hyperlipidemia chronic atrial fibrillation.     Past Medical History  He has a past medical history of Benign essential hypertension (04/04/2022), Benign prostatic hyperplasia without urinary obstruction (12/01/2023), Bilateral inguinal hernia without obstruction or gangrene (09/13/2023), CHF (congestive heart failure) (CMS/[facility]), Chronic atrial fibrillation (CMS/HCC) (09/13/2023), Congestive heart failure (CMS/HCC) (12/01/2023), DDD (degenerative disc disease), lumbar, Degenerative joint disease (09/13/2023), Eczema, Essential tremor, History of polymyalgia rheumatica (03/20/2024), History of TIA (transient ischemic attack) (09/13/2023), IFG (impaired fasting glucose), Onychomycosis, Osteoarthritis, Osteoarthritis of both knees (03/18/2021), Other conditions influencing health status, Polymyalgia rheumatica (CMS/HCC), Spondylosis without myelopathy or radiculopathy, lumbosacral region, and Unspecified abdominal hernia without obstruction or gangrene.    Surgical History  He has a past surgical history that includes Other surgical history and Cataract extraction (Left).     Social History  He reports that he has never smoked. He has never been exposed to tobacco smoke. He has never used smokeless tobacco. He reports current alcohol use of about 2.0 - 3.0 standard drinks of alcohol per week. He reports that he does not use drugs.    Family History  Family History   Problem Relation Name Age of Onset    No Known Problems Mother      Colon cancer Father           Allergies  Furosemide    Review of Systems   All other systems reviewed and are negative.       Physical Exam  Constitutional:       General: He is not in acute distress.     Appearance: He is not ill-appearing or toxic-appearing.   Cardiovascular:      Rate and Rhythm: Tachycardia present. Rhythm irregular.      Pulses: Normal pulses.      Heart sounds: Normal heart sounds.   Pulmonary:      Effort: Pulmonary effort is normal.      Breath sounds: Normal breath sounds.   Abdominal:      General: Bowel sounds are normal.      Palpations: Abdomen is soft.   Skin:     General: Skin is warm and dry.      Capillary Refill: Capillary refill takes 2 to 3 seconds.      Comments: Drsg to RLE dry and intact    Neurological:      General: No focal deficit present.      Mental Status: He is alert and oriented to person, place, and time.   Psychiatric:         Mood and Affect: Mood normal.         Behavior: Behavior normal.          Last Recorded Vitals  BP (!) 146/98 (BP Location: Right arm, Patient Position: Lying)   Pulse 104   Temp 37 °C (98.6 °F) (Tympanic)   Resp 20   Wt 93 kg (205 lb)   SpO2 99%     Relevant Results  Scheduled medications  [START ON 4/2/2024] apixaban, 5 mg, oral, BID  atorvastatin, 10 mg, oral, Daily  ceFAZolin, 2 g, intravenous, q8h  metoprolol tartrate, 25 mg, oral, BID  polyethylene glycol, 17 g, oral, Daily      Continuous medications  lactated Ringer's, 100 mL/hr  oxygen, 2 L/min  sodium chloride 0.9%, 100 mL/hr      PRN medications  PRN medications: acetaminophen, HYDROcodone-acetaminophen, HYDROcodone-acetaminophen, naloxone, naloxone, naloxone, ondansetron ODT **OR** ondansetron          Assessment/Plan     RT TKA  -pain management  -PT, OT    HTN  -continue BB with parameters  -monitor blood pressure    Chronic Afib   -telemetry  -rate control  -continue BB  -continue Eliquis    Hyperlipidemia  -Continue statin    Plan   Above plan discussed with pt and he is in agreement  Thank you for  this consultation, will follow along with you    Candida Bunch, AMRIK-CNP

## 2024-04-01 NOTE — ANESTHESIA POSTPROCEDURE EVALUATION
"Patient: Qamar Mckinney \"Toni\"    Procedure Summary       Date: 04/01/24 Room / Location: [facility] / [facility]    Anesthesia Start: 1116 Anesthesia Stop: 1402    Procedure: Open Total Knee Arthroplasty (Right: Knee) Diagnosis:       Primary osteoarthritis of right knee      (RIGHT KNEE OSTEOARTHITIS)    Surgeons: Yonis Silva MD Responsible Provider: Femi Jose DO    Anesthesia Type: general, regional ASA Status: 3            Anesthesia Type: general, regional    Vitals Value Taken Time   /92 04/01/24 1406   Temp 36.4 °C (97.5 °F) 04/01/24 1355   Pulse 106 04/01/24 1408   Resp 15 04/01/24 1408   SpO2 100 % 04/01/24 1408   Vitals shown include unvalidated device data.    Anesthesia Post Evaluation    Patient location during evaluation: bedside  Patient participation: complete - patient participated  Level of consciousness: awake  Pain management: adequate  Airway patency: patent  Cardiovascular status: acceptable  Respiratory status: acceptable  Hydration status: acceptable  Postoperative Nausea and Vomiting: none        There were no known notable events for this encounter.    "

## 2024-04-01 NOTE — CARE PLAN
The patient's goals for the shift include      The clinical goals for the shift include manage pain, remain free from falls      Problem: Pain  Goal: My pain/discomfort is manageable  Outcome: Progressing     Problem: Safety  Goal: Patient will be injury free during hospitalization  Outcome: Progressing  Goal: I will remain free of falls  Outcome: Progressing     Problem: Daily Care  Goal: Daily care needs are met  Outcome: Progressing     Problem: Psychosocial Needs  Goal: Demonstrates ability to cope with hospitalization/illness  Outcome: Progressing  Goal: Collaborate with me, my family, and caregiver to identify my specific goals  Outcome: Progressing     Problem: Fall/Injury  Goal: Not fall by end of shift  Outcome: Progressing  Goal: Be free from injury by end of the shift  Outcome: Progressing  Goal: Verbalize understanding of personal risk factors for fall in the hospital  Outcome: Progressing  Goal: Verbalize understanding of risk factor reduction measures to prevent injury from fall in the home  Outcome: Progressing  Goal: Use assistive devices by end of the shift  Outcome: Progressing  Goal: Pace activities to prevent fatigue by end of the shift  Outcome: Progressing

## 2024-04-01 NOTE — CARE PLAN
The patient's goals for the shift include  manage pain, rest.     The clinical goals for the shift include Manage pain, ambulate, monitor labs and VS, safety, no falls, promote rest.    No anticipated barriers to meeting these goals.       Problem: Pain  Goal: My pain/discomfort is manageable  Outcome: Progressing     Problem: Safety  Goal: Patient will be injury free during hospitalization  Outcome: Progressing  Goal: I will remain free of falls  Outcome: Progressing     Problem: Daily Care  Goal: Daily care needs are met  Outcome: Progressing     Problem: Psychosocial Needs  Goal: Demonstrates ability to cope with hospitalization/illness  Outcome: Progressing  Goal: Collaborate with me, my family, and caregiver to identify my specific goals  Outcome: Progressing  Flowsheets (Taken 4/1/2024 3531)  Cultural Requests During Hospitalization: None  Spiritual Requests During Hospitalization: None     Problem: Discharge Barriers  Goal: My discharge needs are met  Outcome: Progressing     Problem: Fall/Injury  Goal: Not fall by end of shift  Outcome: Progressing  Goal: Be free from injury by end of the shift  Outcome: Progressing  Goal: Verbalize understanding of personal risk factors for fall in the hospital  Outcome: Progressing  Goal: Verbalize understanding of risk factor reduction measures to prevent injury from fall in the home  Outcome: Progressing  Goal: Use assistive devices by end of the shift  Outcome: Progressing  Goal: Pace activities to prevent fatigue by end of the shift  Outcome: Progressing     Problem: Pain  Goal: Takes deep breaths with improved pain control throughout the shift  Outcome: Progressing  Goal: Turns in bed with improved pain control throughout the shift  Outcome: Progressing  Goal: Walks with improved pain control throughout the shift  Outcome: Progressing  Goal: Performs ADL's with improved pain control throughout shift  Outcome: Progressing  Goal: Participates in PT with improved pain  control throughout the shift  Outcome: Progressing  Goal: Free from opioid side effects throughout the shift  Outcome: Progressing  Goal: Free from acute confusion related to pain meds throughout the shift  Outcome: Progressing

## 2024-04-01 NOTE — SIGNIFICANT EVENT
Came to see patient as nurse reported heart rate elevated 120-130/min  Patient has no chest pain shortness of breath palpitations he wants to have a bowel movement.  He has history of chronic afibrillation on Eliquis he just had knee surgery.  Chest clear  Heart regular heart rate about 120  Abdomen soft  Extremities no edema  Neurologic exam nonfocal    Impression is for A-fib RVR this is chronic A-fib will add on as needed Cardizem and will monitor discussed with nurse

## 2024-04-02 ENCOUNTER — APPOINTMENT (OUTPATIENT)
Dept: CARDIOLOGY | Facility: HOSPITAL | Age: 84
DRG: 470 | End: 2024-04-02
Payer: MEDICARE

## 2024-04-02 LAB
ANION GAP SERPL CALC-SCNC: 13 MMOL/L
APPEARANCE UR: CLEAR
BILIRUB UR STRIP.AUTO-MCNC: NEGATIVE MG/DL
BUN SERPL-MCNC: 16 MG/DL (ref 8–25)
CALCIUM SERPL-MCNC: 8.8 MG/DL (ref 8.5–10.4)
CHLORIDE SERPL-SCNC: 101 MMOL/L (ref 97–107)
CO2 SERPL-SCNC: 24 MMOL/L (ref 24–31)
COLOR UR: COLORLESS
CREAT SERPL-MCNC: 1 MG/DL (ref 0.4–1.6)
EGFRCR SERPLBLD CKD-EPI 2021: 75 ML/MIN/1.73M*2
ERYTHROCYTE [DISTWIDTH] IN BLOOD BY AUTOMATED COUNT: 12.9 % (ref 11.5–14.5)
GLUCOSE SERPL-MCNC: 166 MG/DL (ref 65–99)
GLUCOSE UR STRIP.AUTO-MCNC: ABNORMAL MG/DL
HCT VFR BLD AUTO: 38.4 % (ref 41–52)
HGB BLD-MCNC: 12.5 G/DL (ref 13.5–17.5)
KETONES UR STRIP.AUTO-MCNC: ABNORMAL MG/DL
LEUKOCYTE ESTERASE UR QL STRIP.AUTO: NEGATIVE
MCH RBC QN AUTO: 29.4 PG (ref 26–34)
MCHC RBC AUTO-ENTMCNC: 32.6 G/DL (ref 32–36)
MCV RBC AUTO: 90 FL (ref 80–100)
MUCOUS THREADS #/AREA URNS AUTO: ABNORMAL /LPF
NITRITE UR QL STRIP.AUTO: NEGATIVE
NRBC BLD-RTO: 0 /100 WBCS (ref 0–0)
PH UR STRIP.AUTO: 6 [PH]
PLATELET # BLD AUTO: 225 X10*3/UL (ref 150–450)
POTASSIUM SERPL-SCNC: 4 MMOL/L (ref 3.4–5.1)
PROT UR STRIP.AUTO-MCNC: NEGATIVE MG/DL
RBC # BLD AUTO: 4.25 X10*6/UL (ref 4.5–5.9)
RBC # UR STRIP.AUTO: ABNORMAL /UL
RBC #/AREA URNS AUTO: >20 /HPF
SODIUM SERPL-SCNC: 138 MMOL/L (ref 133–145)
SP GR UR STRIP.AUTO: 1.01
UROBILINOGEN UR STRIP.AUTO-MCNC: NORMAL MG/DL
WBC # BLD AUTO: 12.3 X10*3/UL (ref 4.4–11.3)
WBC #/AREA URNS AUTO: ABNORMAL /HPF

## 2024-04-02 PROCEDURE — 81001 URINALYSIS AUTO W/SCOPE: CPT | Performed by: INTERNAL MEDICINE

## 2024-04-02 PROCEDURE — 97165 OT EVAL LOW COMPLEX 30 MIN: CPT | Mod: GO

## 2024-04-02 PROCEDURE — 85027 COMPLETE CBC AUTOMATED: CPT | Performed by: STUDENT IN AN ORGANIZED HEALTH CARE EDUCATION/TRAINING PROGRAM

## 2024-04-02 PROCEDURE — 2500000001 HC RX 250 WO HCPCS SELF ADMINISTERED DRUGS (ALT 637 FOR MEDICARE OP): Performed by: STUDENT IN AN ORGANIZED HEALTH CARE EDUCATION/TRAINING PROGRAM

## 2024-04-02 PROCEDURE — 97161 PT EVAL LOW COMPLEX 20 MIN: CPT | Mod: GP

## 2024-04-02 PROCEDURE — 2500000004 HC RX 250 GENERAL PHARMACY W/ HCPCS (ALT 636 FOR OP/ED): Performed by: STUDENT IN AN ORGANIZED HEALTH CARE EDUCATION/TRAINING PROGRAM

## 2024-04-02 PROCEDURE — 7100000011 HC EXTENDED STAY RECOVERY HOURLY - NURSING UNIT

## 2024-04-02 PROCEDURE — 2500000002 HC RX 250 W HCPCS SELF ADMINISTERED DRUGS (ALT 637 FOR MEDICARE OP, ALT 636 FOR OP/ED): Performed by: UROLOGY

## 2024-04-02 PROCEDURE — 2500000004 HC RX 250 GENERAL PHARMACY W/ HCPCS (ALT 636 FOR OP/ED): Performed by: INTERNAL MEDICINE

## 2024-04-02 PROCEDURE — 99221 1ST HOSP IP/OBS SF/LOW 40: CPT | Performed by: INTERNAL MEDICINE

## 2024-04-02 PROCEDURE — 1200000002 HC GENERAL ROOM WITH TELEMETRY DAILY

## 2024-04-02 PROCEDURE — 97116 GAIT TRAINING THERAPY: CPT | Mod: GP

## 2024-04-02 PROCEDURE — 36415 COLL VENOUS BLD VENIPUNCTURE: CPT | Performed by: STUDENT IN AN ORGANIZED HEALTH CARE EDUCATION/TRAINING PROGRAM

## 2024-04-02 PROCEDURE — 87086 URINE CULTURE/COLONY COUNT: CPT | Mod: TRILAB,WESLAB | Performed by: INTERNAL MEDICINE

## 2024-04-02 PROCEDURE — 97110 THERAPEUTIC EXERCISES: CPT | Mod: GP,CQ

## 2024-04-02 PROCEDURE — 97530 THERAPEUTIC ACTIVITIES: CPT | Mod: GO

## 2024-04-02 PROCEDURE — 93005 ELECTROCARDIOGRAM TRACING: CPT

## 2024-04-02 PROCEDURE — 80048 BASIC METABOLIC PNL TOTAL CA: CPT | Performed by: NURSE PRACTITIONER

## 2024-04-02 PROCEDURE — 2500000001 HC RX 250 WO HCPCS SELF ADMINISTERED DRUGS (ALT 637 FOR MEDICARE OP): Performed by: NURSE PRACTITIONER

## 2024-04-02 RX ORDER — TAMSULOSIN HYDROCHLORIDE 0.4 MG/1
0.4 CAPSULE ORAL DAILY
Status: DISCONTINUED | OUTPATIENT
Start: 2024-04-02 | End: 2024-04-05 | Stop reason: HOSPADM

## 2024-04-02 RX ORDER — DIGOXIN 0.25 MG/ML
250 INJECTION INTRAMUSCULAR; INTRAVENOUS ONCE
Status: COMPLETED | OUTPATIENT
Start: 2024-04-02 | End: 2024-04-02

## 2024-04-02 RX ORDER — DIGOXIN 125 MCG
125 TABLET ORAL DAILY
Status: DISCONTINUED | OUTPATIENT
Start: 2024-04-03 | End: 2024-04-05 | Stop reason: HOSPADM

## 2024-04-02 RX ORDER — DIGOXIN 0.25 MG/ML
500 INJECTION INTRAMUSCULAR; INTRAVENOUS ONCE
Status: COMPLETED | OUTPATIENT
Start: 2024-04-02 | End: 2024-04-02

## 2024-04-02 RX ORDER — HYDROCODONE BITARTRATE AND ACETAMINOPHEN 5; 325 MG/1; MG/1
2 TABLET ORAL EVERY 6 HOURS PRN
Qty: 40 TABLET | Refills: 0 | Status: SHIPPED | OUTPATIENT
Start: 2024-04-02

## 2024-04-02 RX ADMIN — CEFAZOLIN SODIUM 2 G: 2 INJECTION, SOLUTION INTRAVENOUS at 04:20

## 2024-04-02 RX ADMIN — APIXABAN 5 MG: 5 TABLET, FILM COATED ORAL at 09:57

## 2024-04-02 RX ADMIN — SODIUM CHLORIDE 100 ML/HR: 900 INJECTION, SOLUTION INTRAVENOUS at 03:14

## 2024-04-02 RX ADMIN — APIXABAN 5 MG: 5 TABLET, FILM COATED ORAL at 20:26

## 2024-04-02 RX ADMIN — ATORVASTATIN CALCIUM 10 MG: 10 TABLET, FILM COATED ORAL at 09:57

## 2024-04-02 RX ADMIN — SODIUM CHLORIDE 100 ML/HR: 900 INJECTION, SOLUTION INTRAVENOUS at 12:00

## 2024-04-02 RX ADMIN — TAMSULOSIN HYDROCHLORIDE 0.4 MG: 0.4 CAPSULE ORAL at 16:31

## 2024-04-02 RX ADMIN — DIGOXIN 250 MCG: 0.25 INJECTION INTRAMUSCULAR; INTRAVENOUS at 16:31

## 2024-04-02 RX ADMIN — CEFAZOLIN SODIUM 2 G: 2 INJECTION, SOLUTION INTRAVENOUS at 11:40

## 2024-04-02 RX ADMIN — HYDROCODONE BITARTRATE AND ACETAMINOPHEN 1 TABLET: 5; 325 TABLET ORAL at 09:56

## 2024-04-02 RX ADMIN — METOPROLOL TARTRATE 25 MG: 25 TABLET, FILM COATED ORAL at 09:56

## 2024-04-02 RX ADMIN — POLYETHYLENE GLYCOL 3350 17 G: 17 POWDER, FOR SOLUTION ORAL at 09:57

## 2024-04-02 RX ADMIN — HYDROCODONE BITARTRATE AND ACETAMINOPHEN 1 TABLET: 5; 325 TABLET ORAL at 00:31

## 2024-04-02 RX ADMIN — SODIUM CHLORIDE 100 ML/HR: 900 INJECTION, SOLUTION INTRAVENOUS at 02:47

## 2024-04-02 RX ADMIN — HYDROCODONE BITARTRATE AND ACETAMINOPHEN 1 TABLET: 5; 325 TABLET ORAL at 23:37

## 2024-04-02 RX ADMIN — DIGOXIN 500 MCG: 0.25 INJECTION INTRAMUSCULAR; INTRAVENOUS at 10:25

## 2024-04-02 RX ADMIN — METOPROLOL TARTRATE 25 MG: 25 TABLET, FILM COATED ORAL at 20:26

## 2024-04-02 RX ADMIN — HYDROCODONE BITARTRATE AND ACETAMINOPHEN 1 TABLET: 5; 325 TABLET ORAL at 04:21

## 2024-04-02 SDOH — ECONOMIC STABILITY: FOOD INSECURITY: WITHIN THE PAST 12 MONTHS, THE FOOD YOU BOUGHT JUST DIDN'T LAST AND YOU DIDN'T HAVE MONEY TO GET MORE.: NEVER TRUE

## 2024-04-02 SDOH — ECONOMIC STABILITY: FOOD INSECURITY: WITHIN THE PAST 12 MONTHS, YOU WORRIED THAT YOUR FOOD WOULD RUN OUT BEFORE YOU GOT MONEY TO BUY MORE.: NEVER TRUE

## 2024-04-02 ASSESSMENT — PAIN SCALES - GENERAL
PAINLEVEL_OUTOF10: 2
PAINLEVEL_OUTOF10: 1
PAINLEVEL_OUTOF10: 1
PAINLEVEL_OUTOF10: 4
PAINLEVEL_OUTOF10: 0 - NO PAIN
PAINLEVEL_OUTOF10: 4
PAINLEVEL_OUTOF10: 4
PAINLEVEL_OUTOF10: 3
PAINLEVEL_OUTOF10: 1
PAINLEVEL_OUTOF10: 9
PAINLEVEL_OUTOF10: 4
PAINLEVEL_OUTOF10: 4

## 2024-04-02 ASSESSMENT — COGNITIVE AND FUNCTIONAL STATUS - GENERAL
TURNING FROM BACK TO SIDE WHILE IN FLAT BAD: A LITTLE
TOILETING: A LITTLE
STANDING UP FROM CHAIR USING ARMS: A LITTLE
TURNING FROM BACK TO SIDE WHILE IN FLAT BAD: A LITTLE
WALKING IN HOSPITAL ROOM: A LITTLE
MOVING FROM LYING ON BACK TO SITTING ON SIDE OF FLAT BED WITH BEDRAILS: A LITTLE
WALKING IN HOSPITAL ROOM: A LITTLE
DAILY ACTIVITIY SCORE: 17
DRESSING REGULAR UPPER BODY CLOTHING: A LITTLE
MOVING TO AND FROM BED TO CHAIR: A LITTLE
DAILY ACTIVITIY SCORE: 18
MOVING FROM LYING ON BACK TO SITTING ON SIDE OF FLAT BED WITH BEDRAILS: A LITTLE
MOVING FROM LYING ON BACK TO SITTING ON SIDE OF FLAT BED WITH BEDRAILS: A LITTLE
MOBILITY SCORE: 15
MOVING TO AND FROM BED TO CHAIR: A LITTLE
MOBILITY SCORE: 17
STANDING UP FROM CHAIR USING ARMS: A LITTLE
STANDING UP FROM CHAIR USING ARMS: A LITTLE
TOILETING: A LITTLE
CLIMB 3 TO 5 STEPS WITH RAILING: TOTAL
DRESSING REGULAR UPPER BODY CLOTHING: A LITTLE
DRESSING REGULAR UPPER BODY CLOTHING: A LITTLE
CLIMB 3 TO 5 STEPS WITH RAILING: A LOT
DRESSING REGULAR LOWER BODY CLOTHING: A LOT
MOVING FROM LYING ON BACK TO SITTING ON SIDE OF FLAT BED WITH BEDRAILS: A LITTLE
PERSONAL GROOMING: A LITTLE
WALKING IN HOSPITAL ROOM: A LOT
CLIMB 3 TO 5 STEPS WITH RAILING: A LOT
MOVING TO AND FROM BED TO CHAIR: A LITTLE
MOVING TO AND FROM BED TO CHAIR: A LITTLE
TOILETING: A LITTLE
STANDING UP FROM CHAIR USING ARMS: A LITTLE
TURNING FROM BACK TO SIDE WHILE IN FLAT BAD: A LITTLE
CLIMB 3 TO 5 STEPS WITH RAILING: A LOT
MOBILITY SCORE: 17
HELP NEEDED FOR BATHING: A LOT
PERSONAL GROOMING: A LITTLE
MOBILITY SCORE: 17
DRESSING REGULAR LOWER BODY CLOTHING: A LOT
DRESSING REGULAR LOWER BODY CLOTHING: A LOT
TURNING FROM BACK TO SIDE WHILE IN FLAT BAD: A LITTLE
DAILY ACTIVITIY SCORE: 17
WALKING IN HOSPITAL ROOM: A LITTLE
HELP NEEDED FOR BATHING: A LITTLE
PERSONAL GROOMING: A LITTLE
HELP NEEDED FOR BATHING: A LOT

## 2024-04-02 ASSESSMENT — PAIN DESCRIPTION - LOCATION
LOCATION: KNEE

## 2024-04-02 ASSESSMENT — PAIN - FUNCTIONAL ASSESSMENT
PAIN_FUNCTIONAL_ASSESSMENT: 0-10

## 2024-04-02 ASSESSMENT — PAIN DESCRIPTION - ORIENTATION
ORIENTATION: RIGHT

## 2024-04-02 ASSESSMENT — ACTIVITIES OF DAILY LIVING (ADL)
ADL_ASSISTANCE: INDEPENDENT
ADL_ASSISTANCE: INDEPENDENT
BATHING_ASSISTANCE: MODERATE

## 2024-04-02 NOTE — CARE PLAN
The patient's goals for the shift include  manage pain     The clinical goals for the shift include manage pain, remain free from falls, monitor urine output.       Problem: Pain  Goal: My pain/discomfort is manageable  Outcome: Progressing     Problem: Safety  Goal: Patient will be injury free during hospitalization  Outcome: Progressing  Goal: I will remain free of falls  Outcome: Progressing     Problem: Daily Care  Goal: Daily care needs are met  Outcome: Progressing     Problem: Psychosocial Needs  Goal: Demonstrates ability to cope with hospitalization/illness  Outcome: Progressing  Goal: Collaborate with me, my family, and caregiver to identify my specific goals  Outcome: Progressing     Problem: Discharge Barriers  Goal: My discharge needs are met  Outcome: Progressing     Problem: Fall/Injury  Goal: Not fall by end of shift  Outcome: Progressing  Goal: Be free from injury by end of the shift  Outcome: Progressing  Goal: Verbalize understanding of personal risk factors for fall in the hospital  Outcome: Progressing  Goal: Verbalize understanding of risk factor reduction measures to prevent injury from fall in the home  Outcome: Progressing  Goal: Use assistive devices by end of the shift  Outcome: Progressing  Goal: Pace activities to prevent fatigue by end of the shift  Outcome: Progressing     Problem: Pain  Goal: Takes deep breaths with improved pain control throughout the shift  Outcome: Progressing  Goal: Turns in bed with improved pain control throughout the shift  Outcome: Progressing  Goal: Walks with improved pain control throughout the shift  Outcome: Progressing  Goal: Performs ADL's with improved pain control throughout shift  Outcome: Progressing  Goal: Participates in PT with improved pain control throughout the shift  Outcome: Progressing  Goal: Free from opioid side effects throughout the shift  Outcome: Progressing  Goal: Free from acute confusion related to pain meds throughout the  shift  Outcome: Progressing

## 2024-04-02 NOTE — NURSING NOTE
Patient was assisted to [facility] with 2 person assist and a walker. Patient is not following instructions on use of a walker. Vitals were done while patient was sitting on BS, BP 88/56 with . Patient states that he started feeling dizzy during vital signs being taken.   Patient assisted back to bed with 2 person assist and a walker. Patient was following instructions a little bit better this time. BP rechecked 115/66 with HR 91.   Will continue to monitor.   Call light within reach.

## 2024-04-02 NOTE — CONSULTS
Inpatient consult to Cardiology  Consult performed by: Primitivo Gaming DO  Consult ordered by: AMRIK Forman-CNP  Reason for consult: Atrial fibrillation        History Of Present Illness:    Toni Mckinney is a 83 y.o. male presenting with elective total knee arthroplasty procedure.  The patient has a history of persistent atrial fibrillation and is on a rate control strategy with metoprolol and Eliquis for stroke prevention.  He follows with my partner Dr. Jcarlos Castaneda on a regular basis.  The patient underwent a total knee arthroplasty procedure yesterday and heart rates have been elevated prompting this consultation.  The patient really does not notice his heart rate beating more rapidly and overall states he is feeling relatively well with postoperative pain as expected.     Last Recorded Vitals:  Vitals:    04/01/24 2241 04/02/24 0432 04/02/24 0459 04/02/24 0734   BP: 129/69 88/56 115/66 113/82   BP Location: Right arm Right arm Right arm Right arm   Patient Position: Lying Sitting Lying Lying   Pulse: (!) 119 107 91 (!) 136   Resp: 17 18  20   Temp: 37 °C (98.6 °F) 36.9 °C (98.4 °F)  36.7 °C (98.1 °F)   TempSrc: Temporal Temporal  Temporal   SpO2: 98% 95% 97% 99%   Weight:       Height:           Last Labs:  CBC - 4/2/2024:  5:13 AM  12.3 12.5 225    38.4      CMP - 4/2/2024:  5:13 AM  8.8 6.6 16 --- 1.0   _ 4.1 13 82      PTT - No results in last year.  _   _ _     Hemoglobin A1C   Date/Time Value Ref Range Status   12/01/2023 04:45 PM 5.6 See below % Final   09/06/2023 10:12 AM 5.8 4.0 - 6.0 % Final     Comment:     Hemoglobin A1C levels are related to mean blood glucose during the   preceding 2-3 months. The relationship table below may be used as a   general guide. Each 1% increase in HGB A1C is a reflection of an   increase in mean glucose of approximately 30 mg/dl.   Reference: Diabetes Care, volume 29, supplement 1 Jan. 2006                        HGB A1C ................. Approx. Mean Glucose    "_______________________________________________   6%   ...............................  120 mg/dl   7%   ...............................  150 mg/dl   8%   ...............................  180 mg/dl   9%   ...............................  210 mg/dl   10%  ...............................  240 mg/dl  Performed at [address] [address]     05/30/2023 04:07 PM 5.7 4.0 - 6.0 % Final     Comment:     Hemoglobin A1C levels are related to mean blood glucose during the   preceding 2-3 months. The relationship table below may be used as a   general guide. Each 1% increase in HGB A1C is a reflection of an   increase in mean glucose of approximately 30 mg/dl.   Reference: Diabetes Care, volume 29, supplement 1 Jan. 2006                        HGB A1C ................. Approx. Mean Glucose   _______________________________________________   6%   ...............................  120 mg/dl   7%   ...............................  150 mg/dl   8%   ...............................  180 mg/dl   9%   ...............................  210 mg/dl   10%  ...............................  240 mg/dl  Performed at [address] [address]       LDL Calculated   Date/Time Value Ref Range Status   12/01/2023 04:45 PM 75 65 - 130 mg/dL Final   09/06/2023 10:12 AM 73 65 - 130 MG/DL Final   02/16/2023 11:28 AM 82 65 - 130 MG/DL Final   08/04/2021 11:42 AM 83 65 - 130 MG/DL Final      Last I/O:  I/O last 3 completed shifts:  In: 3896.7 (41.9 mL/kg) [P.O.:650; I.V.:2946.7 (31.7 mL/kg); IV Piggyback:300]  Out: 2300 (24.7 mL/kg) [Urine:2300 (0.7 mL/kg/hr)]  Weight: 93 kg     Past Cardiology Tests (Last 3 Years):  EKG:  ECG 12 Lead 03/19/2024    Echo:  No results found for this or any previous visit from the past 1095 days.    Ejection Fractions:  No results found for: \"EF\"  Cath:  No results found for this or any previous visit from the past 1095 days.    Stress Test:  No results found for this or any " previous visit from the past 1095 days.    Cardiac Imaging:  No results found for this or any previous visit from the past 1095 days.      Past Medical History:  He has a past medical history of Benign essential hypertension (04/04/2022), Benign prostatic hyperplasia without urinary obstruction (12/01/2023), Bilateral inguinal hernia without obstruction or gangrene (09/13/2023), CHF (congestive heart failure) (CMS/[facility]), Chronic atrial fibrillation (CMS/[facility]) (09/13/2023), Congestive heart failure (CMS/HCC) (12/01/2023), DDD (degenerative disc disease), lumbar, Degenerative joint disease (09/13/2023), Eczema, Essential tremor, History of polymyalgia rheumatica (03/20/2024), History of TIA (transient ischemic attack) (09/13/2023), IFG (impaired fasting glucose), Onychomycosis, Osteoarthritis, Osteoarthritis of both knees (03/18/2021), Other conditions influencing health status, Polymyalgia rheumatica (CMS/[facility]), Spondylosis without myelopathy or radiculopathy, lumbosacral region, and Unspecified abdominal hernia without obstruction or gangrene.    Past Surgical History:  He has a past surgical history that includes Other surgical history and Cataract extraction (Left).      Social History:  He reports that he has never smoked. He has never been exposed to tobacco smoke. He has never used smokeless tobacco. He reports current alcohol use of about 2.0 - 3.0 standard drinks of alcohol per week. He reports that he does not use drugs.    Family History:  No family history of cardiovascular disease     Allergies:  Furosemide    Inpatient Medications:  Scheduled medications   Medication Dose Route Frequency    apixaban  5 mg oral BID    atorvastatin  10 mg oral Daily    ceFAZolin  2 g intravenous q8h    metoprolol tartrate  25 mg oral BID    polyethylene glycol  17 g oral Daily     PRN medications   Medication    acetaminophen    HYDROcodone-acetaminophen    HYDROcodone-acetaminophen    metoprolol    naloxone    naloxone     naloxone    ondansetron ODT    Or    ondansetron     Continuous Medications   Medication Dose Last Rate    oxygen  2 L/min      sodium chloride 0.9%  100 mL/hr 100 mL/hr (04/02/24 0314)     Outpatient Medications:  Current Outpatient Medications   Medication Instructions    acetaminophen (Tylenol Extra Strength) 500 mg tablet oral, Every 6 hours PRN    atorvastatin (Lipitor) 10 mg tablet 1 tablet, oral, Daily    cholecalciferol (Vitamin D-3) 25 MCG (1000 UT) capsule oral, TAKE AS DIRECTED    Eliquis 5 mg, oral, 2 times daily, AS DIRECTED    metoprolol tartrate (Lopressor) 25 mg tablet 1 tablet, oral, 2 times daily    vitamins A,C,E-zinc-copper (ICaps AREDS) 4,296 mcg-226 mg-90 mg capsule oral, TAKE AS DIRECTED       Physical Exam:  Constitutional:       Appearance: Not in distress.   Eyes:      Conjunctiva/sclera: Conjunctivae normal.   HENT:    Mouth/Throat:      Pharynx: Oropharynx is clear.   Neck:      Vascular: No carotid bruit. JVD normal.   Pulmonary:      Breath sounds: Normal breath sounds. No wheezing. No rales.   Cardiovascular:      Irregularly irregular rhythm.      Murmurs: There is no murmur.      No gallop.  No click. No rub.   Abdominal:      Palpations: Abdomen is soft.      Tenderness: There is no abdominal tenderness.   Musculoskeletal:         General: No deformity. Neurological:      General: No focal deficit present.           Assessment/Plan   S/P total knee arthroplasty April 1, 2024  Longstanding persistent atrial fibrillation  Essential hypertension  History of TIA    4/2: Patient underwent a total knee arthroplasty procedure yesterday and is making a good recovery thus far.  His heart rate has been somewhat elevated and the hospitalist service has been reluctant to change medications given his lower blood pressure.  At this point we will continue the metoprolol tartrate to 25 mg twice daily and add digoxin as alternative rate modulating medication with no blood pressure effects.  Will  also continue the patient's apixaban for stroke prevention.  Hemoglobin is stable at 12.5 on morning laboratory studies.  Will make these changes and follow with you and hopefully the patient will recover quickly.      Peripheral IV 04/01/24 20 G Left;Anterior Forearm (Active)   Site Assessment Clean;Dry;Intact 04/01/24 2100   Dressing Status Clean;Dry;Occlusive 04/01/24 2100   Number of days: 1       Urethral Catheter 16 Fr. (Active)   Reason for Continuing Urinary Catheterization acute urinary retention 04/02/24 0800   Number of days: 0       Code Status:  Full Code    I spent 45 minutes in the professional and overall care of this patient.        Primitivo Gaming, DO

## 2024-04-02 NOTE — CONSULTS
"Inpatient consult to Urology  Consult performed by: Cindi Masterson MD  Consult ordered by: Alessandra Calvert MD      83 year old male with mild obstructive prostatic symptoms, previously on flomax, but self d/c'd this.  Developed retention after orthopedic surgery over 1 liter in bladder last pm.  Ortega was removed again at 11:15, yet to void    Blood pressure 92/60, pulse 88, temperature 36.6 °C (97.9 °F), temperature source Temporal, resp. rate 18, height 1.956 m (6' 5\"), weight 93 kg (205 lb), SpO2 95 %.   I/O last 3 completed shifts:  In: 3896.7 (41.9 mL/kg) [P.O.:650; I.V.:2946.7 (31.7 mL/kg); IV Piggyback:300]  Out: 2300 (24.7 mL/kg) [Urine:2300 (0.7 mL/kg/hr)]  Weight: 93 kg   I/O this shift:  In: 943.3 [P.O.:300; I.V.:543.3; IV Piggyback:100]  Out: 1600 [Urine:1600]       Active Ambulatory Problems     Diagnosis Date Noted    Bilateral inguinal hernia without obstruction or gangrene 09/13/2023    BPH associated with nocturia 08/11/2021    DDD (degenerative disc disease), lumbar 09/13/2023    Degenerative joint disease 09/13/2023    Essential tremor 09/13/2023    History of TIA (transient ischemic attack) 09/13/2023    Hyperlipidemia, unspecified 06/20/2019    Onychomycosis 01/22/2019    Osteoarthritis of both knees 03/18/2021    Chronic atrial fibrillation (CMS/[facility]) 09/13/2023    Pure hypercholesterolemia 09/13/2023    Stroke (CMS/[facility]) 09/13/2023    Vitamin D deficiency 09/13/2023    Benign essential hypertension 04/04/2022    Benign prostatic hyperplasia without urinary obstruction 12/01/2023    Cataract 12/01/2023    Chronic arthritis 12/01/2023    Congestive heart failure (CMS/HCC) 12/01/2023    Disease of spinal cord (CMS/[facility]) 05/31/2023    Disorder of prostate 12/01/2023    Dyslipidemia 12/01/2023    Eczema 12/01/2023    Impaired fasting glucose 06/23/2020    Impairment of balance 12/01/2023    Disorder of rotator cuff 12/01/2023    Knee pain 12/01/2023    Olecranon bursitis of left elbow " 07/22/2020    Sensorineural hearing loss (SNHL) of both ears 12/01/2023    Unilateral primary osteoarthritis, right knee 01/09/2024    Bilateral impacted cerumen 02/06/2024    History of polymyalgia rheumatica 03/20/2024    Preop cardiovascular exam 03/20/2024     Resolved Ambulatory Problems     Diagnosis Date Noted    Polymyalgia rheumatica (CMS/[facility]) 12/01/2023     Past Medical History:   Diagnosis Date    CHF (congestive heart failure) (CMS/[facility])     IFG (impaired fasting glucose)     Osteoarthritis     Other conditions influencing health status     Spondylosis without myelopathy or radiculopathy, lumbosacral region     Unspecified abdominal hernia without obstruction or gangrene         Results for orders placed or performed during the hospital encounter of 04/01/24 (from the past 24 hour(s))   CBC   Result Value Ref Range    WBC 12.3 (H) 4.4 - 11.3 x10*3/uL    nRBC 0.0 0.0 - 0.0 /100 WBCs    RBC 4.25 (L) 4.50 - 5.90 x10*6/uL    Hemoglobin 12.5 (L) 13.5 - 17.5 g/dL    Hematocrit 38.4 (L) 41.0 - 52.0 %    MCV 90 80 - 100 fL    MCH 29.4 26.0 - 34.0 pg    MCHC 32.6 32.0 - 36.0 g/dL    RDW 12.9 11.5 - 14.5 %    Platelets 225 150 - 450 x10*3/uL   Basic Metabolic Panel   Result Value Ref Range    Glucose 166 (H) 65 - 99 mg/dL    Sodium 138 133 - 145 mmol/L    Potassium 4.0 3.4 - 5.1 mmol/L    Chloride 101 97 - 107 mmol/L    Bicarbonate 24 24 - 31 mmol/L    Urea Nitrogen 16 8 - 25 mg/dL    Creatinine 1.00 0.40 - 1.60 mg/dL    eGFR 75 >60 mL/min/1.73m*2    Calcium 8.8 8.5 - 10.4 mg/dL    Anion Gap 13 <=19 mmol/L   Urinalysis with Reflex Microscopic   Result Value Ref Range    Color, Urine Colorless (N) Light-Yellow, Yellow, Dark-Yellow    Appearance, Urine Clear Clear    Specific Gravity, Urine 1.013 1.005 - 1.035    pH, Urine 6.0 5.0, 5.5, 6.0, 6.5, 7.0, 7.5, 8.0    Protein, Urine NEGATIVE NEGATIVE, 10 (TRACE), 20 (TRACE) mg/dL    Glucose, Urine 50 (TRACE) (A) Normal mg/dL    Blood, Urine 1.0 (3+) (A) NEGATIVE     Ketones, Urine TRACE (A) NEGATIVE mg/dL    Bilirubin, Urine NEGATIVE NEGATIVE    Urobilinogen, Urine Normal Normal mg/dL    Nitrite, Urine NEGATIVE NEGATIVE    Leukocyte Esterase, Urine NEGATIVE NEGATIVE   Microscopic Only, Urine   Result Value Ref Range    WBC, Urine 1-5 1-5, NONE /HPF    RBC, Urine >20 (A) NONE, 1-2, 3-5 /HPF    Mucus, Urine FEW Reference range not established. /LPF        PE:  awake, alert, nad  No sob nor wheezes  Abd soft, bladder not palpable  Normal phallus, testes      A/P:  flomax should continue and if fails this trial of void, recommend home w aquino for 5-7 d  Fu our office upon discharge

## 2024-04-02 NOTE — PROGRESS NOTES
"Physical Therapy    Physical Therapy Treatment    Patient Name: Qamar Mckinney \"Payton"  MRN: [id number]  Today's Date: 4/2/2024  Time Calculation  Start Time: 1314  Stop Time: 1400  Time Calculation (min): 46 min       Assessment/Plan   PT Assessment  Rehab Prognosis: Good  End of Session Communication: Bedside nurse  End of Session Patient Position: Bed, 2 rail up, Alarm on     PT Plan  Treatment/Interventions: Bed mobility, Transfer training, Gait training, Range of motion, Therapeutic exercise, Strengthening, Home exercise program  PT Plan: Skilled PT  PT Frequency: BID  PT Discharge Recommendations: Low intensity level of continued care  Equipment Recommended upon Discharge: Wheeled walker  PT Recommended Transfer Status: Assist x1, Assistive device  PT - OK to Discharge: Yes      General Visit Information:   PT  Visit  PT Received On: 04/02/24  General  Prior to Session Communication: Bedside nurse  Patient Position Received: Bed, 2 rail up, Alarm on  Preferred Learning Style: verbal  General Comment: Pt agreeable to therapy    Subjective   Precautions:  Precautions  Hearing/Visual Limitations: glasses  LE Weight Bearing Status: Weight Bearing as Tolerated  Medical Precautions: Fall precautions  Post-Surgical Precautions: Right total knee precautions  Vital Signs:       Objective   Pain:  Pain Assessment  Pain Assessment: 0-10  Pain Score: 4  Pain Type: Surgical pain  Pain Location: Knee  Pain Orientation: Right  Pain Interventions: Cold pack  Cognition:  Cognition  Overall Cognitive Status: Within Functional Limits  Orientation Level: Oriented X4  Postural Control:     Extremity/Trunk Assessments:    Activity Tolerance:     Treatments:  Therapeutic Exercise  Therapeutic Exercise Performed: Yes  Therapeutic Exercise Activity 1: Pt performed seated bilat LE ankle pumps/heel raises, LAQ, hip flexion, wally hip adduction, resisted hip abduction x15 reps each. Seated heel slides right x15 reps.    Bed Mobility 1  Bed " Mobility 1: Supine to sitting  Level of Assistance 1: Close supervision  Bed Mobility Comments 1: Pt using bilat UE's to move right LE off bed.  Bed Mobility 2  Bed Mobility  2: Sitting to supine  Level of Assistance 2: Close supervision  Bed Mobility Comments 2: Pt using bilat UE's to lift right LE onto bed sit to supine.    Ambulation/Gait Training 1  Surface 1: Level tile  Device 1: Rolling walker  Assistance 1: Contact guard  Comments/Distance (ft) 1: Pt ambulated with RW ~15' x1 and 55' x2 CGA. Pt demonstrated slow, step to gait pattern. Needed verbal cues for sequencing. Right knee a little soft/wobbly but no significant buckling.  Transfer 1  Transfer From 1: Bed to  Transfer to 1: Stand  Technique 1: Sit to stand  Transfer Device 1: Walker  Transfer Level of Assistance 1: Minimum assistance  Trials/Comments 1: Verbal cues to push from bed with both hands sit to stand.  Transfers 2  Transfer From 2: Stand to  Transfer to 2: Sit, Chair with arms  Technique 2: Stand to sit  Transfer Level of Assistance 2: Close supervision  Transfers 3  Transfer From 3: Chair with arms to  Transfer to 3: Stand  Technique 3: Sit to stand  Transfer Device 3: Walker  Transfer Level of Assistance 3: Minimum assistance  Trials/Comments 3: Pt used RW to take 6-7 steps back to bedsideCGA.  Transfers 4  Transfer From 4: Stand to  Transfer to 4: Sit, Bed  Technique 4: Stand to sit  Transfer Level of Assistance 4: Close supervision  Trials/Comments 4: Verbal cues to reach back for bed surface stand to sit.    Outcome Measures:  [facility] Basic Mobility  Turning from your back to your side while in a flat bed without using bedrails: A little  Moving from lying on your back to sitting on the side of a flat bed without using bedrails: A little  Moving to and from bed to chair (including a wheelchair): A little  Standing up from a chair using your arms (e.g. wheelchair or bedside chair): A little  To walk in hospital room: A little  Climbing 3-5  steps with railing: A lot  Basic Mobility - Total Score: 17    Education Documentation  Handouts, taught by Katelyn Longoria PTA at 4/2/2024  4:08 PM.  Learner: Patient  Readiness: Acceptance  Method: Explanation  Response: Verbalizes Understanding    Precautions, taught by Katelyn Longoria PTA at 4/2/2024  4:08 PM.  Learner: Patient  Readiness: Acceptance  Method: Explanation  Response: Verbalizes Understanding    Body Mechanics, taught by Katelyn Longoria PTA at 4/2/2024  4:08 PM.  Learner: Patient  Readiness: Acceptance  Method: Explanation  Response: Verbalizes Understanding    Home Exercise Program, taught by Katelyn Longoria PTA at 4/2/2024  4:08 PM.  Learner: Patient  Readiness: Acceptance  Method: Explanation  Response: Verbalizes Understanding    Mobility Training, taught by Katelyn Longoria PTA at 4/2/2024  4:08 PM.  Learner: Patient  Readiness: Acceptance  Method: Explanation  Response: Verbalizes Understanding    Education Comments  No comments found.        OP EDUCATION:  Outpatient Education  Individual(s) Educated: Patient  Education Provided: Home Exercise Program, Post-Op Precautions  Equipment: Thera-Band    Encounter Problems       Encounter Problems (Active)       Balance       LTG - Patient will maintain balance to allow for safe mobility (Progressing)       Start:  04/02/24    Expected End:  04/04/24               Mobility       STG - Patient will ambulate 100' w/ supervision and RW (Progressing)       Start:  04/02/24    Expected End:  04/04/24            STG - Patient will ascend and descend four stairs w/ 1 rail, cane and min assist (Progressing)       Start:  04/02/24    Expected End:  04/04/24            Pt will demonstrate good comprehension and performance pf TKA HEP (Progressing)       Start:  04/02/24    Expected End:  04/04/24               PT Transfers       STG - Patient to transfer to and from sit to supine IND (Progressing)       Start:  04/02/24    Expected End:  04/04/24            STG -  Patient will transfer sit to and from stand w/ distant supervision  (Progressing)       Start:  04/02/24    Expected End:  04/04/24               Safety       Pt will demonstrate comprehension and compliance of TKA precautions (Progressing)       Start:  04/02/24    Expected End:  04/04/24

## 2024-04-02 NOTE — PROGRESS NOTES
Physical Therapy    Physical Therapy Evaluation & Treatment    Patient Name: Toni Mckinney  MRN: [id number]  Today's Date: 4/2/2024   Time Calculation  Start Time: 0840  Stop Time: 0928  Time Calculation (min): 48 min    Assessment/Plan   PT Assessment  PT Assessment Results: Decreased strength, Decreased range of motion, Decreased endurance, Impaired balance, Decreased mobility, Decreased safety awareness, Impaired vision, Decreased skin integrity, Orthopedic restrictions, Pain  Rehab Prognosis: Good  Evaluation/Treatment Tolerance: Patient tolerated treatment well (slightly increased pain w/ activity)  Medical Staff Made Aware: Yes  Strengths: Ability to acquire knowledge, Attitude of self, Premorbid level of function  Barriers to Participation: Comorbidities  End of Session Communication: Bedside nurse  Assessment Comment: Pt is an 83 y.o. male adm for elective Rt TKA. Pt was IND PTA, lives w/ spouse in a multilevel home, used no AD, able to drive, denies falls. Pt Pt moving at min assist level, cues for safety and technique, would benefit from continued skilled PT services to progress functional mobility, ROM and strength post op.  End of Session Patient Position: Up in chair, Alarm on   IP OR SWING BED PT PLAN  Inpatient or Swing Bed: Inpatient  PT Plan  Treatment/Interventions: Bed mobility, Transfer training, Gait training, Stair training, Balance training, Strengthening, Endurance training, Range of motion, Therapeutic exercise, Therapeutic activity, Home exercise program  PT Plan: Skilled PT  PT Frequency: BID (12 x week)  PT Discharge Recommendations: Low intensity level of continued care  Equipment Recommended upon Discharge: Wheeled walker (encouraged pt have spouse bring in RW from home)  PT Recommended Transfer Status: Assist x1, Assist x2, Assistive device  PT - OK to Discharge: Yes      Subjective     General Visit Information:  General  Reason for Referral: recent surgery  Referred By:   Frames  Past Medical History Relevant to Rehab: a-fib, HTN, BPH, CHF, TIA, essential tremor, PMR, OA bilat knees, macular degeneration  Family/Caregiver Present: No  Co-Treatment: OT  Co-Treatment Reason: Pt safety w/ mobility post op  Prior to Session Communication: Bedside nurse  Patient Position Received: Bed, 2 rail up, Alarm on  Preferred Learning Style: verbal  General Comment: Pt agreeable to PT eval, cleared per nurse  Home Living:  Home Living  Type of Home: House  Lives With: Spouse  Home Adaptive Equipment: Cane, Walker rolling or standard, Wheelchair-manual, Reacher, Sock aid  Home Layout: Multi-level, Laundry in basement, Able to live on main level with bedroom/bathroom  Home Access: Stairs to enter with rails  Entrance Stairs-Rails:  (bar one side)  Entrance Stairs-Number of Steps: 2+2  Bathroom Shower/Tub: Tub/shower unit  Bathroom Toilet: Handicapped height  Bathroom Equipment: Grab bars in shower, Bedside commode  Bathroom Accessibility: bed/bath on main  Home Living Comments: spouse home, can assist, does use basement level for laundry, extra fridge  Prior Level of Function:  Prior Function Per Pt/Caregiver Report  Level of [place]: Independent with ADLs and functional transfers, Independent with homemaking with ambulation  Receives Help From: Other (Comment) (spouse  prn)  ADL Assistance: Independent  Homemaking Assistance: Independent  Ambulatory Assistance: Independent (no device)  Vocational: Retired  Prior Function Comments: Pt reports thast he drives, denies falls  Precautions:  Precautions  Hearing/Visual Limitations: glasses  LE Weight Bearing Status: Weight Bearing as Tolerated  Medical Precautions: Fall precautions  Post-Surgical Precautions: Right total knee precautions  Precautions Comment: a-fib  Vital Signs:  Vital Signs  Heart Rate: (!) 115 (up to 148)  Heart Rate Source: Monitor  Patient Position: Sitting    Objective   Pain:  Pain Assessment  Pain Assessment: 0-10  Pain Score:  1 (up to 3/10 w/ activity)  Pain Type: Surgical pain  Pain Location: Knee  Pain Orientation: Right  Cognition:  Cognition  Overall Cognitive Status: Within Functional Limits  Orientation Level: Oriented X4  Impulsive: Mildly    General Assessments:     Activity Tolerance  Activity Tolerance Comments: Fair post op    Sensation  Sensation Comment: pt denied abn sensation       Postural Control  Posture Comment: very forward rounded posture, head down    Dynamic Standing Balance  Dynamic Standing-Balance Support: Bilateral upper extremity supported (RW)  Dynamic Standing-Balance:  (amb, turns)  Dynamic Standing-Comments: fair-  Functional Assessments:  Bed Mobility  Bed Mobility: Yes  Bed Mobility 1  Bed Mobility 1: Supine to sitting  Level of Assistance 1: Minimum assistance, Minimal verbal cues  Bed Mobility Comments 1: to Rt EOB, HOB elevated part way, assist for RLE over EOB    Transfers  Transfer: Yes  Transfer 1  Technique 1: Sit to stand, Stand to sit  Transfer Device 1: Walker  Transfer Level of Assistance 1: Minimum assistance  Trials/Comments 1: Cues for safe hand placement, increased effort for return to sit due to arthritic Lt knee as well, tall stature    Ambulation/Gait Training  Ambulation/Gait Training Performed: Yes  Ambulation/Gait Training 1  Surface 1: Level tile  Device 1: Rolling walker  Assistance 1: Minimum assistance, Minimal verbal cues (+2)  Comments/Distance (ft) 1: Pt amb ~4' to bedside chair, then another~ 25' in room, slow pace, good sequence, very forward rounded posture w/ head down, slightly decreased KAL[place], mild wobbliness Rt knee but no LOB.  Extremity/Trunk Assessments:  RLE   RLE : Exceptions to WFL  AROM RLE (degrees)  RLE AROM Comment: Performed LE exercises of ankle pumps, quad sets, glute sets x 5; encouraged pt performance on own  R Knee Flexion 0-130: 80 (sitting)  R Knee Extension 0-130: -15 (supine)  R Ankle Dorsiflexion 0-20: 5  LLE   LLE : Within Functional Limits  (Performed LE exercises of ankle pumps, quad sets, glute sets x 5; encouraged pt performance on own)  Treatments:  Ambulation/Gait Training  Ambulation/Gait Training Performed: Yes  Ambulation/Gait Training 1  Surface 1: Level tile  Device 1: Rolling walker  Assistance 1: Minimum assistance, Minimal verbal cues (+2)  Comments/Distance (ft) 1: Pt amb ~4' to bedside chair, then another~ 25' in room, slow pace, good sequence, very forward rounded posture w/ head down, slightly decreased KALPANA, mild wobbliness Rt knee but no LOB.  Outcome Measures:  [facility] Basic Mobility  Turning from your back to your side while in a flat bed without using bedrails: A little  Moving from lying on your back to sitting on the side of a flat bed without using bedrails: A little  Moving to and from bed to chair (including a wheelchair): A little  Standing up from a chair using your arms (e.g. wheelchair or bedside chair): A little  To walk in hospital room: A little  Climbing 3-5 steps with railing: A lot  Basic Mobility - Total Score: 17    Encounter Problems       Encounter Problems (Active)       Balance       LTG - Patient will maintain balance to allow for safe mobility (Progressing)       Start:  04/02/24    Expected End:  04/04/24               Mobility       STG - Patient will ambulate 100' w/ supervision and RW (Progressing)       Start:  04/02/24    Expected End:  04/04/24            STG - Patient will ascend and descend four stairs w/ 1 rail, cane and min assist (Progressing)       Start:  04/02/24    Expected End:  04/04/24            Pt will demonstrate good comprehension and performance pf TKA HEP (Progressing)       Start:  04/02/24    Expected End:  04/04/24               PT Transfers       STG - Patient to transfer to and from sit to supine IND (Progressing)       Start:  04/02/24    Expected End:  04/04/24            STG - Patient will transfer sit to and from stand w/ distant supervision  (Progressing)       Start:   04/02/24    Expected End:  04/04/24               Safety       Pt will demonstrate comprehension and compliance of TKA precautions (Progressing)       Start:  04/02/24    Expected End:  04/04/24                   Education Documentation  Precautions, taught by Candida Nichole, PT at 4/2/2024 10:58 AM.  Learner: Patient  Readiness: Acceptance  Method: Explanation  Response: Verbalizes Understanding, Needs Reinforcement    Mobility Training, taught by Candida Nichole, PT at 4/2/2024 10:58 AM.  Learner: Patient  Readiness: Acceptance  Method: Explanation  Response: Verbalizes Understanding, Needs Reinforcement    Education Comments  No comments found.

## 2024-04-02 NOTE — PROGRESS NOTES
"Occupational Therapy    Evaluation    Patient Name: Qamar Mckinney \"Payton"  MRN: [id number]  Today's Date: 4/2/2024  Time Calculation  Start Time: 0845  Stop Time: 0928  Time Calculation (min): 43 min    Assessment  IP OT Assessment  OT Assessment: Pt is 82 y/o male admitted for Rt TKA. Pt presents w/ decreased ADL skills/ functional mobility, decreasedactivity tolerance and surgical limitations. recommend OT services to address above deficits.  Prognosis: Good  Barriers to Discharge: None  Evaluation/Treatment Tolerance: Patient tolerated treatment well  End of Session Communication: Bedside nurse  End of Session Patient Position: Up in chair, Alarm on (eating breakfast)  Plan:  Treatment Interventions: ADL retraining, Functional transfer training, Endurance training, Patient/family training, Equipment evaluation/education  OT Frequency: 4 times per week  OT Discharge Recommendations: Low intensity level of continued care  Equipment Recommended upon Discharge: Wheeled walker    Subjective   Current Problem:  1. S/P total knee arthroplasty, right  Referral to Home Health        General:  General  Reason for Referral: recent surgery  Referred By: Dr. Silva  Past Medical History Relevant to Rehab: a-fib, HTN, BPH, CHF, TIA, essential tremor, PMR, OA ag. knees, macular degenertion  Family/Caregiver Present: No  Co-Treatment: PT  Co-Treatment Reason: For safety w/ mobility post op  Prior to Session Communication: Bedside nurse  Patient Position Received: Bed, 2 rail up, Alarm on  Preferred Learning Style: verbal  General Comment: Pt agreeable to therapy  Precautions:  Hearing/Visual Limitations: glasses  LE Weight Bearing Status: Weight Bearing as Tolerated  Medical Precautions: Fall precautions  Post-Surgical Precautions: Right total knee precautions  Precautions Comment: a-fib  Vital Signs:  Heart Rate: (!) 115 (up to 148)  Pain:  Pain Assessment  Pain Assessment: 0-10  Pain Score: 3  Pain Type: Surgical pain  Pain " Location: Knee  Pain Orientation: Right    Objective   Cognition:  Overall Cognitive Status: Within Functional Limits  Orientation Level: Oriented X4  Impulsive: Mildly           Home Living:  Type of Home: House  Lives With: Spouse  Home Adaptive Equipment: Cane, Walker rolling or standard, Reacher, Sock aid, Long-handled shoehorn  Home Layout: Multi-level, Laundry in basement, Able to live on main level with bedroom/bathroom  Home Access: Stairs to enter with rails  Entrance Stairs-Rails:  (bar on 1 side)  Entrance Stairs-Number of Steps: 2+2  Bathroom Shower/Tub: Tub/shower unit  Bathroom Toilet: Handicapped height  Bathroom Equipment: Grab bars in shower, Bedside commode  Bathroom Accessibility: Bed/ bath on main  Home Living Comments: sppouse home, able to assist   Prior Function:  Level of [place]: Independent with ADLs and functional transfers, Independent with homemaking with ambulation  Receives Help From: Other (Comment) (Spouse as needed)  ADL Assistance: Independent  Homemaking Assistance: Independent  Ambulatory Assistance: Independent (no device)  Vocational: Retired  Hand Dominance: Right  IADL History:  Current License: Yes  Mode of Transportation: Car  ADL:  Eating Assistance: Stand by  Eating Deficit: Setup (for breakfast)  Grooming Assistance: Stand by  Grooming Deficit: Setup (per clinical judgement)  Bathing Assistance: Moderate  Bathing Deficit: Buttocks, Left lower leg including foot, Right lower leg including foot (per clinical judgement)  UE Dressing Assistance: Stand by  UE Dressing Deficit: Setup (per clinical judgement)  LE Dressing Assistance: Moderate  LE Dressing Deficit: Steadying, Setup, Supervision/safety, Don/doff R sock, Don/doff L sock, Thread RLE into pants, Thread LLE into pants, Thread RLE into underwear, Thread LLE into underwear, Pull up over hips, Don/doff R shoe, Don/doff L shoe (per clinical judgement)  Toileting Assistance with Device: Not performed  Functional  Assistance: Not performed  Activity Tolerance:  Endurance: Decreased tolerance for upright activites  Bed Mobility/Transfers: Bed Mobility 1  Bed Mobility 1: Supine to sitting  Level of Assistance 1: Minimum assistance  Bed Mobility Comments 1: for rtl eg to EOB,HOB elev. trunk up    Transfer 1  Transfer From 1: Bed to  Transfer to 1: Stand  Technique 1: Sit to stand  Transfer Device 1: Walker  Transfer Level of Assistance 1: Minimum assistance  Trials/Comments 1: Verbal cues for safe hand placemednt  Transfers 2  Transfer From 2: Stand to  Transfer to 2: Sit, Chair with arms  Technique 2: Stand to sit  Transfer Device 2: Walker  Transfer Level of Assistance 2: Minimum assistance  Trials/Comments 2: Verbal cues for hand placement      Functional Mobility:  Functional Mobility  Functional Mobility Performed: Yes  Functional Mobility 1  Surface 1: Level tile  Device 1: Rolling walker  Assistance 1: Minimum assistance  Comments 1: Pt sidestepped to HOB then over to chair, pt then ambulated from chair to doorway and back to chair w/ min. assist w/ FWW.  Modalities:     IADL's:   Current License: Yes  Mode of Transportation: Car  Vision: Vision - Basic Assessment  Current Vision: Wears glasses all the time  Sensation:  Light Touch: No apparent deficits  Strength:  Strength Comments: 4/5 (BUE)  Perception:     Coordination:  Movements are Fluid and Coordinated: Yes   Hand Function:  Hand Function  Gross Grasp: Functional  Coordination: Functional  Extremities: RUE   RUE : Within Functional Limits and LUE   LUE: Within Functional Limits    Treatment: See ADL section/education section    Outcome Measures: [facility] Daily Activity  Putting on and taking off regular lower body clothing: A lot  Bathing (including washing, rinsing, drying): A lot  Putting on and taking off regular upper body clothing: A little  Toileting, which includes using toilet, bedpan or urinal: A little  Taking care of personal grooming such as brushing  teeth: A little  Eating Meals: None  Daily Activity - Total Score: 17      Education Documentation  ADL Training, taught by Cristina Elder OT at 4/2/2024 11:36 AM.  Learner: Patient  Readiness: Acceptance  Method: Explanation  Response: Needs Reinforcement    Education Comments  No comments found.      Goals:   Encounter Problems       Encounter Problems (Active)       OT Goals       ADL's (Progressing)       Start:  04/02/24    Expected End:  04/16/24       Pt will complete bathing, dressing and grooming tasks w/ mod I w/ Ae/ compe nsagtory tech as need for ease, safety and increased independence in self care tasks         Functional transfers (Progressing)       Start:  04/02/24    Expected End:  04/16/24       Pt will demon functional transfers w/ mod I w/ LRD for safety and increased independence in functional transfers         Precautions (Progressing)       Start:  04/02/24    Expected End:  04/16/24       Pt will verbalize/ demon surgical precautions consistently for safety and increased independence in daily tasks and functional mobility.,

## 2024-04-02 NOTE — PROGRESS NOTES
"Toni Mckinney is a 83 y.o. male on day 1 of admission presenting with S/P total knee arthroplasty, right.    Subjective   Patient seen today.  Worked with physical therapy.  He was seen by cardiology and was started on digoxin.  Pain is well-controlled.  He is currently undergoing a voiding trial.       Objective     Physical Exam  Right lower extremity: Postop dressing clean dry and intact.  No calf tenderness.  Neurovascular intact distally.  Last Recorded Vitals  Blood pressure 92/60, pulse 88, temperature 36.6 °C (97.9 °F), temperature source Temporal, resp. rate 18, height 1.956 m (6' 5\"), weight 93 kg (205 lb), SpO2 95 %.  Intake/Output last 3 Shifts:  I/O last 3 completed shifts:  In: 3896.7 (41.9 mL/kg) [P.O.:650; I.V.:2946.7 (31.7 mL/kg); IV Piggyback:300]  Out: 2300 (24.7 mL/kg) [Urine:2300 (0.7 mL/kg/hr)]  Weight: 93 kg     Relevant Results      Scheduled medications  apixaban, 5 mg, oral, BID  atorvastatin, 10 mg, oral, Daily  digoxin, 250 mcg, intravenous, Once  [START ON 4/3/2024] digoxin, 125 mcg, oral, Daily  metoprolol tartrate, 25 mg, oral, BID  polyethylene glycol, 17 g, oral, Daily      Continuous medications  oxygen, 2 L/min  sodium chloride 0.9%, 100 mL/hr, Last Rate: 100 mL/hr (04/02/24 1123)      PRN medications  PRN medications: acetaminophen, HYDROcodone-acetaminophen, HYDROcodone-acetaminophen, metoprolol, naloxone, naloxone, naloxone, ondansetron ODT **OR** ondansetron  Results for orders placed or performed during the hospital encounter of 04/01/24 (from the past 24 hour(s))   CBC   Result Value Ref Range    WBC 12.3 (H) 4.4 - 11.3 x10*3/uL    nRBC 0.0 0.0 - 0.0 /100 WBCs    RBC 4.25 (L) 4.50 - 5.90 x10*6/uL    Hemoglobin 12.5 (L) 13.5 - 17.5 g/dL    Hematocrit 38.4 (L) 41.0 - 52.0 %    MCV 90 80 - 100 fL    MCH 29.4 26.0 - 34.0 pg    MCHC 32.6 32.0 - 36.0 g/dL    RDW 12.9 11.5 - 14.5 %    Platelets 225 150 - 450 x10*3/uL   Basic Metabolic Panel   Result Value Ref Range    Glucose 166 " (H) 65 - 99 mg/dL    Sodium 138 133 - 145 mmol/L    Potassium 4.0 3.4 - 5.1 mmol/L    Chloride 101 97 - 107 mmol/L    Bicarbonate 24 24 - 31 mmol/L    Urea Nitrogen 16 8 - 25 mg/dL    Creatinine 1.00 0.40 - 1.60 mg/dL    eGFR 75 >60 mL/min/1.73m*2    Calcium 8.8 8.5 - 10.4 mg/dL    Anion Gap 13 <=19 mmol/L   Urinalysis with Reflex Microscopic   Result Value Ref Range    Color, Urine Colorless (N) Light-Yellow, Yellow, Dark-Yellow    Appearance, Urine Clear Clear    Specific Gravity, Urine 1.013 1.005 - 1.035    pH, Urine 6.0 5.0, 5.5, 6.0, 6.5, 7.0, 7.5, 8.0    Protein, Urine NEGATIVE NEGATIVE, 10 (TRACE), 20 (TRACE) mg/dL    Glucose, Urine 50 (TRACE) (A) Normal mg/dL    Blood, Urine 1.0 (3+) (A) NEGATIVE    Ketones, Urine TRACE (A) NEGATIVE mg/dL    Bilirubin, Urine NEGATIVE NEGATIVE    Urobilinogen, Urine Normal Normal mg/dL    Nitrite, Urine NEGATIVE NEGATIVE    Leukocyte Esterase, Urine NEGATIVE NEGATIVE   Microscopic Only, Urine   Result Value Ref Range    WBC, Urine 1-5 1-5, NONE /HPF    RBC, Urine >20 (A) NONE, 1-2, 3-5 /HPF    Mucus, Urine FEW Reference range not established. /LPF                            Assessment/Plan   Principal Problem:    S/P total knee arthroplasty, right  Postop day 1 from right total knee arthroplasty.  Plan for physical therapy for mobilization and strengthening.  We resumed his home blood thinner.  Appreciate cardiology Recs.  Currently undergoing a voiding trial.  Pain control as needed.  Plan for discharge home with home health physical therapy on 4/3/2024.             Yonis Silva MD

## 2024-04-02 NOTE — NURSING NOTE
Patient was hesitant about Ortega catheter insertion d/t a lot of burning in penis insertion site post-op and asked to speak to physician. Dr Calvert came up and talked to patient about his recommendation and benefits of catheter insertion. Dr Calvert also consulted urology and ordered urinalysis. Patient agreed to have catheter inserted.     Ortega catheter 16 Fr inserted to CD in sterile fashion without difficulty with 1050 ml of clear yellow urine. Patient tolerated procedure well.

## 2024-04-02 NOTE — PROGRESS NOTES
"Toni Mckinney is a 83 y.o. male on day 1 of admission presenting with S/P total knee arthroplasty, right.    Subjective   Patient seen and examined.  Patient had urinary retention overnight and now has a Ortega catheter.  He also went into A-fib RVR.  Denies any chest pain, palpitations, dyspnea.       Objective     Physical Exam  Constitutional:       General: He is not in acute distress.     Appearance: He is not ill-appearing or toxic-appearing.   Cardiovascular:      Rate and Rhythm: Tachycardia present. Rhythm irregular.      Pulses: Normal pulses.      Heart sounds: Normal heart sounds.   Pulmonary:      Effort: Pulmonary effort is normal.      Breath sounds: Normal breath sounds.   Abdominal:      General: Bowel sounds are normal.      Palpations: Abdomen is soft.   Skin:     General: Skin is warm and dry.      Capillary Refill: Capillary refill takes 2 to 3 seconds.      Comments: Drsg to RLE dry and intact    Neurological:      General: No focal deficit present.      Mental Status: He is alert and oriented to person, place, and time.   Psychiatric:         Mood and Affect: Mood normal.         Behavior: Behavior normal.     Last Recorded Vitals  Blood pressure 113/82, pulse (!) 136, temperature 36.7 °C (98.1 °F), temperature source Temporal, resp. rate 20, height 1.956 m (6' 5\"), weight 93 kg (205 lb), SpO2 99 %.  Intake/Output last 3 Shifts:  I/O last 3 completed shifts:  In: 3896.7 (41.9 mL/kg) [P.O.:650; I.V.:2946.7 (31.7 mL/kg); IV Piggyback:300]  Out: 2300 (24.7 mL/kg) [Urine:2300 (0.7 mL/kg/hr)]  Weight: 93 kg     Relevant Results  Scheduled medications  apixaban, 5 mg, oral, BID  atorvastatin, 10 mg, oral, Daily  ceFAZolin, 2 g, intravenous, q8h  metoprolol tartrate, 25 mg, oral, BID  polyethylene glycol, 17 g, oral, Daily      Continuous medications  oxygen, 2 L/min  sodium chloride 0.9%, 100 mL/hr, Last Rate: 100 mL/hr (04/02/24 0314)      PRN medications  PRN medications: acetaminophen, " HYDROcodone-acetaminophen, HYDROcodone-acetaminophen, metoprolol, naloxone, naloxone, naloxone, ondansetron ODT **OR** ondansetron     following data reviewed    WBC-12.3  Hgb-12.5  Hct-38.4  Platelets-225      Na-138  K+-4.0  Cl- 101  Bicarb-24  BUN-16  Creatinine-1.0                           Assessment/Plan   Principal Problem:    S/P total knee arthroplasty, right    RT TKA  -pain management  -PT, OT     HTN  -continue BB with parameters  -monitor blood pressure     Chronic Afib with RVR  -telemetry  -rate control  -continue BB  -continue Eliquis  -Cardiology consult     Hyperlipidemia  -Continue statin    Urinary retention  -Ortega catheter  -Trial of void when ambulatory     Plan   Above plan discussed with pt and he is in agreement      AMRIK Forman-CNP

## 2024-04-02 NOTE — NURSING NOTE
Patient not able to have full stream of urine, only dribbling little at at time. Post void residual 862 ml. Dr Calvert notified and gave phone order to insert Ortega catheter to CD.

## 2024-04-02 NOTE — CARE PLAN
The patient's goals for the shift include  decrease pain and get aquino out    The clinical goals for the shift include help manage pain control, monitor I and O's, monitor VS, and promote rest      04/02/24 at 7:31 PM - Sona Carlos RN

## 2024-04-03 ENCOUNTER — PHARMACY VISIT (OUTPATIENT)
Dept: PHARMACY | Facility: CLINIC | Age: 84
End: 2024-04-03
Payer: MEDICARE

## 2024-04-03 LAB
ANION GAP SERPL CALC-SCNC: 9 MMOL/L
BACTERIA UR CULT: NO GROWTH
BUN SERPL-MCNC: 13 MG/DL (ref 8–25)
CALCIUM SERPL-MCNC: 8.9 MG/DL (ref 8.5–10.4)
CHLORIDE SERPL-SCNC: 100 MMOL/L (ref 97–107)
CO2 SERPL-SCNC: 24 MMOL/L (ref 24–31)
CREAT SERPL-MCNC: 0.9 MG/DL (ref 0.4–1.6)
EGFRCR SERPLBLD CKD-EPI 2021: 85 ML/MIN/1.73M*2
ERYTHROCYTE [DISTWIDTH] IN BLOOD BY AUTOMATED COUNT: 13 % (ref 11.5–14.5)
GLUCOSE SERPL-MCNC: 122 MG/DL (ref 65–99)
HCT VFR BLD AUTO: 38.4 % (ref 41–52)
HGB BLD-MCNC: 12.7 G/DL (ref 13.5–17.5)
MCH RBC QN AUTO: 30 PG (ref 26–34)
MCHC RBC AUTO-ENTMCNC: 33.1 G/DL (ref 32–36)
MCV RBC AUTO: 91 FL (ref 80–100)
NRBC BLD-RTO: 0 /100 WBCS (ref 0–0)
PLATELET # BLD AUTO: 232 X10*3/UL (ref 150–450)
POTASSIUM SERPL-SCNC: 4.2 MMOL/L (ref 3.4–5.1)
RBC # BLD AUTO: 4.24 X10*6/UL (ref 4.5–5.9)
SODIUM SERPL-SCNC: 133 MMOL/L (ref 133–145)
WBC # BLD AUTO: 15.1 X10*3/UL (ref 4.4–11.3)

## 2024-04-03 PROCEDURE — 97116 GAIT TRAINING THERAPY: CPT | Mod: GP,CQ

## 2024-04-03 PROCEDURE — 97530 THERAPEUTIC ACTIVITIES: CPT | Mod: GP,CQ

## 2024-04-03 PROCEDURE — 97535 SELF CARE MNGMENT TRAINING: CPT | Mod: GO,CO

## 2024-04-03 PROCEDURE — 2500000001 HC RX 250 WO HCPCS SELF ADMINISTERED DRUGS (ALT 637 FOR MEDICARE OP): Performed by: INTERNAL MEDICINE

## 2024-04-03 PROCEDURE — 85027 COMPLETE CBC AUTOMATED: CPT | Performed by: NURSE PRACTITIONER

## 2024-04-03 PROCEDURE — 2500000001 HC RX 250 WO HCPCS SELF ADMINISTERED DRUGS (ALT 637 FOR MEDICARE OP): Performed by: NURSE PRACTITIONER

## 2024-04-03 PROCEDURE — 7100000011 HC EXTENDED STAY RECOVERY HOURLY - NURSING UNIT

## 2024-04-03 PROCEDURE — RXMED WILLOW AMBULATORY MEDICATION CHARGE

## 2024-04-03 PROCEDURE — 80048 BASIC METABOLIC PNL TOTAL CA: CPT | Performed by: NURSE PRACTITIONER

## 2024-04-03 PROCEDURE — 1200000002 HC GENERAL ROOM WITH TELEMETRY DAILY

## 2024-04-03 PROCEDURE — 36415 COLL VENOUS BLD VENIPUNCTURE: CPT | Performed by: NURSE PRACTITIONER

## 2024-04-03 PROCEDURE — 2500000004 HC RX 250 GENERAL PHARMACY W/ HCPCS (ALT 636 FOR OP/ED): Performed by: STUDENT IN AN ORGANIZED HEALTH CARE EDUCATION/TRAINING PROGRAM

## 2024-04-03 PROCEDURE — 97110 THERAPEUTIC EXERCISES: CPT | Mod: GP,CQ

## 2024-04-03 PROCEDURE — 99232 SBSQ HOSP IP/OBS MODERATE 35: CPT | Performed by: INTERNAL MEDICINE

## 2024-04-03 PROCEDURE — 2500000001 HC RX 250 WO HCPCS SELF ADMINISTERED DRUGS (ALT 637 FOR MEDICARE OP): Performed by: STUDENT IN AN ORGANIZED HEALTH CARE EDUCATION/TRAINING PROGRAM

## 2024-04-03 PROCEDURE — 2500000002 HC RX 250 W HCPCS SELF ADMINISTERED DRUGS (ALT 637 FOR MEDICARE OP, ALT 636 FOR OP/ED): Performed by: UROLOGY

## 2024-04-03 RX ORDER — DIGOXIN 125 MCG
125 TABLET ORAL DAILY
Qty: 30 TABLET | Refills: 0 | Status: SHIPPED | OUTPATIENT
Start: 2024-04-04 | End: 2024-05-04 | Stop reason: SDUPTHER

## 2024-04-03 RX ORDER — TAMSULOSIN HYDROCHLORIDE 0.4 MG/1
0.4 CAPSULE ORAL DAILY
Qty: 30 CAPSULE | Refills: 0 | Status: SHIPPED | OUTPATIENT
Start: 2024-04-04 | End: 2024-05-03 | Stop reason: HOSPADM

## 2024-04-03 RX ORDER — HYDROCODONE BITARTRATE AND ACETAMINOPHEN 5; 325 MG/1; MG/1
1 TABLET ORAL EVERY 6 HOURS PRN
Qty: 20 TABLET | Refills: 0
Start: 2024-04-03 | End: 2024-05-02 | Stop reason: ALTCHOICE

## 2024-04-03 RX ORDER — POLYETHYLENE GLYCOL 3350 17 G/17G
17 POWDER, FOR SOLUTION ORAL DAILY PRN
Start: 2024-04-03

## 2024-04-03 RX ADMIN — TAMSULOSIN HYDROCHLORIDE 0.4 MG: 0.4 CAPSULE ORAL at 08:32

## 2024-04-03 RX ADMIN — POLYETHYLENE GLYCOL 3350 17 G: 17 POWDER, FOR SOLUTION ORAL at 08:34

## 2024-04-03 RX ADMIN — APIXABAN 5 MG: 5 TABLET, FILM COATED ORAL at 08:32

## 2024-04-03 RX ADMIN — APIXABAN 5 MG: 5 TABLET, FILM COATED ORAL at 20:57

## 2024-04-03 RX ADMIN — DIGOXIN 125 MCG: 125 TABLET ORAL at 08:32

## 2024-04-03 RX ADMIN — HYDROCODONE BITARTRATE AND ACETAMINOPHEN 1 TABLET: 5; 325 TABLET ORAL at 11:26

## 2024-04-03 RX ADMIN — ATORVASTATIN CALCIUM 10 MG: 10 TABLET, FILM COATED ORAL at 08:32

## 2024-04-03 RX ADMIN — HYDROCODONE BITARTRATE AND ACETAMINOPHEN 1 TABLET: 5; 325 TABLET ORAL at 20:57

## 2024-04-03 RX ADMIN — HYDROCODONE BITARTRATE AND ACETAMINOPHEN 2 TABLET: 5; 325 TABLET ORAL at 15:43

## 2024-04-03 RX ADMIN — METOPROLOL TARTRATE 25 MG: 25 TABLET, FILM COATED ORAL at 08:32

## 2024-04-03 ASSESSMENT — COGNITIVE AND FUNCTIONAL STATUS - GENERAL
STANDING UP FROM CHAIR USING ARMS: A LITTLE
MOVING FROM LYING ON BACK TO SITTING ON SIDE OF FLAT BED WITH BEDRAILS: A LITTLE
WALKING IN HOSPITAL ROOM: A LITTLE
DRESSING REGULAR LOWER BODY CLOTHING: A LITTLE
DRESSING REGULAR LOWER BODY CLOTHING: A LITTLE
MOBILITY SCORE: 17
MOBILITY SCORE: 17
DAILY ACTIVITIY SCORE: 20
CLIMB 3 TO 5 STEPS WITH RAILING: A LOT
TOILETING: A LITTLE
MOBILITY SCORE: 17
DAILY ACTIVITIY SCORE: 20
MOBILITY SCORE: 17
HELP NEEDED FOR BATHING: A LITTLE
MOVING TO AND FROM BED TO CHAIR: A LITTLE
WALKING IN HOSPITAL ROOM: A LITTLE
MOVING FROM LYING ON BACK TO SITTING ON SIDE OF FLAT BED WITH BEDRAILS: A LITTLE
TOILETING: A LITTLE
STANDING UP FROM CHAIR USING ARMS: A LITTLE
STANDING UP FROM CHAIR USING ARMS: A LITTLE
DRESSING REGULAR UPPER BODY CLOTHING: A LITTLE
DRESSING REGULAR LOWER BODY CLOTHING: A LITTLE
HELP NEEDED FOR BATHING: A LITTLE
CLIMB 3 TO 5 STEPS WITH RAILING: A LOT
TURNING FROM BACK TO SIDE WHILE IN FLAT BAD: A LITTLE
MOVING TO AND FROM BED TO CHAIR: A LITTLE
WALKING IN HOSPITAL ROOM: A LITTLE
MOVING FROM LYING ON BACK TO SITTING ON SIDE OF FLAT BED WITH BEDRAILS: A LITTLE
TURNING FROM BACK TO SIDE WHILE IN FLAT BAD: A LITTLE
DRESSING REGULAR UPPER BODY CLOTHING: A LITTLE
HELP NEEDED FOR BATHING: A LITTLE
TURNING FROM BACK TO SIDE WHILE IN FLAT BAD: A LITTLE
MOVING FROM LYING ON BACK TO SITTING ON SIDE OF FLAT BED WITH BEDRAILS: A LITTLE
MOVING TO AND FROM BED TO CHAIR: A LITTLE
CLIMB 3 TO 5 STEPS WITH RAILING: A LOT
TURNING FROM BACK TO SIDE WHILE IN FLAT BAD: A LITTLE
STANDING UP FROM CHAIR USING ARMS: A LITTLE
TOILETING: A LITTLE
WALKING IN HOSPITAL ROOM: A LITTLE
MOVING TO AND FROM BED TO CHAIR: A LITTLE
DRESSING REGULAR UPPER BODY CLOTHING: A LITTLE
DAILY ACTIVITIY SCORE: 20
CLIMB 3 TO 5 STEPS WITH RAILING: A LOT

## 2024-04-03 ASSESSMENT — PAIN DESCRIPTION - ORIENTATION: ORIENTATION: RIGHT

## 2024-04-03 ASSESSMENT — PAIN SCALES - GENERAL
PAINLEVEL_OUTOF10: 5 - MODERATE PAIN
PAINLEVEL_OUTOF10: 2
PAINLEVEL_OUTOF10: 0 - NO PAIN
PAINLEVEL_OUTOF10: 4
PAINLEVEL_OUTOF10: 8
PAINLEVEL_OUTOF10: 8
PAINLEVEL_OUTOF10: 6
PAINLEVEL_OUTOF10: 4
PAINLEVEL_OUTOF10: 3
PAINLEVEL_OUTOF10: 4

## 2024-04-03 ASSESSMENT — PAIN - FUNCTIONAL ASSESSMENT
PAIN_FUNCTIONAL_ASSESSMENT: 0-10
PAIN_FUNCTIONAL_ASSESSMENT: UNABLE TO SELF-REPORT

## 2024-04-03 ASSESSMENT — PAIN DESCRIPTION - LOCATION: LOCATION: KNEE

## 2024-04-03 ASSESSMENT — ACTIVITIES OF DAILY LIVING (ADL): HOME_MANAGEMENT_TIME_ENTRY: 57

## 2024-04-03 NOTE — NURSING NOTE
Agree with previous assessment. Patient awake resting in bed watching TV. Bed alarm on. Call light within reach. Will continue plan of care.

## 2024-04-03 NOTE — PROGRESS NOTES
"Physical Therapy    Physical Therapy Treatment    Patient Name: Qamar Mckinney \"Payton"  MRN: [id number]  Today's Date: 4/3/2024  Time Calculation  Start Time: 1339  Stop Time: 1425  Time Calculation (min): 46 min       Assessment/Plan   PT Assessment  PT Assessment Results: Decreased strength, Decreased range of motion, Decreased endurance, Impaired balance, Decreased mobility, Decreased safety awareness, Impaired vision, Decreased skin integrity, Orthopedic restrictions, Pain  Rehab Prognosis: Good  Evaluation/Treatment Tolerance: Treatment limited secondary to medical complications (Comment) (Lightheadedness.)  Medical Staff Made Aware: Yes  End of Session Communication: Bedside nurse  Assessment Comment: Pt still c/o a little lightheadedness when up but tolerated increased activity this visit. Pt will need to trial stairs in a.m.  End of Session Patient Position: Up in chair, Alarm on     PT Plan  Treatment/Interventions: Bed mobility, Transfer training, Gait training, Therapeutic exercise  PT Plan: Skilled PT  PT Frequency: BID  PT Discharge Recommendations: Low intensity level of continued care  Equipment Recommended upon Discharge: Wheeled walker  PT Recommended Transfer Status: Assist x1, Assistive device  PT - OK to Discharge: Yes      General Visit Information:   PT  Visit  PT Received On: 04/03/24  General  Prior to Session Communication: Bedside nurse  Patient Position Received: Bed, 2 rail up, Alarm on  Preferred Learning Style: verbal  General Comment: Pt agreeable to therapy.    Subjective   Precautions:  Precautions  Hearing/Visual Limitations: glasses  LE Weight Bearing Status: Weight Bearing as Tolerated  Medical Precautions: Fall precautions  Post-Surgical Precautions: Right total knee precautions  Vital Signs:       Objective   Pain:  Pain Assessment  Pain Assessment: 0-10  Pain Score: 4  Pain Type: Surgical pain  Pain Location: Knee  Pain Orientation: Right  Pain Interventions: Cold " "pack  Cognition:  Cognition  Overall Cognitive Status: Within Functional Limits  Orientation Level: Oriented X4  Postural Control:     Extremity/Trunk Assessments:    Activity Tolerance:  Activity Tolerance  Endurance: Decreased tolerance for upright activites  Treatments:  Therapeutic Exercise  Therapeutic Exercise Activity 1: Pt performed seated bilat LE ankle pumps/heel raises, LAQ with assist right, hip flexion with assist right, wally hip adduction, resisted hip abduction 2x15 reps each. Seated heel slides right 2x15. Therex performed sitting edge of bed. Pt reported just a little lightheadedness sitting. \"Nothing like this morning\"    Bed Mobility 1  Bed Mobility 1: Supine to sitting  Level of Assistance 1: Close supervision  Bed Mobility Comments 1: Pt using bilat UE's to move right LE off bed.  Bed Mobility 2  Bed Mobility  2: Sitting to supine  Level of Assistance 2: Minimum assistance  Bed Mobility Comments 2: Min assist with right LE onto bed sit to supine. x2 sit to supine trials during visit.    Ambulation/Gait Training 1  Surface 1: Level tile  Device 1: Rolling walker  Gait Support Devices: Wheelchair follow  Assistance 1: Contact guard  Comments/Distance (ft) 1: Pt ambulated with RW ~50' x2 CGA and w/c follow for safety. Pt initially demonstrating step to gait pattern, able to progress to reciprocal gait with verbal cues and distances. Decreased bilat step length. No right knee buckling. No reports of increased lightheadedness.  Transfer 1  Transfer From 1: Bed to  Transfer to 1: Stand  Technique 1: Sit to stand  Transfer Device 1: Walker  Transfer Level of Assistance 1: Contact guard  Trials/Comments 1: Verbal cues for safe hand placement. x2 sit to stand trials.  Transfers 2  Transfer From 2: Stand to  Transfer to 2: Sit, Chair with arms  Technique 2: Stand to sit  Transfer Level of Assistance 2: Close supervision  Trials/Comments 2: Verbal cues for safe hand placement. x2 stand to sit " trials.    Outcome Measures:  [facility] Basic Mobility  Turning from your back to your side while in a flat bed without using bedrails: A little  Moving from lying on your back to sitting on the side of a flat bed without using bedrails: A little  Moving to and from bed to chair (including a wheelchair): A little  Standing up from a chair using your arms (e.g. wheelchair or bedside chair): A little  To walk in hospital room: A little  Climbing 3-5 steps with railing: A lot  Basic Mobility - Total Score: 17    Education Documentation  Handouts, taught by Katelyn Longoria PTA at 4/3/2024  3:46 PM.  Learner: Patient  Readiness: Acceptance  Method: Explanation  Response: Verbalizes Understanding    Precautions, taught by Katelyn Longoria PTA at 4/3/2024  3:46 PM.  Learner: Patient  Readiness: Acceptance  Method: Explanation  Response: Verbalizes Understanding    Body Mechanics, taught by Katelyn Longoria PTA at 4/3/2024  3:46 PM.  Learner: Patient  Readiness: Acceptance  Method: Explanation  Response: Verbalizes Understanding    Home Exercise Program, taught by Katelyn Longoria PTA at 4/3/2024  3:46 PM.  Learner: Patient  Readiness: Acceptance  Method: Explanation  Response: Verbalizes Understanding    Mobility Training, taught by Katelyn Longoria PTA at 4/3/2024  3:46 PM.  Learner: Patient  Readiness: Acceptance  Method: Explanation  Response: Verbalizes Understanding    Education Comments  No comments found.        OP EDUCATION:       Encounter Problems       Encounter Problems (Active)       Balance       LTG - Patient will maintain balance to allow for safe mobility (Progressing)       Start:  04/02/24    Expected End:  04/04/24               Mobility       STG - Patient will ambulate 100' w/ supervision and RW (Progressing)       Start:  04/02/24    Expected End:  04/04/24            STG - Patient will ascend and descend four stairs w/ 1 rail, cane and min assist (Progressing)       Start:  04/02/24    Expected End:  04/04/24             Pt will demonstrate good comprehension and performance pf TKA HEP (Progressing)       Start:  04/02/24    Expected End:  04/04/24               PT Transfers       STG - Patient to transfer to and from sit to supine IND (Progressing)       Start:  04/02/24    Expected End:  04/04/24            STG - Patient will transfer sit to and from stand w/ distant supervision  (Progressing)       Start:  04/02/24    Expected End:  04/04/24               Safety       Pt will demonstrate comprehension and compliance of TKA precautions (Progressing)       Start:  04/02/24    Expected End:  04/04/24

## 2024-04-03 NOTE — PROGRESS NOTES
Spiritual Care Visit    Clinical Encounter Type  Visited With: Patient  Routine Visit: Introduction  Continue Visiting: Yes         Values/Beliefs  Spiritual Requests During Hospitalization: Anointing & Communion today. Great conversaton too!    Sacramental Encounters  Communion: Patient wants communion  Communion Given Indicator: Yes  Sacrament of Sick-Anointing: Anointed     Cruzito Chávez

## 2024-04-03 NOTE — PROGRESS NOTES
"Occupational Therapy    OT Treatment    Patient Name: Qamar Mckinney \"Toni\"  MRN: [id number]  Today's Date: 4/3/2024  Time Calculation  Start Time: 0715  Stop Time: 0812  Time Calculation (min): 57 min         Assessment:  OT Assessment: Pt initially needing Min A with transfers and mobility; however, once pt in chair, he reports dizziness. /59. Pt continues to report dizziness getting worse and wanting to get back to bed; however, repeating stating, \" not yet, wait a minute\". Once pt back in bed, /57. Nurse in room and made aware of pt symptoms. Cold compress applied. Pt states feeling betther. Therapist assists with ordering breakfast.  Evaluation/Treatment Tolerance: Treatment limited secondary to medical complications (Comment)  Medical Staff Made Aware: Yes  End of Session Communication: Bedside nurse  End of Session Patient Position: Bed, 3 rail up, Alarm on (all needs in reach.)  OT Assessment Results: Decreased endurance, Decreased ADL status, Decreased functional mobility  Evaluation/Treatment Tolerance: Treatment limited secondary to medical complications (Comment)  Medical Staff Made Aware: Yes  Plan:  Treatment Interventions: ADL retraining, Functional transfer training  OT Frequency: 4 times per week  OT Discharge Recommendations: Low intensity level of continued care  Equipment Recommended upon Discharge: Wheeled walker  Treatment Interventions: ADL retraining, Functional transfer training    Subjective   Previous Visit Info:  OT Last Visit  OT Received On: 04/03/24  General:  General  Reason for Referral: recent surgery  Referred By: Dr. Silva  Past Medical History Relevant to Rehab: a-fib, HTN, BPH, CHF, TIA, essential tremor, PMR, OA ag. knees, macular degenertion  Prior to Session Communication: Bedside nurse  Patient Position Received: Bed, 2 rail up, Alarm on  General Comment: Pt agreeable to treatment.  Precautions:  Hearing/Visual Limitations: glasses  LE Weight Bearing Status: " "Weight Bearing as Tolerated  Medical Precautions: Fall precautions  Post-Surgical Precautions: Right total knee precautions  Precautions Comment: Knee precautions reviewed with pt, therapist educates in correct form for RLE elevation  Pain:  Pain Assessment  Pain Assessment: 0-10  Pain Score: 5 - Moderate pain (5 with mobility, 2 in bed)  Pain Type: Surgical pain  Pain Location: Knee  Pain Orientation: Right    Objective    Cognition:  Cognition  Overall Cognitive Status: Within Functional Limits  Attention: Exceptions to WFL (Pt very talkative, perservating on having hiccups, needs frequent redirection to focus on therapy.)  Activities of Daily Living: Grooming  Grooming Level of Assistance: Close supervision  Grooming Where Assessed: Standing sinkside  Grooming Comments: hand hygiene, oral care  Functional Standing Tolerance:  Time: 5 minutes  Activity: self care  Functional Standing Tolerance Comments: fait balance  Bed Mobility/Transfers: Bed Mobility 1  Bed Mobility 1: Supine to sitting  Level of Assistance 1: Close supervision  Bed Mobility Comments 1: bed flat, additonal time/effort needed, cues for adaptive stratagies to advance RLE over bed surface  Bed Mobility 2  Bed Mobility  2: Sitting to supine  Level of Assistance 2: Moderate assistance  Bed Mobility Comments 2: assist with BLE over bed surface due to dizziness    Transfer 1  Transfer From 1: Bed to  Transfer to 1: Chair with arms  Technique 1: To left  Transfer Device 1: Walker  Transfer Level of Assistance 1: Minimum assistance  Trials/Comments 1: cues for hand placement, advancing RLE forward prior to sit.  Transfers 2  Transfer From 2: Chair with arms to  Transfer to 2: Bed  Technique 2: Sit to stand, Stand to sit, To left  Transfer Device 2: Walker  Transfer Level of Assistance 2: Maximum assistance, +2  Trials/Comments 2: 1st trial with Mod A x1, pt unable to achieve upright standing dispite elevated seat height (pt 6' 5\") 2nd trial with Mod A " x2, pt able to acheive partial stand but unable to transition BUE from chair to walker. 3rd trial pt needing Max A x2 for stand pivot transfer to bed.      Functional Mobility:  Functional Mobility 1  Surface 1: Level tile  Device 1: Rolling walker  Assistance 1: Minimum assistance  Quality of Functional Mobility 1: Inconsistent stride length (cues for step sequencing forward/backward, safe proximity of self to device.)  Comments 1: 12 feet x2  Outcome Measures:[facility] Daily Activity  Putting on and taking off regular lower body clothing: A little  Bathing (including washing, rinsing, drying): A little  Putting on and taking off regular upper body clothing: A little  Toileting, which includes using toilet, bedpan or urinal: A little  Taking care of personal grooming such as brushing teeth: None  Eating Meals: None  Daily Activity - Total Score: 20        Education Documentation  Precautions, taught by ALEXANDRA Alvares at 4/3/2024 11:00 AM.  Learner: Patient  Readiness: Acceptance  Method: Explanation, Demonstration  Response: Verbalizes Understanding, Demonstrated Understanding, Needs Reinforcement    ADL Training, taught by ALEXANDRA Alvares at 4/3/2024 11:00 AM.  Learner: Patient  Readiness: Acceptance  Method: Explanation, Demonstration  Response: Verbalizes Understanding, Demonstrated Understanding, Needs Reinforcement    Education Comments  No comments found.    IP EDUCATION:  Education  Individual(s) Educated: Patient  Education Provided: Fall precautons (safe transfer techniques, safe walker use)  Patient/Caregiver Demonstrated Understanding: yes  Plan of Care Discussed and Agreed Upon: yes  Patient Response to Education: Patient/Caregiver Verbalized Understanding of Information    Goals:  Encounter Problems       Encounter Problems (Active)       OT Goals       ADL's (Progressing)       Start:  04/02/24    Expected End:  04/16/24       Pt will complete bathing, dressing and grooming tasks w/ mod I w/ Ae/  compe nsagtory tech as need for ease, safety and increased independence in self care tasks         Functional transfers (Progressing)       Start:  04/02/24    Expected End:  04/16/24       Pt will demon functional transfers w/ mod I w/ LRD for safety and increased independence in functional transfers         Precautions (Progressing)       Start:  04/02/24    Expected End:  04/16/24       Pt will verbalize/ demon surgical precautions consistently for safety and increased independence in daily tasks and functional mobility.,

## 2024-04-03 NOTE — PROGRESS NOTES
"Toni Mckinney is a 83 y.o. male on day 1 of admission presenting with S/P total knee arthroplasty, right.    Subjective   Patient seen today.  Worked with physical therapy.  Developed with lightheadedness with therapy.  Plan to work with therapy again this afternoon.  He did fail his voiding trial.       Objective     Physical Exam  Lower extremity: Postop dressing clean dry and intact.  No calf tenderness neurovascular intact distally  Last Recorded Vitals  Blood pressure (!) 68/29, pulse 85, temperature 36.9 °C (98.4 °F), temperature source Temporal, resp. rate 18, height 1.956 m (6' 5\"), weight 93 kg (205 lb), SpO2 95 %.  Intake/Output last 3 Shifts:  I/O last 3 completed shifts:  In: 4510 (48.5 mL/kg) [P.O.:2420; I.V.:1790 (19.3 mL/kg); IV Piggyback:300]  Out: 7050 (75.8 mL/kg) [Urine:7050 (2.1 mL/kg/hr)]  Weight: 93 kg     Relevant Results      Scheduled medications  apixaban, 5 mg, oral, BID  atorvastatin, 10 mg, oral, Daily  digoxin, 125 mcg, oral, Daily  metoprolol tartrate, 25 mg, oral, BID  polyethylene glycol, 17 g, oral, Daily  tamsulosin, 0.4 mg, oral, Daily      Continuous medications  oxygen, 2 L/min      PRN medications  PRN medications: acetaminophen, HYDROcodone-acetaminophen, HYDROcodone-acetaminophen, metoprolol, naloxone, naloxone, naloxone, ondansetron ODT **OR** ondansetron  Results for orders placed or performed during the hospital encounter of 04/01/24 (from the past 24 hour(s))   CBC   Result Value Ref Range    WBC 15.1 (H) 4.4 - 11.3 x10*3/uL    nRBC 0.0 0.0 - 0.0 /100 WBCs    RBC 4.24 (L) 4.50 - 5.90 x10*6/uL    Hemoglobin 12.7 (L) 13.5 - 17.5 g/dL    Hematocrit 38.4 (L) 41.0 - 52.0 %    MCV 91 80 - 100 fL    MCH 30.0 26.0 - 34.0 pg    MCHC 33.1 32.0 - 36.0 g/dL    RDW 13.0 11.5 - 14.5 %    Platelets 232 150 - 450 x10*3/uL   Basic metabolic panel   Result Value Ref Range    Glucose 122 (H) 65 - 99 mg/dL    Sodium 133 133 - 145 mmol/L    Potassium 4.2 3.4 - 5.1 mmol/L    Chloride 100 97 - " 107 mmol/L    Bicarbonate 24 24 - 31 mmol/L    Urea Nitrogen 13 8 - 25 mg/dL    Creatinine 0.90 0.40 - 1.60 mg/dL    eGFR 85 >60 mL/min/1.73m*2    Calcium 8.9 8.5 - 10.4 mg/dL    Anion Gap 9 <=19 mmol/L                            Assessment/Plan   Principal Problem:    S/P total knee arthroplasty, right    Postop day 2 from right total knee arthroplasty.  Plan for physical therapy for mobilization and strengthening.  Home DVT prophylaxis.  We will try 1 more voiding trial for 4/4/2020 4 in the morning.  If he fails he will go home with a Ortega and follow-up with urology in 5 days.  Plan for discharge home 4/4/2024             Yonis Sivla MD

## 2024-04-03 NOTE — CARE PLAN
The patient's goals for the shift include pain management and mange aquino catheter.     The clinical goals for the shift include pain management, safety awareness, increase in mobility/activity tolerance, work with PT/OT, OOB to chair for meals, comfort, and monitor labs/VS.    Problem: Pain  Goal: My pain/discomfort is manageable  4/3/2024 1029 by Rut Lake RN  Outcome: Progressing  4/3/2024 1029 by Rut Lake RN  Outcome: Progressing     Problem: Safety  Goal: Patient will be injury free during hospitalization  4/3/2024 1029 by Rut Lake RN  Outcome: Progressing  4/3/2024 1029 by Rut Lake RN  Outcome: Progressing  Goal: I will remain free of falls  4/3/2024 1029 by Rut Lake RN  Outcome: Progressing  4/3/2024 1029 by Rut Lake RN  Outcome: Progressing     Problem: Daily Care  Goal: Daily care needs are met  4/3/2024 1029 by Rut Lake RN  Outcome: Progressing  4/3/2024 1029 by Rut Lake RN  Outcome: Progressing     Problem: Psychosocial Needs  Goal: Demonstrates ability to cope with hospitalization/illness  4/3/2024 1029 by Rut Lake RN  Outcome: Progressing  4/3/2024 1029 by Rut Lake RN  Outcome: Progressing  Goal: Collaborate with me, my family, and caregiver to identify my specific goals  4/3/2024 1029 by Rut Lake RN  Outcome: Progressing  4/3/2024 1029 by Rut Lake RN  Outcome: Progressing     Problem: Fall/Injury  Goal: Not fall by end of shift  4/3/2024 1029 by Rut Lake RN  Outcome: Progressing  4/3/2024 1029 by Rut Lake RN  Outcome: Progressing  Goal: Be free from injury by end of the shift  4/3/2024 1029 by Rut Lake RN  Outcome: Progressing  4/3/2024 1029 by Rut Lake RN  Outcome: Progressing  Goal: Verbalize understanding of personal risk factors for fall in the hospital  4/3/2024 1029 by Rut Lake RN  Outcome: Progressing  4/3/2024 1029 by Rut Lake, RN  Outcome: Progressing  Goal: Verbalize  understanding of risk factor reduction measures to prevent injury from fall in the home  4/3/2024 1029 by Rut Lake RN  Outcome: Progressing  4/3/2024 1029 by Rut Lake RN  Outcome: Progressing  Goal: Use assistive devices by end of the shift  4/3/2024 1029 by Rut Lake RN  Outcome: Progressing  4/3/2024 1029 by Rut Lake RN  Outcome: Progressing  Goal: Pace activities to prevent fatigue by end of the shift  4/3/2024 1029 by Rut Lake RN  Outcome: Progressing  4/3/2024 1029 by Rut Lake RN  Outcome: Progressing     Problem: Deep Vein Thrombosis  Goal: I will remain free from complications of deep vein thrombosis and maintain current level of mobility  Outcome: Progressing     Problem: Indwelling Catheter Maintenance  Goal: I will have no complications from indwelling catheter  Outcome: Progressing     Problem: Pain  Goal: Turns in bed with improved pain control throughout the shift  4/3/2024 1029 by Rut Lake RN  Outcome: Progressing  4/3/2024 1029 by Rut Lake RN  Outcome: Progressing     Problem: Pain  Goal: Walks with improved pain control throughout the shift  4/3/2024 1029 by Rut Lake RN  Outcome: Progressing  4/3/2024 1029 by Rut Lake RN  Outcome: Progressing     Problem: Pain  Goal: Performs ADL's with improved pain control throughout shift  4/3/2024 1029 by Rut Lake RN  Outcome: Progressing  4/3/2024 1029 by Rut Lake RN  Outcome: Progressing     Problem: Pain  Goal: Participates in PT with improved pain control throughout the shift  4/3/2024 1029 by Rut Lake RN  Outcome: Progressing  4/3/2024 1029 by Rut Lake RN  Outcome: Progressing     Problem: Pain  Goal: Free from opioid side effects throughout the shift  4/3/2024 1029 by Rut Lake RN  Outcome: Progressing  4/3/2024 1029 by Rut Lake RN  Outcome: Progressing     Problem: Discharge Barriers  Goal: My discharge needs are met  4/3/2024 1029 by Rut  KAMRON Lake  Outcome: Progressing  4/3/2024 1029 by Rut Lake RN  Outcome: Progressing

## 2024-04-03 NOTE — SIGNIFICANT EVENT
Dr. Silva and Candida Bunch informed of the following via secure chat. Patient c/o of feeling dizzy/light headed again. Orthostatic VS were done: lying 116/59  Sittin/58  and Standin/29 HR 85. Physical therapist has concerns with patient going home today, Kateyln from PT states he was doing much better yesterday compared to today. They said they will work with him in afternoon and go from there.

## 2024-04-03 NOTE — PROGRESS NOTES
04/03/24 0837   Discharge Planning   Patient expects to be discharged to: [facility]   Does the patient need discharge transport arranged? No       DC with [facility] at discharge.  Urinary retention will discharge with aquino.

## 2024-04-03 NOTE — PROGRESS NOTES
"Sleeping soundly when entered, had aquino replaced after failed trial of void  Results for orders placed or performed during the hospital encounter of 04/01/24 (from the past 24 hour(s))   CBC   Result Value Ref Range    WBC 15.1 (H) 4.4 - 11.3 x10*3/uL    nRBC 0.0 0.0 - 0.0 /100 WBCs    RBC 4.24 (L) 4.50 - 5.90 x10*6/uL    Hemoglobin 12.7 (L) 13.5 - 17.5 g/dL    Hematocrit 38.4 (L) 41.0 - 52.0 %    MCV 91 80 - 100 fL    MCH 30.0 26.0 - 34.0 pg    MCHC 33.1 32.0 - 36.0 g/dL    RDW 13.0 11.5 - 14.5 %    Platelets 232 150 - 450 x10*3/uL   Basic metabolic panel   Result Value Ref Range    Glucose 122 (H) 65 - 99 mg/dL    Sodium 133 133 - 145 mmol/L    Potassium 4.2 3.4 - 5.1 mmol/L    Chloride 100 97 - 107 mmol/L    Bicarbonate 24 24 - 31 mmol/L    Urea Nitrogen 13 8 - 25 mg/dL    Creatinine 0.90 0.40 - 1.60 mg/dL    eGFR 85 >60 mL/min/1.73m*2    Calcium 8.9 8.5 - 10.4 mg/dL    Anion Gap 9 <=19 mmol/L      Blood pressure 120/61, pulse 102, temperature 37.1 °C (98.8 °F), temperature source Temporal, resp. rate 15, height 1.956 m (6' 5\"), weight 93 kg (205 lb), SpO2 97 %.     PE:   Sleeping comfortabley  urine is dilute and clear in aquino    Trial of void per Dr Frames 4/4 again  "

## 2024-04-03 NOTE — PROGRESS NOTES
"Physical Therapy    Physical Therapy Treatment    Patient Name: Qamar Mckinney \"Toni\"  MRN: [id number]  Today's Date: 4/3/2024  Time Calculation  Start Time: 1043  Stop Time: 1125  Time Calculation (min): 42 min       Assessment/Plan   PT Assessment  PT Assessment Results: Decreased strength, Decreased range of motion, Decreased endurance, Impaired balance, Decreased mobility, Decreased safety awareness, Impaired vision, Decreased skin integrity, Orthopedic restrictions, Pain  Rehab Prognosis: Good  Evaluation/Treatment Tolerance: Treatment limited secondary to medical complications (Comment) (Lightheadedness.)  Medical Staff Made Aware: Yes  End of Session Communication: Bedside nurse  Assessment Comment: Mobility and tolerance to activity limited today due to c/o lightheadedness and \"just not feeling right\" per pt. BP supine 116/59, sitting edge of bed 109/58 and standing 68/29--questionable reading however unable to get second BP reading in standing.  End of Session Patient Position: Bed, 3 rail up, Alarm on     PT Plan  Treatment/Interventions: Bed mobility, Transfer training, Gait training, Therapeutic exercise  PT Plan: Skilled PT  PT Frequency: BID  PT Discharge Recommendations: Low intensity level of continued care  Equipment Recommended upon Discharge: Wheeled walker  PT Recommended Transfer Status: Assist x1, Assistive device  PT - OK to Discharge: Yes      General Visit Information:   PT  Visit  PT Received On: 04/03/24  General  Prior to Session Communication: Bedside nurse  Patient Position Received: Bed, 2 rail up, Alarm on  Preferred Learning Style: verbal  General Comment: Pt agreeable to treatment. Cleared by nurse. Pt had episode of getting lightheaded  this morning with OT.    Subjective   Precautions:  Precautions  Hearing/Visual Limitations: glasses  LE Weight Bearing Status: Weight Bearing as Tolerated  Medical Precautions: Fall precautions  Post-Surgical Precautions: Right total knee " precautions  Vital Signs:       Objective   Pain:  Pain Assessment  Pain Assessment: 0-10  Pain Score: 8  Pain Type: Surgical pain  Pain Location: Knee  Pain Orientation: Right  Pain Interventions: Cold pack  Cognition:  Cognition  Overall Cognitive Status: Within Functional Limits  Orientation Level: Oriented X4  Postural Control:     Extremity/Trunk Assessments:    Activity Tolerance:  Activity Tolerance  Endurance: Decreased tolerance for upright activites  Treatments:  Therapeutic Exercise  Therapeutic Exercise Performed: Yes  Therapeutic Exercise Activity 1: Pt performed seated bilat LE ankle pumps/heel raises, LAQ with assist right, hip flexion with assist right, wally hip adduction, resisted hip abduction x15 reps each. Seated heel slides right x15. Therex performed sitting edge of bed. Pt c/o lightheadedness sitting, not resolving.    Bed Mobility 1  Bed Mobility 1: Supine to sitting  Level of Assistance 1: Close supervision  Bed Mobility Comments 1: x2 during visit due to pt needing to use bedpan, feeling too lightheaded to get to BSC.  Bed Mobility 2  Bed Mobility  2: Sitting to supine  Level of Assistance 2: Minimum assistance  Bed Mobility Comments 2: Min assist with right LE onto bed sit to supine. x2 sit to supine trials during visit.    Ambulation/Gait Training 1  Surface 1: Level tile  Device 1: Rolling walker  Assistance 1: Contact guard  Comments/Distance (ft) 1: Pt used RW to take ~6 lateral steps towards head of bed, CGA.  Transfer 1  Transfer From 1: Bed to  Transfer to 1: Stand  Technique 1: Sit to stand  Transfer Device 1: Walker  Transfer Level of Assistance 1: Minimum assistance  Trials/Comments 1: Verbal cues for safe hand placement. x2 sit to stand trials.  Transfers 2  Transfer From 2: Stand to  Transfer to 2: Sit, Bed  Technique 2: Stand to sit  Transfer Level of Assistance 2: Contact guard  Trials/Comments 2: Verbal cues for safe hand placement. x2 stand to sit trials.    Outcome  Measures:  [facility] Basic Mobility  Turning from your back to your side while in a flat bed without using bedrails: A little  Moving from lying on your back to sitting on the side of a flat bed without using bedrails: A little  Moving to and from bed to chair (including a wheelchair): A little  Standing up from a chair using your arms (e.g. wheelchair or bedside chair): A little  To walk in hospital room: A little  Climbing 3-5 steps with railing: A lot  Basic Mobility - Total Score: 17    Education Documentation  Handouts, taught by Katelyn Longoria PTA at 4/3/2024 11:40 AM.  Learner: Patient  Readiness: Acceptance  Method: Explanation  Response: Verbalizes Understanding    Precautions, taught by Katelyn Longoria PTA at 4/3/2024 11:40 AM.  Learner: Patient  Readiness: Acceptance  Method: Explanation  Response: Verbalizes Understanding    Body Mechanics, taught by Katelyn Longoria PTA at 4/3/2024 11:40 AM.  Learner: Patient  Readiness: Acceptance  Method: Explanation  Response: Verbalizes Understanding    Home Exercise Program, taught by Katelyn Longoria PTA at 4/3/2024 11:40 AM.  Learner: Patient  Readiness: Acceptance  Method: Explanation  Response: Verbalizes Understanding    Mobility Training, taught by Katelyn Longoria PTA at 4/3/2024 11:40 AM.  Learner: Patient  Readiness: Acceptance  Method: Explanation  Response: Verbalizes Understanding    Education Comments  No comments found.        OP EDUCATION:  Outpatient Education  Individual(s) Educated: Patient  Education Provided: Home Exercise Program, Post-Op Precautions  Equipment: Thera-Band    Encounter Problems       Encounter Problems (Active)       Balance       LTG - Patient will maintain balance to allow for safe mobility (Progressing)       Start:  04/02/24    Expected End:  04/04/24               Mobility       STG - Patient will ambulate 100' w/ supervision and RW (Progressing)       Start:  04/02/24    Expected End:  04/04/24            STG - Patient will ascend and  descend four stairs w/ 1 rail, cane and min assist (Progressing)       Start:  04/02/24    Expected End:  04/04/24            Pt will demonstrate good comprehension and performance pf TKA HEP (Progressing)       Start:  04/02/24    Expected End:  04/04/24               PT Transfers       STG - Patient to transfer to and from sit to supine IND (Progressing)       Start:  04/02/24    Expected End:  04/04/24            STG - Patient will transfer sit to and from stand w/ distant supervision  (Progressing)       Start:  04/02/24    Expected End:  04/04/24               Safety       Pt will demonstrate comprehension and compliance of TKA precautions (Progressing)       Start:  04/02/24    Expected End:  04/04/24

## 2024-04-03 NOTE — PROGRESS NOTES
"Toni Mckinney is a 83 y.o. male on day 1 of admission presenting with S/P total knee arthroplasty, right.    Subjective   Pt seen and examined.  Pt did trial void yesterday and failed.  Denies any chest pain, palpitations, dyspnea.        Objective     Physical Exam    Last Recorded Vitals  Blood pressure 100/59, pulse 100, temperature 36.7 °C (98.1 °F), temperature source Temporal, resp. rate 16, height 1.956 m (6' 5\"), weight 93 kg (205 lb), SpO2 96 %.  Intake/Output last 3 Shifts:  I/O last 3 completed shifts:  In: 4510 (48.5 mL/kg) [P.O.:2420; I.V.:1790 (19.3 mL/kg); IV Piggyback:300]  Out: 7050 (75.8 mL/kg) [Urine:7050 (2.1 mL/kg/hr)]  Weight: 93 kg     Relevant Results  Scheduled medications  apixaban, 5 mg, oral, BID  atorvastatin, 10 mg, oral, Daily  digoxin, 125 mcg, oral, Daily  metoprolol tartrate, 25 mg, oral, BID  polyethylene glycol, 17 g, oral, Daily  tamsulosin, 0.4 mg, oral, Daily      Continuous medications  oxygen, 2 L/min      PRN medications  PRN medications: acetaminophen, HYDROcodone-acetaminophen, HYDROcodone-acetaminophen, metoprolol, naloxone, naloxone, naloxone, ondansetron ODT **OR** ondansetron     following data reviewed    WBC- 15.1  Hgb-12.7  Hct-38.4  Platelets-232        Na-133  K+-4.2  Cl-100  Bicarb-24  BUN-13  creatinine-0.9                        Assessment/Plan   Principal Problem:    S/P total knee arthroplasty, right      RT TKA  -pain management  -PT, OT     HTN  -continue BB with parameters  -monitor blood pressure     Chronic Afib with RVR  -telemetry  -rate control  -continue BB  -continue Eliquis  -Cardiology signed off  -digoxin     Hyperlipidemia  -Continue statin     Urinary retention  -Ortega catheter  -Failed Trial void , follow up in urology office 5-7 days     Plan   Above plan discussed with pt and he is in agreement  Pt stable for discharge from medical standpoint    Candida Bunch, APRN-CNP      "

## 2024-04-03 NOTE — PROGRESS NOTES
"Toni Mckinney is a 83 y.o. male on day 1 of admission presenting with S/P total knee arthroplasty, right.    Subjective   Patient states he feels well this morning.  Ortega catheter was reinserted yesterday.  He is with the physical therapy this morning.  No palpitations.       Objective     Physical Exam  Eyes:      Conjunctiva/sclera: Conjunctivae normal.   Cardiovascular:      Rate and Rhythm: Normal rate. Rhythm irregularly irregular.      Heart sounds:      No gallop.   Pulmonary:      Breath sounds: Normal breath sounds. No wheezing, rhonchi or rales.   Abdominal:      Palpations: Abdomen is soft.   Neurological:      General: No focal deficit present.      Mental Status: He is alert.       Constitutional:       Appearance: Not in distress.   Eyes:      Conjunctiva/sclera: Conjunctivae normal.   Neck:      Vascular: JVD normal.   Pulmonary:      Breath sounds: Normal breath sounds. No wheezing. No rhonchi. No rales.   Cardiovascular:      Normal rate. Irregularly irregular rhythm.      Murmurs: There is no murmur.      No gallop.  No click. No rub.   Abdominal:      Palpations: Abdomen is soft.   Neurological:      General: No focal deficit present.      Mental Status: Alert.        Last Recorded Vitals  Blood pressure 125/80, pulse 82, temperature 36.7 °C (98.1 °F), temperature source Temporal, resp. rate 16, height 1.956 m (6' 5\"), weight 93 kg (205 lb), SpO2 97 %.  Intake/Output last 3 Shifts:  I/O last 3 completed shifts:  In: 4510 (48.5 mL/kg) [P.O.:2420; I.V.:1790 (19.3 mL/kg); IV Piggyback:300]  Out: 7050 (75.8 mL/kg) [Urine:7050 (2.1 mL/kg/hr)]  Weight: 93 kg     Relevant Results                             Assessment/Plan   Principal Problem:    S/P total knee arthroplasty, right    S/P total knee arthroplasty April 1, 2024  Longstanding persistent atrial fibrillation  Essential hypertension  History of TIA     4/2: Patient underwent a total knee arthroplasty procedure yesterday and is making a good " recovery thus far.  His heart rate has been somewhat elevated and the hospitalist service has been reluctant to change medications given his lower blood pressure.  At this point we will continue the metoprolol tartrate to 25 mg twice daily and add digoxin as alternative rate modulating medication with no blood pressure effects.  Will also continue the patient's apixaban for stroke prevention.  Hemoglobin is stable at 12.5 on morning laboratory studies.  Will make these changes and follow with you and hopefully the patient will recover quickly.     4/3: Heart rate is better controlled with the addition of digoxin.  Blood pressure is also improved over the last 24 hours as well.  At this time we will continue the combination of the low-dose metoprolol to tartrate 25 mg twice daily and low-dose digoxin 125 mcg once daily.  Eliquis has been restarted for stroke prevention as well.  Patient is okay for discharge home and will have him follow-up with Dr. Castaneda as an outpatient.       I spent 20 minutes in the professional and overall care of this patient.      Primitivo Gaming DO

## 2024-04-03 NOTE — NURSING NOTE
Agree with previous assessment. Patient awake OOB sitting in chair watching TV. Call light within reach. Requesting pain med for right knee pain 7/10. Will continue plan of care.

## 2024-04-04 ENCOUNTER — TELEPHONE (OUTPATIENT)
Dept: PRIMARY CARE | Facility: CLINIC | Age: 84
End: 2024-04-04
Payer: MEDICARE

## 2024-04-04 LAB
APPEARANCE UR: CLEAR
ATRIAL RATE: 174 BPM
BILIRUB UR STRIP.AUTO-MCNC: NEGATIVE MG/DL
COLOR UR: COLORLESS
GLUCOSE UR STRIP.AUTO-MCNC: NORMAL MG/DL
KETONES UR STRIP.AUTO-MCNC: NEGATIVE MG/DL
LEUKOCYTE ESTERASE UR QL STRIP.AUTO: NEGATIVE
NITRITE UR QL STRIP.AUTO: NEGATIVE
PH UR STRIP.AUTO: 6 [PH]
PROT UR STRIP.AUTO-MCNC: NEGATIVE MG/DL
Q ONSET: 224 MS
QRS COUNT: 22 BEATS
QRS DURATION: 108 MS
QT INTERVAL: 342 MS
QTC CALCULATION(BAZETT): 505 MS
QTC FREDERICIA: 444 MS
R AXIS: -37 DEGREES
RBC # UR STRIP.AUTO: NEGATIVE /UL
SP GR UR STRIP.AUTO: 1
T AXIS: 21 DEGREES
T OFFSET: 395 MS
UROBILINOGEN UR STRIP.AUTO-MCNC: NORMAL MG/DL
VENTRICULAR RATE: 131 BPM

## 2024-04-04 PROCEDURE — 97530 THERAPEUTIC ACTIVITIES: CPT | Mod: GP,CQ

## 2024-04-04 PROCEDURE — 97535 SELF CARE MNGMENT TRAINING: CPT | Mod: GO,CO

## 2024-04-04 PROCEDURE — 2500000004 HC RX 250 GENERAL PHARMACY W/ HCPCS (ALT 636 FOR OP/ED): Performed by: STUDENT IN AN ORGANIZED HEALTH CARE EDUCATION/TRAINING PROGRAM

## 2024-04-04 PROCEDURE — 97116 GAIT TRAINING THERAPY: CPT | Mod: GP,CQ

## 2024-04-04 PROCEDURE — 1200000002 HC GENERAL ROOM WITH TELEMETRY DAILY

## 2024-04-04 PROCEDURE — 81003 URINALYSIS AUTO W/O SCOPE: CPT | Performed by: NURSE PRACTITIONER

## 2024-04-04 PROCEDURE — 2500000001 HC RX 250 WO HCPCS SELF ADMINISTERED DRUGS (ALT 637 FOR MEDICARE OP): Performed by: NURSE PRACTITIONER

## 2024-04-04 PROCEDURE — 2500000001 HC RX 250 WO HCPCS SELF ADMINISTERED DRUGS (ALT 637 FOR MEDICARE OP): Performed by: STUDENT IN AN ORGANIZED HEALTH CARE EDUCATION/TRAINING PROGRAM

## 2024-04-04 PROCEDURE — 2500000001 HC RX 250 WO HCPCS SELF ADMINISTERED DRUGS (ALT 637 FOR MEDICARE OP): Performed by: INTERNAL MEDICINE

## 2024-04-04 PROCEDURE — 2500000004 HC RX 250 GENERAL PHARMACY W/ HCPCS (ALT 636 FOR OP/ED): Performed by: NURSE PRACTITIONER

## 2024-04-04 PROCEDURE — 97110 THERAPEUTIC EXERCISES: CPT | Mod: GP,CQ

## 2024-04-04 PROCEDURE — 99232 SBSQ HOSP IP/OBS MODERATE 35: CPT | Performed by: INTERNAL MEDICINE

## 2024-04-04 PROCEDURE — 2500000002 HC RX 250 W HCPCS SELF ADMINISTERED DRUGS (ALT 637 FOR MEDICARE OP, ALT 636 FOR OP/ED): Performed by: UROLOGY

## 2024-04-04 RX ADMIN — ACETAMINOPHEN 650 MG: 325 TABLET ORAL at 17:37

## 2024-04-04 RX ADMIN — DIGOXIN 125 MCG: 125 TABLET ORAL at 08:09

## 2024-04-04 RX ADMIN — HYDROCODONE BITARTRATE AND ACETAMINOPHEN 1 TABLET: 5; 325 TABLET ORAL at 08:08

## 2024-04-04 RX ADMIN — APIXABAN 5 MG: 5 TABLET, FILM COATED ORAL at 22:42

## 2024-04-04 RX ADMIN — TAMSULOSIN HYDROCHLORIDE 0.4 MG: 0.4 CAPSULE ORAL at 08:09

## 2024-04-04 RX ADMIN — ATORVASTATIN CALCIUM 10 MG: 10 TABLET, FILM COATED ORAL at 08:09

## 2024-04-04 RX ADMIN — SODIUM CHLORIDE 250 ML: 900 INJECTION, SOLUTION INTRAVENOUS at 09:26

## 2024-04-04 RX ADMIN — APIXABAN 5 MG: 5 TABLET, FILM COATED ORAL at 08:08

## 2024-04-04 RX ADMIN — POLYETHYLENE GLYCOL 3350 17 G: 17 POWDER, FOR SOLUTION ORAL at 08:10

## 2024-04-04 ASSESSMENT — COGNITIVE AND FUNCTIONAL STATUS - GENERAL
MOBILITY SCORE: 15
DRESSING REGULAR LOWER BODY CLOTHING: A LOT
MOVING TO AND FROM BED TO CHAIR: A LITTLE
MOVING TO AND FROM BED TO CHAIR: A LITTLE
CLIMB 3 TO 5 STEPS WITH RAILING: A LOT
HELP NEEDED FOR BATHING: A LOT
MOVING TO AND FROM BED TO CHAIR: A LITTLE
TOILETING: A LITTLE
TURNING FROM BACK TO SIDE WHILE IN FLAT BAD: A LITTLE
MOBILITY SCORE: 17
DAILY ACTIVITIY SCORE: 17
TURNING FROM BACK TO SIDE WHILE IN FLAT BAD: A LITTLE
HELP NEEDED FOR BATHING: A LITTLE
HELP NEEDED FOR BATHING: A LOT
DAILY ACTIVITIY SCORE: 20
DRESSING REGULAR LOWER BODY CLOTHING: A LOT
DRESSING REGULAR UPPER BODY CLOTHING: A LITTLE
WALKING IN HOSPITAL ROOM: A LITTLE
DAILY ACTIVITIY SCORE: 17
DRESSING REGULAR LOWER BODY CLOTHING: A LOT
WALKING IN HOSPITAL ROOM: A LITTLE
STANDING UP FROM CHAIR USING ARMS: A LOT
STANDING UP FROM CHAIR USING ARMS: A LOT
DRESSING REGULAR UPPER BODY CLOTHING: A LITTLE
CLIMB 3 TO 5 STEPS WITH RAILING: A LOT
STANDING UP FROM CHAIR USING ARMS: A LITTLE
DRESSING REGULAR UPPER BODY CLOTHING: A LITTLE
TOILETING: A LITTLE
DRESSING REGULAR UPPER BODY CLOTHING: A LITTLE
MOVING FROM LYING ON BACK TO SITTING ON SIDE OF FLAT BED WITH BEDRAILS: A LITTLE
MOBILITY SCORE: 16
MOVING FROM LYING ON BACK TO SITTING ON SIDE OF FLAT BED WITH BEDRAILS: A LITTLE
MOVING FROM LYING ON BACK TO SITTING ON SIDE OF FLAT BED WITH BEDRAILS: A LITTLE
STANDING UP FROM CHAIR USING ARMS: A LOT
TOILETING: A LOT
MOVING FROM LYING ON BACK TO SITTING ON SIDE OF FLAT BED WITH BEDRAILS: A LITTLE
TOILETING: A LOT
CLIMB 3 TO 5 STEPS WITH RAILING: A LOT
HELP NEEDED FOR BATHING: A LOT
CLIMB 3 TO 5 STEPS WITH RAILING: A LOT
MOBILITY SCORE: 16
TURNING FROM BACK TO SIDE WHILE IN FLAT BAD: A LITTLE
MOVING TO AND FROM BED TO CHAIR: A LITTLE
DRESSING REGULAR LOWER BODY CLOTHING: A LITTLE
WALKING IN HOSPITAL ROOM: A LOT
WALKING IN HOSPITAL ROOM: A LITTLE
DAILY ACTIVITIY SCORE: 18
TURNING FROM BACK TO SIDE WHILE IN FLAT BAD: A LITTLE

## 2024-04-04 ASSESSMENT — PAIN SCALES - GENERAL
PAINLEVEL_OUTOF10: 4
PAINLEVEL_OUTOF10: 0 - NO PAIN
PAINLEVEL_OUTOF10: 4
PAINLEVEL_OUTOF10: 6
PAINLEVEL_OUTOF10: 0 - NO PAIN
PAINLEVEL_OUTOF10: 4
PAINLEVEL_OUTOF10: 7
PAINLEVEL_OUTOF10: 4

## 2024-04-04 ASSESSMENT — PAIN - FUNCTIONAL ASSESSMENT
PAIN_FUNCTIONAL_ASSESSMENT: 0-10

## 2024-04-04 ASSESSMENT — PAIN DESCRIPTION - LOCATION: LOCATION: KNEE

## 2024-04-04 ASSESSMENT — PAIN DESCRIPTION - ORIENTATION: ORIENTATION: RIGHT

## 2024-04-04 NOTE — PROGRESS NOTES
"Toni Mckinney is a 83 y.o. male on day 1 of admission presenting with S/P total knee arthroplasty, right.    Subjective   Patient seen today.  Worked with physical therapy.  Developed some lightheadedness and with working with physical therapy today.  Plan for physical therapy this afternoon.    Objective     Physical Exam  Right lower extremity: Postop dressing clean dry and intact.  No calf tenderness neurovascular intact distally  Last Recorded Vitals  Blood pressure (!) 87/44, pulse 104, temperature 37.1 °C (98.8 °F), temperature source Temporal, resp. rate 18, height 1.956 m (6' 5\"), weight 93 kg (205 lb), SpO2 95 %.  Intake/Output last 3 Shifts:  I/O last 3 completed shifts:  In: 2470 (26.6 mL/kg) [P.O.:2470]  Out: 4750 (51.1 mL/kg) [Urine:4750 (1.4 mL/kg/hr)]  Weight: 93 kg     Relevant Results      Scheduled medications  apixaban, 5 mg, oral, BID  atorvastatin, 10 mg, oral, Daily  digoxin, 125 mcg, oral, Daily  metoprolol tartrate, 25 mg, oral, BID  polyethylene glycol, 17 g, oral, Daily  tamsulosin, 0.4 mg, oral, Daily      Continuous medications  oxygen, 2 L/min      PRN medications  PRN medications: acetaminophen, HYDROcodone-acetaminophen, HYDROcodone-acetaminophen, metoprolol, naloxone, naloxone, naloxone, ondansetron ODT **OR** ondansetron  No results found for this or any previous visit (from the past 24 hour(s)).                           Assessment/Plan   Principal Problem:    S/P total knee arthroplasty, right    Postop day 3 from right total knee arthroplasty.  Plan for physical therapy for mobilization and strengthening.  Home DVT prophylaxis. If he fails his voiding trial today he will go home with a Ortega and follow-up with urology in 5 days.  Plan for discharge home 4/5/2024             Yonis Silva MD      "

## 2024-04-04 NOTE — CARE PLAN
The patient's goals for the shift include  promote rest and decrease anxiety    The clinical goals for the shift include promote rest, pain management, monitor I and O's, monitor VS, and decrease anxiety      04/03/24 at 10:56 PM - Sona Carlos RN

## 2024-04-04 NOTE — PROGRESS NOTES
"Occupational Therapy    OT Treatment    Patient Name: Qamar Mckinney \"Toni\"  MRN: [id number]  Today's Date: 4/4/2024  Time Calculation  Start Time: 0726  Stop Time: 0817  Time Calculation (min): 51 min         Assessment:  OT Assessment: Pt appears anxious, needs additonal time to initiate transitions, overwelmed at times. Unsafe for chair transfer due to orthostatic BP.  supine 120/87  sitting 137/91 standing 87/44  Barriers to Discharge:  (unable to advance mobiity/activity due to BP issues)  Evaluation/Treatment Tolerance: Treatment limited secondary to medical complications (Comment)  Medical Staff Made Aware: Yes  End of Session Communication: Bedside nurse  End of Session Patient Position: Bed, 3 rail up, Alarm on (all needs in reach)  OT Assessment Results: Decreased endurance, Decreased ADL status, Decreased functional mobility  Barriers to Discharge:  (unable to advance mobiity/activity due to BP issues)  Evaluation/Treatment Tolerance: Treatment limited secondary to medical complications (Comment)  Medical Staff Made Aware: Yes  Plan:  Treatment Interventions: Functional transfer training, Endurance training, Compensatory technique education  OT Frequency: 4 times per week  OT Discharge Recommendations: Moderate intensity level of continued care  Equipment Recommended upon Discharge: Wheeled walker  OT Recommended Transfer Status: Assist of 2  OT - OK to Discharge: Yes  Treatment Interventions: Functional transfer training, Endurance training, Compensatory technique education    Subjective   Previous Visit Info:  OT Last Visit  OT Received On: 04/04/24  General:  General  Reason for Referral: recent surgery  Referred By: Dr. Silva  Past Medical History Relevant to Rehab: a-fib, HTN, BPH, CHF, TIA, essential tremor, PMR, OA ag. knees, macular degenertion  Prior to Session Communication: Bedside nurse  Patient Position Received: Bed, 3 rail up, Alarm on  General Comment: agreeable to " treatment  Precautions:  Hearing/Visual Limitations: glasses  LE Weight Bearing Status: Weight Bearing as Tolerated  Medical Precautions: Fall precautions  Post-Surgical Precautions: Right total knee precautions  Precautions Comment: Pt unable to recall precautions from previous date, therapist reviews with pt  Pain:  Pain Assessment  Pain Assessment: 0-10  Pain Score: 7  Pain Type: Surgical pain  Pain Location: Knee  Pain Orientation: Right    Objective    Cognition:  Cognition  Overall Cognitive Status: Within Functional Limits  Attention: Exceptions to WFL  Functional Standing Tolerance:  Time: ~ 4 minutes x2 trials  Activity: to take BP in stance  Functional Standing Tolerance Comments: MIn A x2 for safety, fair balance with walker, c/o increased dizziness, cues for upright posture  Bed Mobility/Transfers: Bed Mobility 1  Bed Mobility 1: Supine to sitting  Level of Assistance 1: Minimum assistance  Bed Mobility Comments 1: Min A for RLE, cues for compensatory technique, unable to perform  Bed Mobility 2  Bed Mobility  2: Sitting to supine  Level of Assistance 2: Close supervision    Transfer 1  Transfer From 1: Bed to  Transfer to 1: Bed  Technique 1: Sit to stand, Stand to sit  Transfer Device 1: Walker  Transfer Level of Assistance 1: Minimum assistance, +2  Trials/Comments 1: bed surface elevated, addtional time needed, cues for safe hand placement. Pt able to take ~ 6 side steps to HOB with Min A x2.  Transfers 2  Transfer From 2: Bed to  Transfer to 2: Bed  Technique 2: Sit to stand, Stand to sit  Transfer Device 2: Walker  Transfer Level of Assistance 2: Minimum assistance, +2    Sitting Balance:  Static Sitting Balance  Static Sitting-Balance Support: Feet supported  Static Sitting-Level of Assistance: Close supervision  Static Sitting-Comment/Number of Minutes: 5 minutes x2 occurances  Standing Balance:  Static Standing Balance  Static Standing-Balance Support: Bilateral upper extremity supported (on  walker)  Static Standing-Level of Assistance: Contact guard (x2 for safety)  Static Standing-Comment/Number of Minutes: 4 minutes x 2 occurances  Outcome Measures:[facility] Daily Activity  Putting on and taking off regular lower body clothing: A lot  Bathing (including washing, rinsing, drying): A lot  Putting on and taking off regular upper body clothing: A little  Toileting, which includes using toilet, bedpan or urinal: A little  Taking care of personal grooming such as brushing teeth: None  Eating Meals: None  Daily Activity - Total Score: 18        Education Documentation  Precautions, taught by ALEXANDRA Alvares at 4/4/2024 10:09 AM.  Learner: Patient  Readiness: Acceptance  Method: Explanation  Response: Verbalizes Understanding, Demonstrated Understanding, Needs Reinforcement    ADL Training, taught by ALEXANDRA Alvares at 4/4/2024 10:09 AM.  Learner: Patient  Readiness: Acceptance  Method: Explanation  Response: Verbalizes Understanding, Demonstrated Understanding, Needs Reinforcement    Precautions, taught by ALEXANDRA Alvares at 4/3/2024 11:00 AM.  Learner: Patient  Readiness: Acceptance  Method: Explanation, Demonstration  Response: Verbalizes Understanding, Demonstrated Understanding, Needs Reinforcement    ADL Training, taught by ALEXANDRA Alvares at 4/3/2024 11:00 AM.  Learner: Patient  Readiness: Acceptance  Method: Explanation, Demonstration  Response: Verbalizes Understanding, Demonstrated Understanding, Needs Reinforcement    Education Comments  No comments found.    IP EDUCATION:  Education  Individual(s) Educated: Patient  Diagnosis and Precautions: Pt instructed in knee prec.  Patient Response to Education: Patient/Caregiver Verbalized Understanding of Information, Patient/Caregiver Performed Return Demonstration of Exercises/Activities    Goals:  Encounter Problems       Encounter Problems (Active)       OT Goals       ADL's (Progressing)       Start:  04/02/24    Expected End:  04/16/24       Pt  will complete bathing, dressing and grooming tasks w/ mod I w/ Ae/ compe nsagtory tech as need for ease, safety and increased independence in self care tasks         Functional transfers (Progressing)       Start:  04/02/24    Expected End:  04/16/24       Pt will demon functional transfers w/ mod I w/ LRD for safety and increased independence in functional transfers         Precautions (Progressing)       Start:  04/02/24    Expected End:  04/16/24       Pt will verbalize/ demon surgical precautions consistently for safety and increased independence in daily tasks and functional mobility.,

## 2024-04-04 NOTE — PROGRESS NOTES
"Physical Therapy    Physical Therapy Treatment    Patient Name: Qamar Mckinney \"Payton"  MRN: [id number]  Today's Date: 4/4/2024  Time Calculation  Start Time: 1033  Stop Time: 1114  Time Calculation (min): 41 min       Assessment/Plan   PT Assessment  Rehab Prognosis: Good  Evaluation/Treatment Tolerance: Treatment limited secondary to medical complications (Comment) (lightheadedness)  Medical Staff Made Aware: Yes  End of Session Communication: Bedside nurse  Assessment Comment: Pt still c/o a little lightheadedness when up but tolerated increased activity this visit. Pt will need to trial stairs prior to discharge if homegoing.  End of Session Patient Position: Bed, 3 rail up, Alarm on     PT Plan  Treatment/Interventions: Bed mobility, Transfer training, Therapeutic exercise, Range of motion, Home exercise program  PT Plan: Skilled PT  PT Frequency: BID  PT Discharge Recommendations: Low intensity level of continued care  Equipment Recommended upon Discharge: Wheeled walker  PT Recommended Transfer Status: Assist x1, Assistive device  PT - OK to Discharge: Yes      General Visit Information:   PT  Visit  PT Received On: 04/04/24  General  Prior to Session Communication: Bedside nurse  Patient Position Received: Bed, 3 rail up, Alarm on  Preferred Learning Style: verbal  General Comment: agreeable to treatment    Subjective   Precautions:  Precautions  Hearing/Visual Limitations: glasses  LE Weight Bearing Status: Weight Bearing as Tolerated  Medical Precautions: Fall precautions  Post-Surgical Precautions: Right total knee precautions  Vital Signs:       Objective   Pain:  Pain Assessment  Pain Assessment: 0-10  Pain Score: 4  Pain Type: Surgical pain  Pain Location: Knee  Pain Orientation: Right  Pain Interventions: Cold pack  Cognition:  Cognition  Overall Cognitive Status: Within Functional Limits  Orientation Level: Oriented X4  Postural Control:     Extremity/Trunk Assessments:    Activity Tolerance:  Activity " Tolerance  Endurance: Decreased tolerance for upright activites  Treatments:  Therapeutic Exercise  Therapeutic Exercise Activity 1: Pt performed seated bilat LE ankle pumps/heel raises, LAQ, wally hip adduction, resisted hip abduction 2x15 reps each. Seated heel slides right 2x15. Therex performed sitting edge of bed. Pt reported no lightheadedness initially, c/o lightheadedness after sitting ~25 minutes. BP sitting edge of bed after 25 minutes was 93/51.    Bed Mobility 1  Bed Mobility 1: Supine to sitting  Level of Assistance 1: Minimum assistance  Bed Mobility Comments 1: Pt needed light min assist with right LE off bed.  Bed Mobility 2  Bed Mobility  2: Sitting to supine  Level of Assistance 2: Close supervision  Bed Mobility 3  Bed Mobility 3: Scooting  Level of Assistance 3: Close supervision  Bed Mobility Comments 3: Pt able to scoot self right along edge of bed to get closer to head of bed.    Ambulation/Gait Training  Ambulation/Gait Training Performed: No (due to lightheadedness)    Transfer 1  Transfer From 1: Bed to  Transfer to 1:  (partial stand)  Technique 1: Sit to stand  Transfer Device 1: Walker  Transfer Level of Assistance 1: Moderate assistance, +2  Trials/Comments 1: Pt needed verbal cues to push from bed surface. Pt able to get to partial stand, unable to complete due to c/o lightheadedness worsening. Pt gets anxious and fidgety with lightheadedness. Returned to sitting edge of bed.      Outcome Measures:  [facility] Basic Mobility  Turning from your back to your side while in a flat bed without using bedrails: A little  Moving from lying on your back to sitting on the side of a flat bed without using bedrails: A little  Moving to and from bed to chair (including a wheelchair): A little  Standing up from a chair using your arms (e.g. wheelchair or bedside chair): A lot  To walk in hospital room: A lot  Climbing 3-5 steps with railing: A lot  Basic Mobility - Total Score: 15    Education  Documentation  Handouts, taught by Katelyn Longoria PTA at 4/4/2024 11:32 AM.  Learner: Patient  Readiness: Acceptance  Method: Explanation  Response: Verbalizes Understanding    Precautions, taught by Katelyn Longoria PTA at 4/4/2024 11:32 AM.  Learner: Patient  Readiness: Acceptance  Method: Explanation  Response: Verbalizes Understanding    Body Mechanics, taught by Katelyn Longoria PTA at 4/4/2024 11:32 AM.  Learner: Patient  Readiness: Acceptance  Method: Explanation  Response: Verbalizes Understanding    Home Exercise Program, taught by Katelyn Longoria PTA at 4/4/2024 11:32 AM.  Learner: Patient  Readiness: Acceptance  Method: Explanation  Response: Verbalizes Understanding    Mobility Training, taught by Katelyn Longoria PTA at 4/4/2024 11:32 AM.  Learner: Patient  Readiness: Acceptance  Method: Explanation  Response: Verbalizes Understanding    Education Comments  No comments found.        OP EDUCATION:       Encounter Problems       Encounter Problems (Active)       Balance       LTG - Patient will maintain balance to allow for safe mobility (Progressing)       Start:  04/02/24    Expected End:  04/04/24               Mobility       STG - Patient will ambulate 100' w/ supervision and RW (Progressing)       Start:  04/02/24    Expected End:  04/04/24            STG - Patient will ascend and descend four stairs w/ 1 rail, cane and min assist (Progressing)       Start:  04/02/24    Expected End:  04/04/24            Pt will demonstrate good comprehension and performance pf TKA HEP (Progressing)       Start:  04/02/24    Expected End:  04/04/24               PT Transfers       STG - Patient to transfer to and from sit to supine IND (Progressing)       Start:  04/02/24    Expected End:  04/04/24            STG - Patient will transfer sit to and from stand w/ distant supervision  (Progressing)       Start:  04/02/24    Expected End:  04/04/24               Safety       Pt will demonstrate comprehension and compliance of TKA  precautions (Progressing)       Start:  04/02/24    Expected End:  04/04/24

## 2024-04-04 NOTE — PROGRESS NOTES
"Physical Therapy    Physical Therapy Treatment    Patient Name: Qamar Mckinney \"Toni\"  MRN: [id number]  Today's Date: 4/4/2024  Time Calculation  Start Time: 1307  Stop Time: 1456  Returned from 0067-6145 to assist getting pt back to bed.  Time Calculation (min): 72 min       Assessment/Plan   PT Assessment  Rehab Prognosis: Good  Evaluation/Treatment Tolerance: Treatment limited secondary to medical complications (Comment) (lightheadedness)  Medical Staff Made Aware: Yes  End of Session Communication: Bedside nurse  Assessment Comment: Pt has diffculty with sit to stand from low seated surfaces (chair was also built up with blankets and two pillows and pt still struggled) Required max assist of 2 for sit to stand from chair. No c/o lightheadedness until pt sat in chair after walking, lightheadedness resolved quickly.  End of Session Patient Position: Bed, 3 rail up, Alarm on (Pt left up in chair at end of session, returned to assist pt getting back to bed after sitting in chair with alarm for ~40 minutes.)     PT Plan  Treatment/Interventions: Bed mobility, Transfer training, Gait training, Therapeutic exercise, Range of motion, Home exercise program  PT Plan: Skilled PT  PT Frequency: BID  PT Discharge Recommendations: Moderate intensity level of continued care  Equipment Recommended upon Discharge: Wheeled walker  PT Recommended Transfer Status: Assist x1, Assistive device  PT - OK to Discharge: Yes      General Visit Information:   PT  Visit  PT Received On: 04/04/24  General  Prior to Session Communication: Bedside nurse  Patient Position Received: Bed, 3 rail up, Alarm on  Preferred Learning Style: verbal  General Comment: agreeable to treatment. Cruz by nurse; unable to get pt up from chair to return to bed--Returned from [id number] to assist with transfer.    Subjective   Precautions:  Precautions  Hearing/Visual Limitations: glasses  LE Weight Bearing Status: Weight Bearing as Tolerated  Medical Precautions: " Fall precautions  Post-Surgical Precautions: Right total knee precautions  Vital Signs:       Objective   Pain:  Pain Assessment  Pain Assessment: 0-10  Pain Score: 4  Pain Type: Surgical pain  Pain Location: Knee  Pain Orientation: Right  Pain Interventions: Cold pack  Cognition:  Cognition  Overall Cognitive Status: Within Functional Limits  Orientation Level: Oriented X4  Postural Control:     Extremity/Trunk Assessments:    Activity Tolerance:  Activity Tolerance  Endurance: Decreased tolerance for upright activites  Treatments:  Therapeutic Exercise  Therapeutic Exercise Activity 1: Pt performed supine bilat LE ankle pumps, quad sets, glute sets, heel slides with assist right, hip abduction slides, SLR with assist right and SAQ with assist right x25 reps each.    Bed Mobility 1  Bed Mobility 1: Supine to sitting  Level of Assistance 1: Close supervision  Bed Mobility Comments 1:   Bed Mobility 2  Bed Mobility  2: Sitting to supine  Level of Assistance 2: Close supervision    Ambulation/Gait Training  Ambulation/Gait Training Performed:   Ambulation/Gait Training 1  Surface 1: Level tile  Device 1: Rolling walker  Gait Support Devices: Wheelchair follow  Assistance 1: Contact guard  Comments/Distance (ft) 1: Pt ambulated with RW ~100' x1 with one turn, CGA. Pt able to demonstrate reciprocal pattern, decreased bilat step length. Slow kacey. No c/o dizziness.  Transfer 1  Transfer From 1: Bed to  Transfer to 1: Stand  Technique 1: Sit to stand  Transfer Device 1: Walker  Transfer Level of Assistance 1: Minimum assistance  Trials/Comments 1: Bed height slightly elevated  Transfers 2  Transfer From 2: Stand to  Transfer to 2: Sit, Chair with arms  Technique 2: Stand to sit  Transfer Level of Assistance 2: Contact guard  Transfers 3  Transfer From 3: Chair with arms to  Transfer to 3: Stand  Technique 3: Sit to stand  Transfer Device 3: Gait belt  Transfer Level of Assistance 3: Maximum assistance,  +2  Trials/Comments 3: First trial pt able to come to partial stand, unable to get fully upright and place hands on walker. Second trial pt able to come to full stand with max assist of 2 and gait belt. Pt used RW to take 5-6 steps to bedside CGA.  Transfers 4  Transfer From 4: Stand to  Transfer to 4: Sit, Bed  Technique 4: Stand to sit  Transfer Level of Assistance 4: Contact guard    Outcome Measures:  [facility] Basic Mobility  Turning from your back to your side while in a flat bed without using bedrails: A little  Moving from lying on your back to sitting on the side of a flat bed without using bedrails: A little  Moving to and from bed to chair (including a wheelchair): A little  Standing up from a chair using your arms (e.g. wheelchair or bedside chair): A lot  To walk in hospital room: A little  Climbing 3-5 steps with railing: A lot  Basic Mobility - Total Score: 16    Education Documentation  Handouts, taught by Katelyn Longoria PTA at 4/4/2024  3:29 PM.  Learner: Patient  Readiness: Acceptance  Method: Explanation  Response: Verbalizes Understanding    Precautions, taught by Katelyn Longoria PTA at 4/4/2024  3:29 PM.  Learner: Patient  Readiness: Acceptance  Method: Explanation  Response: Verbalizes Understanding    Body Mechanics, taught by Katelyn Longoria PTA at 4/4/2024  3:29 PM.  Learner: Patient  Readiness: Acceptance  Method: Explanation  Response: Verbalizes Understanding    Home Exercise Program, taught by Katelyn Longoria PTA at 4/4/2024  3:29 PM.  Learner: Patient  Readiness: Acceptance  Method: Explanation  Response: Verbalizes Understanding    Mobility Training, taught by Katelyn Longoria PTA at 4/4/2024  3:29 PM.  Learner: Patient  Readiness: Acceptance  Method: Explanation  Response: Verbalizes Understanding    Education Comments  No comments found.        OP EDUCATION:       Encounter Problems       Encounter Problems (Active)       Balance       LTG - Patient will maintain balance to allow for safe mobility  (Progressing)       Start:  04/02/24    Expected End:  04/04/24               Mobility       STG - Patient will ambulate 100' w/ supervision and RW (Progressing)       Start:  04/02/24    Expected End:  04/04/24            STG - Patient will ascend and descend four stairs w/ 1 rail, cane and min assist (Progressing)       Start:  04/02/24    Expected End:  04/04/24            Pt will demonstrate good comprehension and performance pf TKA HEP (Progressing)       Start:  04/02/24    Expected End:  04/04/24               PT Transfers       STG - Patient to transfer to and from sit to supine IND (Progressing)       Start:  04/02/24    Expected End:  04/04/24            STG - Patient will transfer sit to and from stand w/ distant supervision  (Progressing)       Start:  04/02/24    Expected End:  04/04/24               Safety       Pt will demonstrate comprehension and compliance of TKA precautions (Progressing)       Start:  04/02/24    Expected End:  04/04/24

## 2024-04-04 NOTE — CARE PLAN
The patient's goals for the shift include      The clinical goals for the shift include monitor vitals, remain free from falls      Problem: Pain  Goal: My pain/discomfort is manageable  Outcome: Progressing     Problem: Safety  Goal: Patient will be injury free during hospitalization  Outcome: Progressing  Goal: I will remain free of falls  Outcome: Progressing     Problem: Daily Care  Goal: Daily care needs are met  Outcome: Progressing     Problem: Psychosocial Needs  Goal: Demonstrates ability to cope with hospitalization/illness  Outcome: Progressing  Goal: Collaborate with me, my family, and caregiver to identify my specific goals  Outcome: Progressing     Problem: Fall/Injury  Goal: Not fall by end of shift  Outcome: Progressing  Goal: Be free from injury by end of the shift  Outcome: Progressing  Goal: Verbalize understanding of personal risk factors for fall in the hospital  Outcome: Progressing  Goal: Verbalize understanding of risk factor reduction measures to prevent injury from fall in the home  Outcome: Progressing  Goal: Use assistive devices by end of the shift  Outcome: Progressing  Goal: Pace activities to prevent fatigue by end of the shift  Outcome: Progressing

## 2024-04-04 NOTE — PROGRESS NOTES
"Toni Mckinney is a 83 y.o. male on day 1 of admission presenting with S/P total knee arthroplasty, right.    Subjective   Pt seen and examined.  Pt aquino was removed again today.  He had issues with hypotension yesterday and this morning when working with PT.        Objective     Physical Exam  Constitutional:       General: He is not in acute distress.     Appearance: He is not ill-appearing or toxic-appearing.   Cardiovascular:      Rate and Rhythm: Tachycardia present. Rhythm irregular.      Pulses: Normal pulses.      Heart sounds: Normal heart sounds.   Pulmonary:      Effort: Pulmonary effort is normal.      Breath sounds: Normal breath sounds.   Abdominal:      General: Bowel sounds are normal.      Palpations: Abdomen is soft.   Skin:     General: Skin is warm and dry.      Capillary Refill: Capillary refill takes 2 to 3 seconds.      Comments: Drsg to RLE dry and intact    Neurological:      General: No focal deficit present.      Mental Status: He is alert and oriented to person, place, and time.   Psychiatric:         Mood and Affect: Mood normal.         Behavior: Behavior normal.     Last Recorded Vitals  Blood pressure 116/61, pulse 88, temperature 36.9 °C (98.4 °F), temperature source Temporal, resp. rate 18, height 1.956 m (6' 5\"), weight 93 kg (205 lb), SpO2 97 %.  Intake/Output last 3 Shifts:  I/O last 3 completed shifts:  In: 2470 (26.6 mL/kg) [P.O.:2470]  Out: 4750 (51.1 mL/kg) [Urine:4750 (1.4 mL/kg/hr)]  Weight: 93 kg     Relevant Results  Scheduled medications  apixaban, 5 mg, oral, BID  atorvastatin, 10 mg, oral, Daily  digoxin, 125 mcg, oral, Daily  metoprolol tartrate, 25 mg, oral, BID  polyethylene glycol, 17 g, oral, Daily  tamsulosin, 0.4 mg, oral, Daily      Continuous medications  oxygen, 2 L/min      PRN medications  PRN medications: acetaminophen, HYDROcodone-acetaminophen, HYDROcodone-acetaminophen, metoprolol, naloxone, naloxone, naloxone, ondansetron ODT **OR** ondansetron "             Assessment/Plan   Principal Problem:    S/P total knee arthroplasty, right    RT TKA  -pain management  -PT, OT     HTN  -continue BB with parameters  -monitor blood pressure     Chronic Afib with RVR  -telemetry  -rate control  -continue BB  -continue Eliquis  -Cardiology signed off  -digoxin     Hyperlipidemia  -Continue statin     Urinary retention  -Ortega catheter  -Failed Trial void , follow up in urology office 5-7 days     Plan   Above plan discussed with pt and he is in agreement      AMRIK Forman-CNP

## 2024-04-04 NOTE — PROGRESS NOTES
"Occupational Therapy    OT Treatment    Patient Name: Qamar Mckinney \"Payton"  MRN: [id number]  Today's Date: 4/4/2024  Time Calculation  Start Time: 1415  Stop Time: 1443  Time Calculation (min): 28 min         Assessment:  OT Assessment: Pt demnstrating difficulty with LB dresssing education, needing increased and repeated instructions throughout.  Barriers to Discharge:  (unable to advance mobiity/activity due to BP issues)  Evaluation/Treatment Tolerance: Treatment limited secondary to medical complications (Comment)  Medical Staff Made Aware: Yes  End of Session Communication: Bedside nurse  End of Session Patient Position: Bed, 3 rail up, Alarm on (all needs in reach)  OT Assessment Results: Decreased ADL status  Barriers to Discharge:  (unable to advance mobiity/activity due to BP issues)  Evaluation/Treatment Tolerance: Treatment limited secondary to medical complications (Comment)  Medical Staff Made Aware: Yes  Plan:  Treatment Interventions: ADL retraining  OT Frequency: 4 times per week  OT Discharge Recommendations: Moderate intensity level of continued care  Equipment Recommended upon Discharge: Wheeled walker  OT Recommended Transfer Status: Assist of 2  OT - OK to Discharge: Yes  Treatment Interventions: ADL retraining    Subjective   Previous Visit Info:  OT Last Visit  OT Received On: 04/04/24  General:  General  Reason for Referral: recent surgery  Referred By: Dr. Silva  Past Medical History Relevant to Rehab: a-fib, HTN, BPH, CHF, TIA, essential tremor, PMR, OA ag. knees, macular degenertion  Prior to Session Communication: Bedside nurse  Patient Position Received: Up in chair  General Comment: agreeable to treatment. Therapist returned to instruct in LB dressing as pt with Low BP earlier,unable to complete morning session.  Precautions:  Hearing/Visual Limitations: glasses  LE Weight Bearing Status: Weight Bearing as Tolerated  Medical Precautions: Fall precautions  Post-Surgical Precautions: " Right total knee precautions  Precautions Comment: Therapist provides knee precatuion handout and reviews with pt.  Pain:  Pain Assessment  Pain Assessment: 0-10  Pain Score: 4  Pain Type: Surgical pain  Pain Location: Knee  Pain Orientation: Right    Objective    Cognition:  Cognition  Overall Cognitive Status: Within Functional Limits  Attention: Exceptions to WFL  Problem Solving: Exceptions to WFL  Simple Functional Tasks: Impaired  Processing Speed: Delayed     Activities of Daily Living: LE Dressing  LE Dressing Adaptive Equipment: Reacher, Sock aide  Pants Level of Assistance: Minimum assistance  Sock Level of Assistance: Minimum assistance  LE Dressing Where Assessed: Chair  LE Dressing Comments: pt doffed/donned socks, pants to knees using AE. Therapist instructs in safety with LB dressing, correct form, effective use of AE with LB dressing. Pt presents with difficulty using AE efficiently, needing frequent cues and assist to remove sock over heels. Pt needing additonal time/effort to complete.  Functional Standing Tolerance:  Time: ~ 4 minutes x2 trials  Activity: to take BP in stance  Functional Standing Tolerance Comments: MIn A x2 for safety, fair balance with walker, c/o increased dizziness, cues for upright posture  Bed Mobility/Transfers: Toilet Transfers  Toilet Transfers Comments: Therapist educates in use of 3-1 commode over toilet ( pt stating he has at home). Pt needing repeated instructions and therapist demonstrating  on how to use over toilet.  Tub Transfers  Tub Transfers Comments: Therapist recommends TTB, provides purchase info/picture and educates in correct use.  Outcome Measures:[facility] Daily Activity  Putting on and taking off regular lower body clothing: A lot  Bathing (including washing, rinsing, drying): A lot  Putting on and taking off regular upper body clothing: A little  Toileting, which includes using toilet, bedpan or urinal: A lot  Taking care of personal grooming such as  brushing teeth: None  Eating Meals: None  Daily Activity - Total Score: 17        Education Documentation  ADL Training, taught by ALEXANDRA Alvares at 4/4/2024  3:16 PM.  Learner: Patient  Readiness: Acceptance  Method: Explanation, Demonstration, Handout  Response: Verbalizes Understanding, Demonstrated Understanding, Needs Reinforcement    Precautions, taught by ALEXANDRA Alvares at 4/4/2024 10:09 AM.  Learner: Patient  Readiness: Acceptance  Method: Explanation  Response: Verbalizes Understanding, Demonstrated Understanding, Needs Reinforcement    ADL Training, taught by ALEXANDRA Alvares at 4/4/2024 10:09 AM.  Learner: Patient  Readiness: Acceptance  Method: Explanation  Response: Verbalizes Understanding, Demonstrated Understanding, Needs Reinforcement    Education Comments  No comments found.    IP EDUCATION:  Education  Individual(s) Educated: Patient  Education Provided:  (LB dressing to adhere to knee precautions)  Patient Response to Education: Patient/Caregiver Verbalized Understanding of Information, Patient/Caregiver Performed Return Demonstration of Exercises/Activities    Goals:  Encounter Problems       Encounter Problems (Active)       OT Goals       ADL's (Progressing)       Start:  04/02/24    Expected End:  04/16/24       Pt will complete bathing, dressing and grooming tasks w/ mod I w/ Ae/ compe nsagtory tech as need for ease, safety and increased independence in self care tasks         Functional transfers (Progressing)       Start:  04/02/24    Expected End:  04/16/24       Pt will demon functional transfers w/ mod I w/ LRD for safety and increased independence in functional transfers         Precautions (Progressing)       Start:  04/02/24    Expected End:  04/16/24       Pt will verbalize/ demon surgical precautions consistently for safety and increased independence in daily tasks and functional mobility.,

## 2024-04-04 NOTE — PROGRESS NOTES
"Toni Mckinney is a 83 y.o. male on day 1 of admission presenting with S/P total knee arthroplasty, right.    Subjective   Patient feels better this morning.  Just about to start his physical therapy.       Objective     Physical Exam  Eyes:      Conjunctiva/sclera: Conjunctivae normal.   Cardiovascular:      Rate and Rhythm: Normal rate. Rhythm irregularly irregular.      Heart sounds:      No gallop.   Pulmonary:      Breath sounds: Normal breath sounds. No wheezing, rhonchi or rales.   Abdominal:      Palpations: Abdomen is soft.   Neurological:      General: No focal deficit present.      Mental Status: He is alert.       Constitutional:       Appearance: Not in distress.   Eyes:      Conjunctiva/sclera: Conjunctivae normal.   Neck:      Vascular: JVD normal.   Pulmonary:      Breath sounds: Normal breath sounds. No wheezing. No rhonchi. No rales.   Cardiovascular:      Normal rate. Irregularly irregular rhythm.      Murmurs: There is no murmur.      No gallop.  No click. No rub.   Abdominal:      Palpations: Abdomen is soft.   Neurological:      General: No focal deficit present.      Mental Status: Alert.        Last Recorded Vitals  Blood pressure 122/87, pulse 104, temperature 37.1 °C (98.8 °F), temperature source Temporal, resp. rate 18, height 1.956 m (6' 5\"), weight 93 kg (205 lb), SpO2 95 %.  Intake/Output last 3 Shifts:  I/O last 3 completed shifts:  In: 2470 (26.6 mL/kg) [P.O.:2470]  Out: 4750 (51.1 mL/kg) [Urine:4750 (1.4 mL/kg/hr)]  Weight: 93 kg     Relevant Results                             Assessment/Plan   Principal Problem:    S/P total knee arthroplasty, right      S/P total knee arthroplasty April 1, 2024  Longstanding persistent atrial fibrillation  Essential hypertension  History of TIA     4/2: Patient underwent a total knee arthroplasty procedure yesterday and is making a good recovery thus far.  His heart rate has been somewhat elevated and the hospitalist service has been reluctant to " change medications given his lower blood pressure.  At this point we will continue the metoprolol tartrate to 25 mg twice daily and add digoxin as alternative rate modulating medication with no blood pressure effects.  Will also continue the patient's apixaban for stroke prevention.  Hemoglobin is stable at 12.5 on morning laboratory studies.  Will make these changes and follow with you and hopefully the patient will recover quickly.     4/3: Heart rate is better controlled with the addition of digoxin.  Blood pressure is also improved over the last 24 hours as well.  At this time we will continue the combination of the low-dose metoprolol to tartrate 25 mg twice daily and low-dose digoxin 125 mcg once daily.  Eliquis has been restarted for stroke prevention as well.  Patient is okay for discharge home and will have him follow-up with Dr. Castaneda as an outpatient.    4/4: Heart rate is adequately controlled on the combination of digoxin and metoprolol.  Patient had drop in blood pressure with some orthostatic changes yesterday.  From lying to sitting this morning no significant change in systolic blood pressure.  He is undergoing physical therapy at this time.  Ortega catheter has been removed and voiding trial underway.  Hopefully will be able to discharge home later today.  Patient will follow with Dr. Castaneda on a regular basis.          I spent 20 minutes in the professional and overall care of this patient.      Primitivo Gaming DO

## 2024-04-04 NOTE — PROGRESS NOTES
Spiritual Care Visit    Clinical Encounter Type  Visited With: Patient  Routine Visit: Follow-up  Continue Visiting: Yes         Values/Beliefs  Spiritual Requests During Hospitalization: Communion today    Sacramental Encounters  Communion: Patient wants communion  Communion Given Indicator: Yes     Cruzito Chávez

## 2024-04-04 NOTE — CARE PLAN
The patient's goals for the shift include  urinate without aquino in.     The clinical goals for the shift include promote rest, pain management, monitor I and O's, monitor VS, and decrease anxiety      Problem: Pain  Goal: My pain/discomfort is manageable  Outcome: Progressing     Problem: Safety  Goal: Patient will be injury free during hospitalization  Outcome: Progressing  Goal: I will remain free of falls  Outcome: Progressing     Problem: Daily Care  Goal: Daily care needs are met  Outcome: Progressing     Problem: Psychosocial Needs  Goal: Demonstrates ability to cope with hospitalization/illness  Outcome: Progressing  Goal: Collaborate with me, my family, and caregiver to identify my specific goals  Outcome: Progressing     Problem: Discharge Barriers  Goal: My discharge needs are met  Outcome: Progressing     Problem: Fall/Injury  Goal: Not fall by end of shift  Outcome: Progressing  Goal: Be free from injury by end of the shift  Outcome: Progressing  Goal: Verbalize understanding of personal risk factors for fall in the hospital  Outcome: Progressing  Goal: Verbalize understanding of risk factor reduction measures to prevent injury from fall in the home  Outcome: Progressing  Goal: Use assistive devices by end of the shift  Outcome: Progressing  Goal: Pace activities to prevent fatigue by end of the shift  Outcome: Progressing     Problem: Pain  Goal: Takes deep breaths with improved pain control throughout the shift  Outcome: Progressing  Goal: Turns in bed with improved pain control throughout the shift  Outcome: Progressing  Goal: Walks with improved pain control throughout the shift  Outcome: Progressing  Goal: Performs ADL's with improved pain control throughout shift  Outcome: Progressing  Goal: Participates in PT with improved pain control throughout the shift  Outcome: Progressing  Goal: Free from opioid side effects throughout the shift  Outcome: Progressing  Goal: Free from acute confusion related  to pain meds throughout the shift  Outcome: Progressing     Problem: Deep Vein Thrombosis  Goal: I will remain free from complications of deep vein thrombosis and maintain current level of mobility  Outcome: Progressing     Problem: Indwelling Catheter Maintenance  Goal: I will have no complications from indwelling catheter  Outcome: Progressing

## 2024-04-04 NOTE — PROGRESS NOTES
04/04/24 0905   Discharge Planning   Patient expects to be discharged to: Home with [facility]   Does the patient need discharge transport arranged? No       Issues with dizziness yesterday and orthostatic hypotention.  Likely, discharge home today.  [facility] will see patient.

## 2024-04-05 ENCOUNTER — PHARMACY VISIT (OUTPATIENT)
Dept: PHARMACY | Facility: CLINIC | Age: 84
End: 2024-04-05
Payer: MEDICARE

## 2024-04-05 VITALS
WEIGHT: 205 LBS | RESPIRATION RATE: 18 BRPM | DIASTOLIC BLOOD PRESSURE: 55 MMHG | SYSTOLIC BLOOD PRESSURE: 101 MMHG | HEIGHT: 77 IN | TEMPERATURE: 97.3 F | HEART RATE: 85 BPM | OXYGEN SATURATION: 99 % | BODY MASS INDEX: 24.21 KG/M2

## 2024-04-05 PROCEDURE — 97110 THERAPEUTIC EXERCISES: CPT | Mod: GP,CQ

## 2024-04-05 PROCEDURE — 2500000002 HC RX 250 W HCPCS SELF ADMINISTERED DRUGS (ALT 637 FOR MEDICARE OP, ALT 636 FOR OP/ED): Performed by: UROLOGY

## 2024-04-05 PROCEDURE — 97535 SELF CARE MNGMENT TRAINING: CPT | Mod: GO,CO

## 2024-04-05 PROCEDURE — 97530 THERAPEUTIC ACTIVITIES: CPT | Mod: GO,CO

## 2024-04-05 PROCEDURE — RXMED WILLOW AMBULATORY MEDICATION CHARGE

## 2024-04-05 PROCEDURE — 97116 GAIT TRAINING THERAPY: CPT | Mod: GP,CQ

## 2024-04-05 PROCEDURE — 2500000004 HC RX 250 GENERAL PHARMACY W/ HCPCS (ALT 636 FOR OP/ED): Performed by: STUDENT IN AN ORGANIZED HEALTH CARE EDUCATION/TRAINING PROGRAM

## 2024-04-05 PROCEDURE — 2500000001 HC RX 250 WO HCPCS SELF ADMINISTERED DRUGS (ALT 637 FOR MEDICARE OP): Performed by: NURSE PRACTITIONER

## 2024-04-05 PROCEDURE — 2500000001 HC RX 250 WO HCPCS SELF ADMINISTERED DRUGS (ALT 637 FOR MEDICARE OP): Performed by: STUDENT IN AN ORGANIZED HEALTH CARE EDUCATION/TRAINING PROGRAM

## 2024-04-05 PROCEDURE — 99232 SBSQ HOSP IP/OBS MODERATE 35: CPT | Performed by: INTERNAL MEDICINE

## 2024-04-05 PROCEDURE — 2500000001 HC RX 250 WO HCPCS SELF ADMINISTERED DRUGS (ALT 637 FOR MEDICARE OP): Performed by: INTERNAL MEDICINE

## 2024-04-05 PROCEDURE — 97530 THERAPEUTIC ACTIVITIES: CPT | Mod: GP,CQ

## 2024-04-05 RX ADMIN — HYDROCODONE BITARTRATE AND ACETAMINOPHEN 1 TABLET: 5; 325 TABLET ORAL at 00:48

## 2024-04-05 RX ADMIN — APIXABAN 5 MG: 5 TABLET, FILM COATED ORAL at 09:23

## 2024-04-05 RX ADMIN — HYDROCODONE BITARTRATE AND ACETAMINOPHEN 1 TABLET: 5; 325 TABLET ORAL at 09:23

## 2024-04-05 RX ADMIN — DIGOXIN 125 MCG: 125 TABLET ORAL at 09:23

## 2024-04-05 RX ADMIN — METOPROLOL TARTRATE 25 MG: 25 TABLET, FILM COATED ORAL at 09:23

## 2024-04-05 RX ADMIN — HYDROCODONE BITARTRATE AND ACETAMINOPHEN 1 TABLET: 5; 325 TABLET ORAL at 15:41

## 2024-04-05 RX ADMIN — TAMSULOSIN HYDROCHLORIDE 0.4 MG: 0.4 CAPSULE ORAL at 09:23

## 2024-04-05 RX ADMIN — POLYETHYLENE GLYCOL 3350 17 G: 17 POWDER, FOR SOLUTION ORAL at 09:23

## 2024-04-05 RX ADMIN — ATORVASTATIN CALCIUM 10 MG: 10 TABLET, FILM COATED ORAL at 09:23

## 2024-04-05 ASSESSMENT — PAIN SCALES - GENERAL
PAINLEVEL_OUTOF10: 5 - MODERATE PAIN
PAINLEVEL_OUTOF10: 6
PAINLEVEL_OUTOF10: 2
PAINLEVEL_OUTOF10: 4
PAINLEVEL_OUTOF10: 0 - NO PAIN
PAINLEVEL_OUTOF10: 4
PAINLEVEL_OUTOF10: 2
PAINLEVEL_OUTOF10: 3

## 2024-04-05 ASSESSMENT — COGNITIVE AND FUNCTIONAL STATUS - GENERAL
MOBILITY SCORE: 18
MOVING TO AND FROM BED TO CHAIR: A LITTLE
WALKING IN HOSPITAL ROOM: A LITTLE
DRESSING REGULAR LOWER BODY CLOTHING: A LOT
HELP NEEDED FOR BATHING: A LOT
TURNING FROM BACK TO SIDE WHILE IN FLAT BAD: A LITTLE
CLIMB 3 TO 5 STEPS WITH RAILING: A LOT
DRESSING REGULAR UPPER BODY CLOTHING: A LITTLE
DRESSING REGULAR UPPER BODY CLOTHING: A LITTLE
DRESSING REGULAR LOWER BODY CLOTHING: A LOT
TOILETING: A LOT
MOVING TO AND FROM BED TO CHAIR: A LITTLE
STANDING UP FROM CHAIR USING ARMS: A LITTLE
MOBILITY SCORE: 16
STANDING UP FROM CHAIR USING ARMS: A LOT
CLIMB 3 TO 5 STEPS WITH RAILING: A LITTLE
DAILY ACTIVITIY SCORE: 17
TURNING FROM BACK TO SIDE WHILE IN FLAT BAD: A LITTLE
HELP NEEDED FOR BATHING: A LOT
MOVING FROM LYING ON BACK TO SITTING ON SIDE OF FLAT BED WITH BEDRAILS: A LITTLE
MOVING FROM LYING ON BACK TO SITTING ON SIDE OF FLAT BED WITH BEDRAILS: A LITTLE
DAILY ACTIVITIY SCORE: 18
TOILETING: A LITTLE
WALKING IN HOSPITAL ROOM: A LITTLE

## 2024-04-05 ASSESSMENT — ACTIVITIES OF DAILY LIVING (ADL)
BATHING_LEVEL_OF_ASSISTANCE: MAXIMUM ASSISTANCE
BATHING_WHERE_ASSESSED: STANDING SINKSIDE

## 2024-04-05 ASSESSMENT — PAIN - FUNCTIONAL ASSESSMENT
PAIN_FUNCTIONAL_ASSESSMENT: 0-10

## 2024-04-05 ASSESSMENT — PAIN DESCRIPTION - LOCATION
LOCATION: KNEE

## 2024-04-05 ASSESSMENT — PAIN DESCRIPTION - ORIENTATION
ORIENTATION: RIGHT

## 2024-04-05 ASSESSMENT — PAIN DESCRIPTION - DESCRIPTORS: DESCRIPTORS: DULL

## 2024-04-05 NOTE — PROGRESS NOTES
"Toni Mckinney is a 83 y.o. male on day 2 of admission presenting with S/P total knee arthroplasty, right.    Subjective   Patient seen today.  Worked with physical therapy.  Tenderness has improved today.  Plan for discharge home today with home health therapy.    Objective     Physical Exam  Right lower extremity: Postop dressing clean dry and intact.  No calf tenderness neurovascular intact distally  Last Recorded Vitals  Blood pressure 101/55, pulse 85, temperature 36.3 °C (97.3 °F), temperature source Temporal, resp. rate 18, height 1.956 m (6' 5\"), weight 93 kg (205 lb), SpO2 99 %.  Intake/Output last 3 Shifts:  I/O last 3 completed shifts:  In: 2280 (24.5 mL/kg) [P.O.:1830; IV Piggyback:450]  Out: 3050 (32.8 mL/kg) [Urine:3050 (0.9 mL/kg/hr)]  Weight: 93 kg     Relevant Results      Scheduled medications  apixaban, 5 mg, oral, BID  atorvastatin, 10 mg, oral, Daily  digoxin, 125 mcg, oral, Daily  metoprolol tartrate, 25 mg, oral, BID  polyethylene glycol, 17 g, oral, Daily  tamsulosin, 0.4 mg, oral, Daily      Continuous medications  oxygen, 2 L/min      PRN medications  PRN medications: acetaminophen, HYDROcodone-acetaminophen, HYDROcodone-acetaminophen, metoprolol, naloxone, naloxone, naloxone, ondansetron ODT **OR** ondansetron  Results for orders placed or performed during the hospital encounter of 04/01/24 (from the past 24 hour(s))   Urinalysis with Reflex Culture and Microscopic   Result Value Ref Range    Color, Urine Colorless (N) Light-Yellow, Yellow, Dark-Yellow    Appearance, Urine Clear Clear    Specific Gravity, Urine 1.003 (N) 1.005 - 1.035    pH, Urine 6.0 5.0, 5.5, 6.0, 6.5, 7.0, 7.5, 8.0    Protein, Urine NEGATIVE NEGATIVE, 10 (TRACE), 20 (TRACE) mg/dL    Glucose, Urine Normal Normal mg/dL    Blood, Urine NEGATIVE NEGATIVE    Ketones, Urine NEGATIVE NEGATIVE mg/dL    Bilirubin, Urine NEGATIVE NEGATIVE    Urobilinogen, Urine Normal Normal mg/dL    Nitrite, Urine NEGATIVE NEGATIVE    Leukocyte " Esterase, Urine NEGATIVE NEGATIVE                              Assessment/Plan   Principal Problem:    S/P total knee arthroplasty, right    Postop day 4 from right total knee arthroplasty.  Plan for physical therapy for mobilization and strengthening.  Home DVT prophylaxis.  Will follow-up with urology on Monday.  Plan for discharge home today with home health therapy           Yonis Silva MD

## 2024-04-05 NOTE — PROGRESS NOTES
"Occupational Therapy    OT Treatment    Patient Name: Qamar Mckinney \"Toni\"  MRN: [id number]  Today's Date: 4/5/2024  Time Calculation  Start Time: 1045  Stop Time: 1149  Time Calculation (min): 64 min         Assessment:  OT Assessment: Pt demonstrates improved mobiity and transfers; however, pt continues to struggle with sit>stands despite use of very elevated seat heights. Pt tends to want to complete activities seated vs standing dispite encouragement to stand and frequently requires extended rest breaks prior to transitions. Poor insite to own limitations.  Evaluation/Treatment Tolerance: Patient tolerated treatment well  Medical Staff Made Aware: Yes  End of Session Communication: Bedside nurse  End of Session Patient Position: Up in chair, Alarm on (all needs in reach)  OT Assessment Results: Decreased ADL status, Decreased endurance, Decreased functional mobility, Decreased IADLs (decreased insight)  Evaluation/Treatment Tolerance: Patient tolerated treatment well  Medical Staff Made Aware: Yes  Strengths: Ability to acquire knowledge, Support of Caregivers, Access to adaptive/assistive products  Barriers to Participation: Insight into problems  Plan:  Treatment Interventions: ADL retraining, Functional transfer training  OT Frequency: 4 times per week  OT Discharge Recommendations: Moderate intensity level of continued care  Equipment Recommended upon Discharge: Wheeled walker  OT Recommended Transfer Status: Assist of 2  OT - OK to Discharge: Yes  Treatment Interventions: ADL retraining, Functional transfer training    Subjective   Previous Visit Info:  OT Last Visit  OT Received On: 04/05/24  General:  General  Reason for Referral: recent surgery  Referred By: Dr. Silva  Past Medical History Relevant to Rehab: a-fib, HTN, BPH, CHF, TIA, essential tremor, PMR, OA ag. knees, macular degenertion  Prior to Session Communication: Bedside nurse  Patient Position Received: Up in chair  General Comment: Agreeable " "to treatment  Precautions:  Hearing/Visual Limitations: glasses  LE Weight Bearing Status: Weight Bearing as Tolerated  Medical Precautions: Fall precautions  Post-Surgical Precautions: Right total knee precautions  Precautions Comment: Therapist provides knee precatuion handout and reviews with pt.     Pain:  Pain Assessment  Pain Assessment: 0-10  Pain Score: 3  Pain Type: Surgical pain  Pain Location: Knee  Pain Orientation: Right  Pain Interventions: Cold pack    Objective    Cognition:  Cognition  Overall Cognitive Status: Within Functional Limits  Orientation Level: Oriented X4  Attention: Exceptions to WFL  Problem Solving: Exceptions to WFL  Simple Functional Tasks: Impaired  Processing Speed: Delayed     Activities of Daily Living: Grooming  Grooming Level of Assistance: Setup  Grooming Where Assessed:  (chair)  Grooming Comments:  (Pt wanting to sit to brush teeth)    UE Bathing  UE Bathing Level of Assistance: Setup  UE Bathing Where Assessed: Sitting sinkside  UE Bathing Comments: sponge bathing    LE Bathing  LE Bathing Level of Assistance: Maximum assistance  LE Bathing Where Assessed: Standing sinkside  LE Bathing Comments: janneth area only due to bilateral robbie hose. Pt stating\" I dont think I can let go of the walker to try\"    Toileting  Toileting Level of Assistance: Close supervision  Where Assessed: Toilet (BSC over toilet)  Toileting Comments: hygiene only, seated, after BM     Bed Mobility/Transfers: Transfer 1  Transfer From 1: Chair with arms to  Transfer to 1: Chair with arms  Technique 1: To left  Transfer Device 1: Walker  Transfer Level of Assistance 1: Contact guard  Trials/Comments 1: chair surface elevated with 3 pillows due to pt has difficulty with sit to stand    Toilet Transfers  Toilet Transfer From: Rolling walker  Toilet Transfer Type: To and from  Toilet Transfer to: Standard bedside commode (elevated to highest setting)  Toilet Transfer Technique: Ambulating  Toilet Transfers: " Contact guard  Toilet Transfers Comments: cues to reach back for commode arms, advancing RLE forward prior to sit  Car Transfers  Car Transfer From: Walker  Car Transfer Technique: Ambulating  Car Transfers: Contact guard  Car Transfers Comments: Pt completes simulated car transfer with CGA using elevated therapy mat to accomodate pt height. Therapist instructs in correct form to adhere to knee precautions.      Functional Mobility:  Functional Mobility 1  Surface 1: Level tile  Device 1: Rolling walker  Assistance 1: Contact guard  Quality of Functional Mobility 1:  (slow, cautious, step to gait)  Comments 1: 10 feet x2    Outcome Measures:[facility] Daily Activity  Putting on and taking off regular lower body clothing: A lot  Bathing (including washing, rinsing, drying): A lot  Putting on and taking off regular upper body clothing: A little  Toileting, which includes using toilet, bedpan or urinal: A little  Taking care of personal grooming such as brushing teeth: None  Eating Meals: None  Daily Activity - Total Score: 18        Education Documentation  ADL Training, taught by ALEXANDRA Alvares at 4/4/2024  3:16 PM.  Learner: Patient  Readiness: Acceptance  Method: Explanation, Demonstration, Handout  Response: Verbalizes Understanding, Demonstrated Understanding, Needs Reinforcement    Education Comments  No comments found.    IP EDUCATION:  Education  Individual(s) Educated: Patient  Education Provided: Fall precautons (safe transfr techniques)  Patient Response to Education: Patient/Caregiver Verbalized Understanding of Information, Patient/Caregiver Performed Return Demonstration of Exercises/Activities, Patient/Caregiver Asked Appropriate Questions    Goals:  Encounter Problems       Encounter Problems (Active)       OT Goals       ADL's (Progressing)       Start:  04/02/24    Expected End:  04/16/24       Pt will complete bathing, dressing and grooming tasks w/ mod I w/ Ae/ compe nsagtory tech as need for ease,  safety and increased independence in self care tasks         Functional transfers (Progressing)       Start:  04/02/24    Expected End:  04/16/24       Pt will demon functional transfers w/ mod I w/ LRD for safety and increased independence in functional transfers         Precautions (Progressing)       Start:  04/02/24    Expected End:  04/16/24       Pt will verbalize/ demon surgical precautions consistently for safety and increased independence in daily tasks and functional mobility.,

## 2024-04-05 NOTE — PROGRESS NOTES
"Toni Mckinney is a 83 y.o. male on day 2 of admission presenting with S/P total knee arthroplasty, right.    Subjective   Patient seen and examined.  No documented concerns/events overnight from nursing.  Patient did fail a voiding trial again and will be discharged with Ortega catheter.  Said his dizziness is much improved.  Denies any chest pain or palpitations       Objective     Physical Exam  Constitutional:       General: He is not in acute distress.     Appearance: He is not ill-appearing or toxic-appearing.   Cardiovascular:      Rate and Rhythm: Rhythm irregular.      Pulses: Normal pulses.      Heart sounds: Normal heart sounds.   Pulmonary:      Effort: Pulmonary effort is normal.      Breath sounds: Normal breath sounds.   Abdominal:      General: Bowel sounds are normal.      Palpations: Abdomen is soft.   Skin:     General: Skin is warm and dry.      Capillary Refill: Capillary refill takes 2 to 3 seconds.      Comments: Drsg to RLE dry and intact    Neurological:      General: No focal deficit present.      Mental Status: He is alert and oriented to person, place, and time.   Psychiatric:         Mood and Affect: Mood normal.         Behavior: Behavior normal.     Last Recorded Vitals  Blood pressure 121/62, pulse 90, temperature 36.5 °C (97.7 °F), temperature source Temporal, resp. rate 20, height 1.956 m (6' 5\"), weight 93 kg (205 lb), SpO2 96 %.  Intake/Output last 3 Shifts:  I/O last 3 completed shifts:  In: 2280 (24.5 mL/kg) [P.O.:1830; IV Piggyback:450]  Out: 3050 (32.8 mL/kg) [Urine:3050 (0.9 mL/kg/hr)]  Weight: 93 kg     Relevant Results  Scheduled medications  apixaban, 5 mg, oral, BID  atorvastatin, 10 mg, oral, Daily  digoxin, 125 mcg, oral, Daily  metoprolol tartrate, 25 mg, oral, BID  polyethylene glycol, 17 g, oral, Daily  tamsulosin, 0.4 mg, oral, Daily      Continuous medications  oxygen, 2 L/min      PRN medications  PRN medications: acetaminophen, HYDROcodone-acetaminophen, " HYDROcodone-acetaminophen, metoprolol, naloxone, naloxone, naloxone, ondansetron ODT **OR** ondansetron                         Assessment/Plan   Principal Problem:    S/P total knee arthroplasty, right  RT TKA  -pain management  -PT, OT     HTN  -continue BB with parameters  -monitor blood pressure     Chronic Afib with RVR  -telemetry  -rate control  -continue BB  -continue Eliquis  -Cardiology signed off  -digoxin     Hyperlipidemia  -Continue statin     Urinary retention  -Ortega catheter  -Failed Trial void , follow up in urology office 5-7 days     Plan   Above plan discussed with pt and he is in agreement   Pt medically stable for discharge, will sign off     AMRIK Forman-AMRIK Armas-CNP

## 2024-04-05 NOTE — CARE PLAN
The patient's goals for the shift include  manage pain, work with therapy, rest, discharge home.     The clinical goals for the shift include Manage pain, monitor telemetry/A. fib, monitor labs and VS, safety, no falls, promote rest, discharge home.    No barriers, patient discharged.       Problem: Pain  Goal: My pain/discomfort is manageable  Outcome: Adequate for Discharge     Problem: Safety  Goal: Patient will be injury free during hospitalization  Outcome: Adequate for Discharge  Goal: I will remain free of falls  Outcome: Adequate for Discharge     Problem: Daily Care  Goal: Daily care needs are met  Outcome: Adequate for Discharge     Problem: Psychosocial Needs  Goal: Demonstrates ability to cope with hospitalization/illness  Outcome: Adequate for Discharge  Goal: Collaborate with me, my family, and caregiver to identify my specific goals  Outcome: Adequate for Discharge     Problem: Discharge Barriers  Goal: My discharge needs are met  Outcome: Adequate for Discharge     Problem: Fall/Injury  Goal: Not fall by end of shift  Outcome: Adequate for Discharge  Goal: Be free from injury by end of the shift  Outcome: Adequate for Discharge  Goal: Verbalize understanding of personal risk factors for fall in the hospital  Outcome: Adequate for Discharge  Goal: Verbalize understanding of risk factor reduction measures to prevent injury from fall in the home  Outcome: Adequate for Discharge  Goal: Use assistive devices by end of the shift  Outcome: Adequate for Discharge  Goal: Pace activities to prevent fatigue by end of the shift  Outcome: Adequate for Discharge     Problem: Pain  Goal: Takes deep breaths with improved pain control throughout the shift  Outcome: Adequate for Discharge  Goal: Turns in bed with improved pain control throughout the shift  Outcome: Adequate for Discharge  Goal: Walks with improved pain control throughout the shift  Outcome: Adequate for Discharge  Goal: Performs ADL's with improved  pain control throughout shift  Outcome: Adequate for Discharge  Goal: Participates in PT with improved pain control throughout the shift  Outcome: Adequate for Discharge  Goal: Free from opioid side effects throughout the shift  Outcome: Adequate for Discharge  Goal: Free from acute confusion related to pain meds throughout the shift  Outcome: Adequate for Discharge     Problem: Balance  Goal: LTG - Patient will maintain balance to allow for safe mobility  Outcome: Adequate for Discharge     Problem: Mobility  Goal: STG - Patient will ambulate 100' w/ supervision and RW  Outcome: Adequate for Discharge  Goal: STG - Patient will ascend and descend four stairs w/ 1 rail, cane and min assist  Outcome: Adequate for Discharge  Goal: Pt will demonstrate good comprehension and performance pf TKA HEP  Outcome: Adequate for Discharge     Problem: Safety  Goal: Pt will demonstrate comprehension and compliance of TKA precautions  Outcome: Adequate for Discharge     Problem: PT Transfers  Goal: STG - Patient to transfer to and from sit to supine IND  Outcome: Adequate for Discharge  Goal: STG - Patient will transfer sit to and from stand w/ distant supervision   Outcome: Adequate for Discharge     Problem: OT Goals  Goal: ADL's  Outcome: Adequate for Discharge  Goal: Functional transfers  Outcome: Adequate for Discharge  Goal: Precautions  Outcome: Adequate for Discharge     Problem: Deep Vein Thrombosis  Goal: I will remain free from complications of deep vein thrombosis and maintain current level of mobility  Outcome: Adequate for Discharge     Problem: Indwelling Catheter Maintenance  Goal: I will have no complications from indwelling catheter  Outcome: Adequate for Discharge

## 2024-04-05 NOTE — PROGRESS NOTES
"Toni Mckinney is a 83 y.o. male on day 2 of admission presenting with S/P total knee arthroplasty, right.    Subjective   The patient states he feels better this morning and believes he is ready for discharge home.       Objective     Physical Exam  Eyes:      Conjunctiva/sclera: Conjunctivae normal.   Cardiovascular:      Rate and Rhythm: Normal rate. Rhythm irregularly irregular.      Heart sounds:      No gallop.   Pulmonary:      Breath sounds: Normal breath sounds. No wheezing, rhonchi or rales.   Abdominal:      Palpations: Abdomen is soft.   Neurological:      General: No focal deficit present.      Mental Status: He is alert.       Constitutional:       Appearance: Not in distress.   Eyes:      Conjunctiva/sclera: Conjunctivae normal.   Neck:      Vascular: JVD normal.   Pulmonary:      Breath sounds: Normal breath sounds. No wheezing. No rhonchi. No rales.   Cardiovascular:      Normal rate. Irregularly irregular rhythm.      Murmurs: There is no murmur.      No gallop.  No click. No rub.   Abdominal:      Palpations: Abdomen is soft.   Neurological:      General: No focal deficit present.      Mental Status: Alert.        Last Recorded Vitals  Blood pressure 121/62, pulse 90, temperature 36.5 °C (97.7 °F), temperature source Temporal, resp. rate 20, height 1.956 m (6' 5\"), weight 93 kg (205 lb), SpO2 96 %.  Intake/Output last 3 Shifts:  I/O last 3 completed shifts:  In: 2280 (24.5 mL/kg) [P.O.:1830; IV Piggyback:450]  Out: 3050 (32.8 mL/kg) [Urine:3050 (0.9 mL/kg/hr)]  Weight: 93 kg     Relevant Results                             Assessment/Plan   Principal Problem:    S/P total knee arthroplasty, right        S/P total knee arthroplasty April 1, 2024  Longstanding persistent atrial fibrillation  Essential hypertension  History of TIA     4/2: Patient underwent a total knee arthroplasty procedure yesterday and is making a good recovery thus far.  His heart rate has been somewhat elevated and the " hospitalist service has been reluctant to change medications given his lower blood pressure.  At this point we will continue the metoprolol tartrate to 25 mg twice daily and add digoxin as alternative rate modulating medication with no blood pressure effects.  Will also continue the patient's apixaban for stroke prevention.  Hemoglobin is stable at 12.5 on morning laboratory studies.  Will make these changes and follow with you and hopefully the patient will recover quickly.     4/3: Heart rate is better controlled with the addition of digoxin.  Blood pressure is also improved over the last 24 hours as well.  At this time we will continue the combination of the low-dose metoprolol to tartrate 25 mg twice daily and low-dose digoxin 125 mcg once daily.  Eliquis has been restarted for stroke prevention as well.  Patient is okay for discharge home and will have him follow-up with Dr. Castaneda as an outpatient.     4/4: Heart rate is adequately controlled on the combination of digoxin and metoprolol.  Patient had drop in blood pressure with some orthostatic changes yesterday.  From lying to sitting this morning no significant change in systolic blood pressure.  He is undergoing physical therapy at this time.  Ortega catheter has been removed and voiding trial underway.  Hopefully will be able to discharge home later today.  Patient will follow with Dr. Castaneda on a regular basis.    4/5: Patient failed his voiding trial yesterday and Ortega catheter reinserted.  He also had orthostatic drop in blood pressure yesterday as well.  Blood pressure is stabilized over the last 24 hours and he states he feels much better.  Heart rate is adequately controlled on combination of low-dose metoprolol tartrate and digoxin which will be continued.  Patient also is back on Eliquis as his anticoagulant for stroke prevention.  Hopefully will be able to be discharged home today.  Will have patient follow-up with Dr. Castaneda as an outpatient.  We  will sign off at this time ,thanks.                I spent 20 minutes in the professional and overall care of this patient.      Primitivo Gaming, DO

## 2024-04-05 NOTE — DISCHARGE SUMMARY
Discharge Diagnosis  S/P total knee arthroplasty, right    Issues Requiring Follow-Up  Status post total knee arthroplasty    Test Results Pending At Discharge  Pending Labs       Order Current Status    Extra Urine Gray Tube Collected (04/04/24 2272)    Urinalysis with Reflex Culture and Microscopic In process            Hospital Course   This post total knee arthroplasty.  Worked with physical therapy.  Pain well-controlled with oral pain meds.    Pertinent Physical Exam At Time of Discharge  Physical Exam  Right lower extremity: Postop dressing clean dry and intact.  No calf tenderness.  Neurovascular intact distally.  Home Medications     Medication List      START taking these medications     digoxin 125 MCG tablet; Commonly known as: Lanoxin; Take 1 tablet (125   mcg) by mouth once daily. Do not start before April 4, 2024.   * HYDROcodone-acetaminophen 5-325 mg tablet; Commonly known as: Norco;   Take 2 tablets by mouth every 6 hours if needed for moderate pain (4 - 6).   * HYDROcodone-acetaminophen 5-325 mg tablet; Commonly known as: Norco;   Take 1 tablet by mouth every 6 hours if needed for moderate pain (4 - 6).   polyethylene glycol 17 gram packet; Commonly known as: Glycolax,   Miralax; Take 17 g by mouth once daily as needed (constipation).   tamsulosin 0.4 mg 24 hr capsule; Commonly known as: Flomax; Take 1   capsule (0.4 mg) by mouth once daily. Do not start before April 4, 2024.  * This list has 2 medication(s) that are the same as other medications   prescribed for you. Read the directions carefully, and ask your doctor or   other care provider to review them with you.     CONTINUE taking these medications     atorvastatin 10 mg tablet; Commonly known as: Lipitor   cholecalciferol 25 MCG (1000 UT) capsule; Commonly known as: Vitamin D-3   Eliquis 5 mg tablet; Generic drug: apixaban; TAKE 1 TABLET TWICE A DAY   AS DIRECTED   ICaps AREDS 4,296 mcg-226 mg-90 mg capsule; Generic drug: vitamins    A,C,E-zinc-copper   metoprolol tartrate 25 mg tablet; Commonly known as: Lopressor     STOP taking these medications     Tylenol Extra Strength 500 mg tablet; Generic drug: acetaminophen       Outpatient Follow-Up  Future Appointments   Date Time Provider Department Center   8/8/2024 11:15 AM Silvano Castaneda MD [id number] [facility]   8/26/2024 11:00 AM Gregorio Olea MD [id number] [facility]       Yonis Silva MD

## 2024-04-05 NOTE — PROGRESS NOTES
04/05/24 1350   Discharge Planning   Patient expects to be discharged to: Home with [facility]   Does the patient need discharge transport arranged? No       Spoke with patient at length last night and again this morning regarding  HHC vs SNF with his multiple issues ongoing.   Patient wants to go home with [facility].  He states he has doctor friends who will help him.    ADOD:  today

## 2024-04-05 NOTE — PROGRESS NOTES
"Physical Therapy    Physical Therapy Treatment    Patient Name: Qamar Mckinney \"Toni\"  MRN: [id number]  Today's Date: 4/5/2024  Time Calculation  Start Time: 0950  Start time 810  Stop Time: 1040  Stop time 900  Time Calculation (min): 50 min       Assessment/Plan   PT Assessment  Rehab Prognosis: Good  End of Session Communication: Bedside nurse  Assessment Comment: 3 pillow under patient while seated Min A x 2 for safety Extra time needed with all activities to rest /process next activitiy  End of Session Patient Position: Up in chair     PT Plan  Treatment/Interventions: Transfer training, Bed mobility, Gait training, Stair training, Home exercise program  PT Plan: Skilled PT  PT Frequency: BID  PT Discharge Recommendations: Moderate intensity level of continued care  Equipment Recommended upon Discharge: Wheeled walker  PT Recommended Transfer Status: Assist x1, Assistive device  PT - OK to Discharge: Yes      General Visit Information:   PT  Visit  PT Received On: 04/05/24  General  Reason for Referral: recent surgery  Referred By: Dr. Silva  Past Medical History Relevant to Rehab: a-fib, HTN, BPH, CHF, TIA, essential tremor, PMR, OA ag. knees, macular degenertion  Prior to Session Communication: Bedside nurse  Patient Position Received: Bed, 3 rail up, Alarm on  Preferred Learning Style: verbal  General Comment: Cleared  by nurse. Agreeable to therapy    Subjective   Precautions:  Precautions  Hearing/Visual Limitations: glasses  LE Weight Bearing Status: Weight Bearing as Tolerated  Medical Precautions: Fall precautions  Post-Surgical Precautions: Right total knee precautions  Precautions Comment: Able to recite  Vital Signs:  Vital Signs  BP: 121/62  BP Location: Left leg  BP Method: Automatic  Patient Position: Lying    Objective   Pain:  Pain Assessment  Pain Assessment: 0-10  Pain Score: 5 - Moderate pain  Pain Type: Surgical pain  Pain Location: Knee  Pain Orientation: Right  Pain Descriptors: Dull  Pain " Frequency: Intermittent  Pain Interventions: Cold pack  Cognition:  Cognition  Overall Cognitive Status: Within Functional Limits  Orientation Level: Oriented X4  Problem Solving: Exceptions to WFL  Impulsive: Mildly  Processing Speed: Delayed  Postural Control:  Postural Control  Posture Comment: very forward rounded posture, head down  Extremity/Trunk Assessments:    Activity Tolerance:     Treatments:  Therapeutic Exercise  Therapeutic Exercise Performed: Yes  Therapeutic Exercise Activity 1: B LE supine ther ex: ankle pumps, quad/gluteal sets, heelslides, SAQs assist R LE, hip abduction/adduction, SLRs Assist R LE  x 25 Rest periods due to pain. Extra time due to converstaion/ questions    Bed Mobility  Bed Mobility: Yes  Bed Mobility 1  Bed Mobility 1: Supine to sitting  Level of Assistance 1: Close supervision  Bed Mobility Comments 1: HOB elevated Slight assist for R LE to the EOB x 2 trials due to wanting pain medication before gait/ steps Extra time needed due to s;ight light headedness.  Bed Mobility 2  Bed Mobility  2: Sitting to supine  Level of Assistance 2: Close supervision  Bed Mobility Comments 2: Min A for R LE back to bed ro rest and wait for pain medication (Extra time needed to wait foer pain medication before gait/ steps)    Ambulation/Gait Training  Ambulation/Gait Training Performed: Yes  Ambulation/Gait Training 1  Surface 1: Level tile  Device 1: Rolling walker  Gait Support Devices: Wheelchair follow  Assistance 1: Contact guard  Comments/Distance (ft) 1: 100' x 2 with RW with contact guard assist with slow reciprocal gait with wheelchair follow for safety with slow kacey No dizziness reported (Extra time needed between walks Time needed before stair trial)  Transfers  Transfer: Yes  Transfer 1  Transfer From 1: Bed to  Transfer to 1: Stand  Technique 1: Sit to stand, Stand to sit (Back to bed to await pain medication before  gait/ stair trial)  Transfer Device 1: Walker  Transfer Level  of Assistance 1: Minimal verbal cues  Trials/Comments 1: x 2 trails due out of bed again STS for bed again forgait and trial of steps  Transfers 2  Transfer From 2: Stand to  Transfer to 2: Sit, Chair with arms  Technique 2: Sit to stand  Transfer Device 2: Walker  Transfer Level of Assistance 2: Minimum assistance  Trials/Comments 2: x 2 trails in/out of wheelchair between walks/ steps (Cues to slightly bewteen R LE with sitting/ standing)  Transfers 3  Transfer From 3: Stand to  Transfer to 3: Chair with arms  Technique 3: Sit to stand  Transfer Device 3: Walker  Transfer Level of Assistance 3: Minimum assistance, +2  Trials/Comments 3: at end of second session toget out of bedside chair (Extra time needed)    Stairs  Stairs: Yes  Stairs  Rails 1: Right  Assistance 1: Minimum assistance  Comment/Number of Steps 1: One rail on right Up L LE then R LE lead to sequence Min A second person for safety Down backwards rail on right Down R LE then L LE with blead to sequence with Min A with seconds person for safety Extra time needed.         Outcome Measures:  [facility] Basic Mobility  Turning from your back to your side while in a flat bed without using bedrails: A little  Moving from lying on your back to sitting on the side of a flat bed without using bedrails: A little  Moving to and from bed to chair (including a wheelchair): A little  Standing up from a chair using your arms (e.g. wheelchair or bedside chair): A little  To walk in hospital room: A little  Climbing 3-5 steps with railing: A little  Basic Mobility - Total Score: 18    Education Documentation  Handouts, taught by Ofe Monique PTA at 4/5/2024 11:15 AM.  Learner: Patient  Readiness: Acceptance  Method: Explanation, Teach-back  Response: Verbalizes Understanding, Needs Reinforcement    Precautions, taught by Ofe Monique PTA at 4/5/2024 11:15 AM.  Learner: Patient  Readiness: Acceptance  Method: Explanation, Teach-back  Response: Verbalizes  Understanding, Needs Reinforcement    Body Mechanics, taught by Ofe Monique PTA at 4/5/2024 11:15 AM.  Learner: Patient  Readiness: Acceptance  Method: Explanation, Teach-back  Response: Verbalizes Understanding, Needs Reinforcement    Home Exercise Program, taught by Ofe Monique PTA at 4/5/2024 11:15 AM.  Learner: Patient  Readiness: Acceptance  Method: Explanation, Teach-back  Response: Verbalizes Understanding, Needs Reinforcement    Mobility Training, taught by Ofe Monique PTA at 4/5/2024 11:15 AM.  Learner: Patient  Readiness: Acceptance  Method: Explanation, Teach-back  Response: Verbalizes Understanding, Needs Reinforcement    Education Comments  No comments found.        OP EDUCATION:       Encounter Problems       Encounter Problems (Active)       Balance       LTG - Patient will maintain balance to allow for safe mobility (Progressing)       Start:  04/02/24    Expected End:  04/04/24               Mobility       STG - Patient will ambulate 100' w/ supervision and RW (Progressing)       Start:  04/02/24    Expected End:  04/04/24            STG - Patient will ascend and descend four stairs w/ 1 rail, cane and min assist (Progressing)       Start:  04/02/24    Expected End:  04/04/24            Pt will demonstrate good comprehension and performance pf TKA HEP (Progressing)       Start:  04/02/24    Expected End:  04/04/24               PT Transfers       STG - Patient to transfer to and from sit to supine IND (Progressing)       Start:  04/02/24    Expected End:  04/04/24            STG - Patient will transfer sit to and from stand w/ distant supervision  (Progressing)       Start:  04/02/24    Expected End:  04/04/24               Safety       Pt will demonstrate comprehension and compliance of TKA precautions (Progressing)       Start:  04/02/24    Expected End:  04/04/24

## 2024-04-08 ENCOUNTER — TELEPHONE (OUTPATIENT)
Dept: INPATIENT UNIT | Facility: HOSPITAL | Age: 84
End: 2024-04-08

## 2024-04-09 ENCOUNTER — TELEPHONE (OUTPATIENT)
Dept: PRIMARY CARE | Facility: CLINIC | Age: 84
End: 2024-04-09
Payer: MEDICARE

## 2024-04-09 NOTE — TELEPHONE ENCOUNTER
Willie stated that he wants pt to continue PT 3 times a x 2 weeks, and then week 3 twice a week and reevaluate at that time

## 2024-04-10 ENCOUNTER — TELEPHONE (OUTPATIENT)
Dept: PRIMARY CARE | Facility: CLINIC | Age: 84
End: 2024-04-10
Payer: MEDICARE

## 2024-04-10 NOTE — TELEPHONE ENCOUNTER
Can you fax order to [phone] [facility] . In care of Jose A( ) For order to remove miles in AM.I would just print up this note and fax over from myself,

## 2024-04-11 ENCOUNTER — TELEPHONE (OUTPATIENT)
Dept: PRIMARY CARE | Facility: CLINIC | Age: 84
End: 2024-04-11
Payer: MEDICARE

## 2024-04-11 NOTE — TELEPHONE ENCOUNTER
OK spoke with Tay, no U/O after 6 hrs aquino goes back in. Pt declines rehab, lift chair,etc.  follow up for next aquino void trial would not do for another 1 week at least.

## 2024-04-11 NOTE — TELEPHONE ENCOUNTER
Prior Authorization Approval    Authorization Effective Date: 9/27/2017  Authorization Expiration Date: 1/19/2018  Medication: Zepatier  Approved Dose/Quantity: 12 weeks  Reference #: [id number]   Insurance Company: [employer] - Phone [phone] Fax [phone]  Expected CoPay: $0     CoPay Card Available: No   Foundation Assistance Needed: No  Which Pharmacy is filling the prescription (Not needed for infusion/clinic administered): [facility] - [place], [place] - [facility] [address]     Sienna stated the pt was evaluated today, She recommended 1 x a  week x 4 weeks for OT, a verbal was given by me

## 2024-04-13 DIAGNOSIS — I95.1 ORTHOSTATIC HYPOTENSION: Primary | ICD-10-CM

## 2024-04-13 RX ORDER — MIDODRINE HYDROCHLORIDE 5 MG/1
5 TABLET ORAL
Qty: 60 TABLET | Refills: 11 | Status: SHIPPED | OUTPATIENT
Start: 2024-04-13 | End: 2024-05-04 | Stop reason: DRUGHIGH

## 2024-04-15 ENCOUNTER — TELEPHONE (OUTPATIENT)
Dept: PRIMARY CARE | Facility: CLINIC | Age: 84
End: 2024-04-15
Payer: MEDICARE

## 2024-04-15 DIAGNOSIS — I50.22 CHRONIC SYSTOLIC CONGESTIVE HEART FAILURE (MULTI): Primary | ICD-10-CM

## 2024-04-15 DIAGNOSIS — Z86.73 HISTORY OF TIA (TRANSIENT ISCHEMIC ATTACK): ICD-10-CM

## 2024-04-15 NOTE — TELEPHONE ENCOUNTER
Spoke with wife Radha and patient yesterday.Will try Ortega voiding trial Tomorrow AM with Home health nurse removing Ortega in AM 4/16 give 6-8 hours to urinate( hydration reviewed) if unable then Ortega needs reinserted by Home health nurse then patient will continue Ortega if unsuccessful voiding trial until seen by Urology  4/24 per wife has apt. Fax order for above to Home Health nurse.

## 2024-04-17 ENCOUNTER — TELEPHONE (OUTPATIENT)
Dept: PRIMARY CARE | Facility: CLINIC | Age: 84
End: 2024-04-17
Payer: MEDICARE

## 2024-04-17 NOTE — TELEPHONE ENCOUNTER
Spouse advising pt had catheter removed yesterday but it was not successful, so had to reinsert.  Has appt with Dr Pacheco tomorrow.

## 2024-04-23 ENCOUNTER — HOSPITAL ENCOUNTER (EMERGENCY)
Facility: HOSPITAL | Age: 84
Discharge: HOME | End: 2024-04-24
Attending: STUDENT IN AN ORGANIZED HEALTH CARE EDUCATION/TRAINING PROGRAM
Payer: MEDICARE

## 2024-04-23 ENCOUNTER — APPOINTMENT (OUTPATIENT)
Dept: PHARMACY | Facility: HOSPITAL | Age: 84
End: 2024-04-23
Payer: MEDICARE

## 2024-04-23 DIAGNOSIS — N39.0 URINARY TRACT INFECTION ASSOCIATED WITH INDWELLING URETHRAL CATHETER, INITIAL ENCOUNTER (CMS-HCC): ICD-10-CM

## 2024-04-23 DIAGNOSIS — T83.511A URINARY TRACT INFECTION ASSOCIATED WITH INDWELLING URETHRAL CATHETER, INITIAL ENCOUNTER (CMS-HCC): ICD-10-CM

## 2024-04-23 DIAGNOSIS — R33.8 ACUTE URINARY RETENTION: Primary | ICD-10-CM

## 2024-04-23 LAB
APPEARANCE UR: ABNORMAL
BACTERIA #/AREA URNS AUTO: ABNORMAL /HPF
BILIRUB UR STRIP.AUTO-MCNC: NEGATIVE MG/DL
CAOX CRY #/AREA UR COMP ASSIST: ABNORMAL /HPF
COLOR UR: YELLOW
GLUCOSE UR STRIP.AUTO-MCNC: NORMAL MG/DL
KETONES UR STRIP.AUTO-MCNC: NEGATIVE MG/DL
LEUKOCYTE ESTERASE UR QL STRIP.AUTO: ABNORMAL
MUCOUS THREADS #/AREA URNS AUTO: ABNORMAL /LPF
NITRITE UR QL STRIP.AUTO: ABNORMAL
PH UR STRIP.AUTO: 5.5 [PH]
PROT UR STRIP.AUTO-MCNC: ABNORMAL MG/DL
RBC # UR STRIP.AUTO: ABNORMAL /UL
RBC #/AREA URNS AUTO: ABNORMAL /HPF
SP GR UR STRIP.AUTO: 1.03
UROBILINOGEN UR STRIP.AUTO-MCNC: NORMAL MG/DL
WBC #/AREA URNS AUTO: >50 /HPF
WBC CLUMPS #/AREA URNS AUTO: ABNORMAL /HPF

## 2024-04-23 PROCEDURE — 81001 URINALYSIS AUTO W/SCOPE: CPT | Performed by: STUDENT IN AN ORGANIZED HEALTH CARE EDUCATION/TRAINING PROGRAM

## 2024-04-23 PROCEDURE — 2500000005 HC RX 250 GENERAL PHARMACY W/O HCPCS: Performed by: STUDENT IN AN ORGANIZED HEALTH CARE EDUCATION/TRAINING PROGRAM

## 2024-04-23 PROCEDURE — 51702 INSERT TEMP BLADDER CATH: CPT

## 2024-04-23 PROCEDURE — 99283 EMERGENCY DEPT VISIT LOW MDM: CPT | Mod: 25

## 2024-04-23 RX ORDER — CEPHALEXIN 500 MG/1
500 CAPSULE ORAL ONCE
Status: COMPLETED | OUTPATIENT
Start: 2024-04-23 | End: 2024-04-24

## 2024-04-23 RX ORDER — LIDOCAINE HYDROCHLORIDE 20 MG/ML
1 JELLY TOPICAL ONCE
Status: COMPLETED | OUTPATIENT
Start: 2024-04-23 | End: 2024-04-23

## 2024-04-23 RX ADMIN — LIDOCAINE HYDROCHLORIDE 1 APPLICATION: 20 JELLY TOPICAL at 21:55

## 2024-04-23 ASSESSMENT — PAIN - FUNCTIONAL ASSESSMENT
PAIN_FUNCTIONAL_ASSESSMENT: 0-10
PAIN_FUNCTIONAL_ASSESSMENT: 0-10

## 2024-04-23 ASSESSMENT — COLUMBIA-SUICIDE SEVERITY RATING SCALE - C-SSRS
6. HAVE YOU EVER DONE ANYTHING, STARTED TO DO ANYTHING, OR PREPARED TO DO ANYTHING TO END YOUR LIFE?: NO
2. HAVE YOU ACTUALLY HAD ANY THOUGHTS OF KILLING YOURSELF?: NO
1. IN THE PAST MONTH, HAVE YOU WISHED YOU WERE DEAD OR WISHED YOU COULD GO TO SLEEP AND NOT WAKE UP?: NO

## 2024-04-23 ASSESSMENT — PAIN DESCRIPTION - PROGRESSION: CLINICAL_PROGRESSION: NOT CHANGED

## 2024-04-24 ENCOUNTER — TELEPHONE (OUTPATIENT)
Dept: PRIMARY CARE | Facility: CLINIC | Age: 84
End: 2024-04-24

## 2024-04-24 ENCOUNTER — TELEPHONE (OUTPATIENT)
Dept: CARDIOLOGY | Facility: CLINIC | Age: 84
End: 2024-04-24

## 2024-04-24 ENCOUNTER — APPOINTMENT (OUTPATIENT)
Dept: PHARMACY | Facility: HOSPITAL | Age: 84
End: 2024-04-24
Payer: MEDICARE

## 2024-04-24 VITALS
TEMPERATURE: 97.9 F | DIASTOLIC BLOOD PRESSURE: 89 MMHG | OXYGEN SATURATION: 99 % | HEIGHT: 77 IN | RESPIRATION RATE: 18 BRPM | WEIGHT: 205 LBS | SYSTOLIC BLOOD PRESSURE: 129 MMHG | BODY MASS INDEX: 24.21 KG/M2 | HEART RATE: 85 BPM

## 2024-04-24 PROCEDURE — 2500000001 HC RX 250 WO HCPCS SELF ADMINISTERED DRUGS (ALT 637 FOR MEDICARE OP): Performed by: STUDENT IN AN ORGANIZED HEALTH CARE EDUCATION/TRAINING PROGRAM

## 2024-04-24 RX ORDER — CEPHALEXIN 500 MG/1
500 CAPSULE ORAL 4 TIMES DAILY
Qty: 40 CAPSULE | Refills: 0 | Status: SHIPPED | OUTPATIENT
Start: 2024-04-24 | End: 2024-05-04

## 2024-04-24 RX ADMIN — CEPHALEXIN 500 MG: 500 CAPSULE ORAL at 00:11

## 2024-04-24 ASSESSMENT — PAIN SCALES - GENERAL: PAINLEVEL_OUTOF10: 0 - NO PAIN

## 2024-04-24 NOTE — DISCHARGE INSTRUCTIONS
Please keep your appointment with urology tomorrow.  I have prescribed you antibiotics which I recommend that you  tomorrow morning and start taking for a urinary tract infection that we found today.  If you have any other concerns, you can return to emergency department for recheck.

## 2024-04-24 NOTE — TELEPHONE ENCOUNTER
I gave Willie a verbal to continue home care PT due to still having a catheter and dizziness.  Home care will now be extended 3 weeks 2 times a week.  Dr. Fernandez is aware.

## 2024-04-24 NOTE — ED PROVIDER NOTES
HPI   Chief Complaint   Patient presents with    Difficulty Urinating     Pt states that he had knee surgery three weeks ago and had a catheter placed.  Pt states he had the cath removed recently and has had difficulty urinating.  Pt states he has only been able to produce a few drops of urine today.       83-year-old male presents with urinary retention.  Patient states he has been having difficulty urinating since having a knee replacement surgery approximately 3 weeks ago.  He had a catheter placed at that time and has had several removals and reinsertion's of indwelling Ortega catheters due to unsuccessful void trials.  Today he had the catheter removed at approximately 830 this morning.  He saw a urologist in the office and was unsuccessful in voiding.  The urologist gave him a recommended timeline of either return to the clinic, return to emergency department, or waiting till tomorrow morning to see if he was able to successfully void.  After having several urges to go but being unsuccessful, he decided to come to emergency department for evaluation.  He denies any significant discomfort.  No recent fevers or chills.                          No data recorded                   Patient History   Past Medical History:   Diagnosis Date    Benign essential hypertension 04/04/2022    Benign prostatic hyperplasia without urinary obstruction 12/01/2023    Bilateral inguinal hernia without obstruction or gangrene 09/13/2023    CHF (congestive heart failure) (Multi)     Chronic atrial fibrillation (Multi) 09/13/2023    Congestive heart failure (Multi) 12/01/2023    EF 55% 4/22 borderline dilation.    DDD (degenerative disc disease), lumbar     Degenerative joint disease 09/13/2023    Eczema     Essential tremor     History of polymyalgia rheumatica 03/20/2024    DX 6/23 tx 6 months, off Prednisone end of 12/23 inflammatory markers normal.    History of TIA (transient ischemic attack) 09/13/2023    IFG (impaired fasting  glucose)     Onychomycosis     Osteoarthritis     Osteoarthritis of both knees 03/18/2021    Other conditions influencing health status     Nephrolithiasis    Polymyalgia rheumatica (Multi)     Spondylosis without myelopathy or radiculopathy, lumbosacral region     Unspecified abdominal hernia without obstruction or gangrene     Hernia     Past Surgical History:   Procedure Laterality Date    CATARACT EXTRACTION Left     OTHER SURGICAL HISTORY      Hernia repair     Family History   Problem Relation Name Age of Onset    No Known Problems Mother      Colon cancer Father       Social History     Tobacco Use    Smoking status: Never     Passive exposure: Never    Smokeless tobacco: Never   Vaping Use    Vaping status: Never Used   Substance Use Topics    Alcohol use: Yes     Alcohol/week: 2.0 - 3.0 standard drinks of alcohol     Types: 2 - 3 Glasses of wine per week     Comment: wine    Drug use: Never       Physical Exam   ED Triage Vitals [04/23/24 2203]   Temperature Heart Rate Respirations BP   36.6 °C (97.9 °F) 86 16 125/85      Pulse Ox Temp Source Heart Rate Source Patient Position   96 % Oral Monitor Sitting      BP Location FiO2 (%)     Right arm --       Physical Exam  Vitals and nursing note reviewed.   Constitutional:       General: He is not in acute distress.     Appearance: He is not ill-appearing.   HENT:      Head: Normocephalic and atraumatic.      Mouth/Throat:      Mouth: Mucous membranes are moist.      Pharynx: Oropharynx is clear.   Eyes:      Extraocular Movements: Extraocular movements intact.      Conjunctiva/sclera: Conjunctivae normal.      Pupils: Pupils are equal, round, and reactive to light.   Cardiovascular:      Rate and Rhythm: Normal rate and regular rhythm.   Pulmonary:      Effort: Pulmonary effort is normal. No respiratory distress.      Breath sounds: Normal breath sounds.   Abdominal:      General: There is no distension.      Palpations: Abdomen is soft.      Tenderness: There  is no abdominal tenderness.   Musculoskeletal:         General: No swelling or deformity. Normal range of motion.      Cervical back: Normal range of motion and neck supple.   Skin:     General: Skin is warm and dry.      Capillary Refill: Capillary refill takes less than 2 seconds.   Neurological:      General: No focal deficit present.      Mental Status: He is alert and oriented to person, place, and time. Mental status is at baseline.   Psychiatric:         Mood and Affect: Mood normal.         Behavior: Behavior normal.         ED Course & MDM   Diagnoses as of 04/24/24 0024   Acute urinary retention   Urinary tract infection associated with indwelling urethral catheter, initial encounter (CMS-HCC)       Medical Decision Making  83 oh male presents with urinary retention.  Considered acute urinary retention, constipation, neurogenic bladder.  Patient with history of urinary retention has had several catheter placements.  Catheter placed here in the emergency department with immediate relief in pain.  UA positive for signs of infection, will treat with antibiotics.  Patient instructed to follow-up with his urologist on outpatient basis to assess for possible removal of the urinary catheter.  Patient afebrile, normal vital signs.  Patient stable for discharge at this time.        Procedure  Procedures     Sneha Lake MD  04/30/24 0716

## 2024-04-26 DIAGNOSIS — N40.1 BPH WITH OBSTRUCTION/LOWER URINARY TRACT SYMPTOMS: Primary | ICD-10-CM

## 2024-04-26 DIAGNOSIS — N13.8 BPH WITH OBSTRUCTION/LOWER URINARY TRACT SYMPTOMS: Primary | ICD-10-CM

## 2024-04-29 PROCEDURE — 87086 URINE CULTURE/COLONY COUNT: CPT

## 2024-04-30 ENCOUNTER — LAB REQUISITION (OUTPATIENT)
Dept: LAB | Facility: HOSPITAL | Age: 84
End: 2024-04-30
Payer: MEDICARE

## 2024-04-30 DIAGNOSIS — R82.81 PYURIA: ICD-10-CM

## 2024-05-01 LAB — BACTERIA UR CULT: NO GROWTH

## 2024-05-02 ENCOUNTER — TELEMEDICINE CLINICAL SUPPORT (OUTPATIENT)
Dept: PREADMISSION TESTING | Facility: HOSPITAL | Age: 84
End: 2024-05-02
Payer: MEDICARE

## 2024-05-02 ENCOUNTER — DOCUMENTATION (OUTPATIENT)
Dept: CARDIOLOGY | Facility: CLINIC | Age: 84
End: 2024-05-02
Payer: MEDICARE

## 2024-05-02 VITALS — BODY MASS INDEX: 24.21 KG/M2 | HEIGHT: 77 IN | WEIGHT: 205 LBS

## 2024-05-02 NOTE — PREPROCEDURE INSTRUCTIONS
Current Medications   Medication Instructions    atorvastatin (Lipitor) 10 mg tablet Take night before surgery    cephalexin (Keflex) 500 mg capsule Take morning of surgery with sip of water, no other fluids    cholecalciferol (Vitamin D-3) 25 MCG (1000 UT) capsule Do not take  tonight or day of surgery    digoxin (Lanoxin) 125 MCG tablet Take morning of surgery with sip of water, no other fluids    Eliquis 5 mg tablet Last dose taken 4/29/24 per Dr. Castaneda's instructions    metoprolol tartrate (Lopressor) 25 mg tablet Instructed to hold day of surgery by Dr. Castaneda    midodrine (Proamatine) 5 mg tablet Take morning of surgery with sip of water, no other fluids    tamsulosin (Flomax) 0.4 mg 24 hr capsule Take morning of surgery with sip of water, no other fluids    vitamins A,C,E-zinc-copper (ICaps AREDS) 4,296 mcg-226 mg-90 mg capsule Stop today and do not take tomorrow                       NPO Instructions:    Do not eat any food after midnight the night before your surgery/procedure.    Additional Instructions:     The Day before Surgery:  Review your medication instructions, stop indicated medications  You will be contacted regarding the time of your arrival to facility and surgery time  Do not eat any food after Midnight  Day of Surgery:  Review your medication instructions, take indicated medications  Wear  comfortable loose fitting clothing  Do not use moisturizers, creams, lotions or perfume  All jewelry and valuables should be left at home  PAT PRE-OPERATIVE INSTRUCTIONS    [facility]  [address] [facility] [place] [address]  [phone]    Please let your surgeon know if:      You develop:  Open sores, shingles, burning or painful urination as these may increase your risk of an infection.   Fever=100.4 or greater   New or worsening cold or flu symptoms ( cough, shortness of breath, sore throat, respiratory distress, headache, fatigue, GI symptoms)   You no longer wish to  have the surgery.   Any other personal circumstances change that may lead to the need to cancel or defer this surgery-such as being sick or getting admitted to any hospital within one week of your planned procedure.    Your contact details change, such as a change of address or phone number.    Starting now:     Please DO NOT drink alcohol or smoke for 24 hours before surgery. It is well known that quitting smoking can make a huge difference to your health and recovery from surgery. The longer you abstain from smoking, the better your chances of a healthy recovery. If you need help with quitting, call 1800-QUIT-NOW to be connected to a trained counselor who will discuss the best methods to help you quit.     Before your surgery:    Please stop all supplements/ vitamins 7 days prior to surgery (or as directed by your surgeon).   Please stop taking NSAID pain medicine such as Advil, Ibuprofen, and Motrin 7 days before surgery.    If you develop any fever, cough, cold, rashes, cuts, scratches, scrapes, urinary symptoms or infection anywhere on your body (including teeth and gums) prior to surgery, please call your surgeon’s office as soon as possible. This may require treatment to reduce the chance of cancellation on the day of surgery.    The day before your surgery:   DIET- Do not eat any food after MIDNIGHT.   Get a good night’s rest.  Use the special soap for bathing if you have been instructed to use one.    Scheduled surgery times may change and you will be notified if this occurs - please check your personal voicemail for any updates.     On the morning of surgery:   Wear comfortable, loose fitting clothes which open in the front.   Shower and please do not wear moisturizers, creams, lotions, deodorants, makeup or perfume.    Please bring with you to surgery:   Photo ID and insurance card   Current list of medicines and allergies   Pacemaker/ Defibrillator/Heart stent cards as well as remote controls for  implanted devices    CPAP machine and mask    Slings/ splints/ crutches   A copy of your complete advanced directive/DHPOA.    Please do NOT bring with you to surgery:   All jewelry and valuables should be left at home.   Prosthetic devices such as contact lenses, glasses, hearing aids, dentures, eyelash extensions, hairpins and body piercings must be removed prior to going in to the surgical suite. If you have a case for these items, please bring it with you on surgery day.    *Patients under the age of 18: A responsible adult must be present and remaining in the building throughout the surgical visit.    After outpatient surgery:   A responsible adult MUST accompany you at the time of discharge and stay with you for 24 hours after your surgery. You may NOT drive yourself home after surgery.    Do not drive, operate machinery, make critical decisions or do activities that require co-ordination or balance until after a night’s sleep.   Do not drink alcoholic beverages for 24 hours.   Instructions for resuming your medications will be provided by your surgeon.    CALL YOUR DOCTOR AFTER SURGERY IF YOU HAVE:     Chills and/or a fever of 101° F or higher.    Redness, swelling, pus or drainage from your surgical wound or a bad smell from the wound.    Lightheadedness, fainting or confusion.    Persistent vomiting (throwing up) and are not able to eat or drink for 12 hours.    Three or more loose, watery bowel movements in 24 hours (diarrhea).   Difficulty or pain while urinating( after non-urological surgery)    Pain and swelling in your legs, especially if it is only on one side.    Difficulty breathing or are breathing faster than normal.    Any new concerning symptoms.      Reviewed pre-op instructions with patient, states understanding and denies further questions at this time.      Take Care

## 2024-05-02 NOTE — PERIOPERATIVE NURSING NOTE
5/2/24 Prescreening call completed.  Secure chat to Dr. Castaneda, Dr. Nails, dr. Nur about pt's hx chronic afib, unable to void since TKR on 4/1/24.  C/C by Dr. Castaneda 3/20/24, update note by Dr. Castaneda dated today for stoppage of eliquis on 4/29 and no metoprolol day of surgery.  Dr. Nur aware of this and pt request for not the same anesthesia due to current problem. Yadira LUNDY

## 2024-05-02 NOTE — PROGRESS NOTES
He is to have laser surgery on the prostate on 5/3/2023.  Have asked him to hold the eliquis since 4/29 and to hold his metoprolol the am of the procedure.  He does have orthostatic hypotension and need to watch the BP and will accept higher BP afterwards.

## 2024-05-03 ENCOUNTER — ANESTHESIA EVENT (OUTPATIENT)
Dept: OPERATING ROOM | Facility: HOSPITAL | Age: 84
End: 2024-05-03
Payer: MEDICARE

## 2024-05-03 ENCOUNTER — ANESTHESIA (OUTPATIENT)
Dept: OPERATING ROOM | Facility: HOSPITAL | Age: 84
End: 2024-05-03
Payer: MEDICARE

## 2024-05-03 ENCOUNTER — HOSPITAL ENCOUNTER (OUTPATIENT)
Facility: HOSPITAL | Age: 84
Setting detail: OUTPATIENT SURGERY
Discharge: HOME | End: 2024-05-03
Attending: UROLOGY | Admitting: UROLOGY
Payer: MEDICARE

## 2024-05-03 VITALS
RESPIRATION RATE: 20 BRPM | HEIGHT: 77 IN | DIASTOLIC BLOOD PRESSURE: 68 MMHG | TEMPERATURE: 97.3 F | BODY MASS INDEX: 22.83 KG/M2 | OXYGEN SATURATION: 99 % | SYSTOLIC BLOOD PRESSURE: 129 MMHG | HEART RATE: 84 BPM | WEIGHT: 193.34 LBS

## 2024-05-03 DIAGNOSIS — R33.9 URINE RETENTION: ICD-10-CM

## 2024-05-03 DIAGNOSIS — N13.8 BPH WITH URINARY OBSTRUCTION: ICD-10-CM

## 2024-05-03 DIAGNOSIS — I95.1 ORTHOSTATIC HYPOTENSION: ICD-10-CM

## 2024-05-03 DIAGNOSIS — N40.1 BPH WITH URINARY OBSTRUCTION: ICD-10-CM

## 2024-05-03 DIAGNOSIS — R33.8 ACUTE URINARY RETENTION: ICD-10-CM

## 2024-05-03 DIAGNOSIS — I48.21 PERMANENT ATRIAL FIBRILLATION (MULTI): ICD-10-CM

## 2024-05-03 PROCEDURE — 7100000001 HC RECOVERY ROOM TIME - INITIAL BASE CHARGE: Performed by: UROLOGY

## 2024-05-03 PROCEDURE — 99100 ANES PT EXTEME AGE<1 YR&>70: CPT | Performed by: ANESTHESIOLOGY

## 2024-05-03 PROCEDURE — 7100000009 HC PHASE TWO TIME - INITIAL BASE CHARGE: Performed by: UROLOGY

## 2024-05-03 PROCEDURE — C1889 IMPLANT/INSERT DEVICE, NOC: HCPCS | Performed by: UROLOGY

## 2024-05-03 PROCEDURE — 87086 URINE CULTURE/COLONY COUNT: CPT | Mod: BEALAB | Performed by: UROLOGY

## 2024-05-03 PROCEDURE — 3600000010 HC OR TIME - EACH INCREMENTAL 1 MINUTE - PROCEDURE LEVEL FIVE: Performed by: UROLOGY

## 2024-05-03 PROCEDURE — 2500000005 HC RX 250 GENERAL PHARMACY W/O HCPCS: Performed by: UROLOGY

## 2024-05-03 PROCEDURE — 2500000005 HC RX 250 GENERAL PHARMACY W/O HCPCS: Performed by: NURSE ANESTHETIST, CERTIFIED REGISTERED

## 2024-05-03 PROCEDURE — 3600000005 HC OR TIME - INITIAL BASE CHARGE - PROCEDURE LEVEL FIVE: Performed by: UROLOGY

## 2024-05-03 PROCEDURE — A52648 PR LASER VAPORIZATION SURGERY PROSTATE, COMPLETE: Performed by: ANESTHESIOLOGY

## 2024-05-03 PROCEDURE — 2500000004 HC RX 250 GENERAL PHARMACY W/ HCPCS (ALT 636 FOR OP/ED): Mod: JZ | Performed by: UROLOGY

## 2024-05-03 PROCEDURE — A52648 PR LASER VAPORIZATION SURGERY PROSTATE, COMPLETE: Performed by: NURSE ANESTHETIST, CERTIFIED REGISTERED

## 2024-05-03 PROCEDURE — 2720000007 HC OR 272 NO HCPCS: Performed by: UROLOGY

## 2024-05-03 PROCEDURE — 3700000002 HC GENERAL ANESTHESIA TIME - EACH INCREMENTAL 1 MINUTE: Performed by: UROLOGY

## 2024-05-03 PROCEDURE — 7100000002 HC RECOVERY ROOM TIME - EACH INCREMENTAL 1 MINUTE: Performed by: UROLOGY

## 2024-05-03 PROCEDURE — 2500000004 HC RX 250 GENERAL PHARMACY W/ HCPCS (ALT 636 FOR OP/ED): Performed by: NURSE ANESTHETIST, CERTIFIED REGISTERED

## 2024-05-03 PROCEDURE — 3700000001 HC GENERAL ANESTHESIA TIME - INITIAL BASE CHARGE: Performed by: UROLOGY

## 2024-05-03 PROCEDURE — 7100000010 HC PHASE TWO TIME - EACH INCREMENTAL 1 MINUTE: Performed by: UROLOGY

## 2024-05-03 RX ORDER — DIPHENHYDRAMINE HYDROCHLORIDE 50 MG/ML
12.5 INJECTION INTRAMUSCULAR; INTRAVENOUS ONCE AS NEEDED
Status: DISCONTINUED | OUTPATIENT
Start: 2024-05-03 | End: 2024-05-03 | Stop reason: HOSPADM

## 2024-05-03 RX ORDER — CEFAZOLIN SODIUM 2 G/100ML
2 INJECTION, SOLUTION INTRAVENOUS ONCE
Status: COMPLETED | OUTPATIENT
Start: 2024-05-03 | End: 2024-05-03

## 2024-05-03 RX ORDER — ONDANSETRON HYDROCHLORIDE 2 MG/ML
4 INJECTION, SOLUTION INTRAVENOUS ONCE AS NEEDED
Status: DISCONTINUED | OUTPATIENT
Start: 2024-05-03 | End: 2024-05-03 | Stop reason: HOSPADM

## 2024-05-03 RX ORDER — HYDRALAZINE HYDROCHLORIDE 20 MG/ML
5 INJECTION INTRAMUSCULAR; INTRAVENOUS EVERY 30 MIN PRN
Status: DISCONTINUED | OUTPATIENT
Start: 2024-05-03 | End: 2024-05-03 | Stop reason: HOSPADM

## 2024-05-03 RX ORDER — OXYCODONE HYDROCHLORIDE 5 MG/1
5 TABLET ORAL EVERY 4 HOURS PRN
Status: DISCONTINUED | OUTPATIENT
Start: 2024-05-03 | End: 2024-05-03 | Stop reason: HOSPADM

## 2024-05-03 RX ORDER — LIDOCAINE HYDROCHLORIDE 10 MG/ML
INJECTION, SOLUTION EPIDURAL; INFILTRATION; INTRACAUDAL; PERINEURAL AS NEEDED
Status: DISCONTINUED | OUTPATIENT
Start: 2024-05-03 | End: 2024-05-03

## 2024-05-03 RX ORDER — MEPERIDINE HYDROCHLORIDE 25 MG/ML
12.5 INJECTION INTRAMUSCULAR; INTRAVENOUS; SUBCUTANEOUS EVERY 10 MIN PRN
Status: DISCONTINUED | OUTPATIENT
Start: 2024-05-03 | End: 2024-05-03 | Stop reason: HOSPADM

## 2024-05-03 RX ORDER — IPRATROPIUM BROMIDE 0.5 MG/2.5ML
500 SOLUTION RESPIRATORY (INHALATION) ONCE
Status: DISCONTINUED | OUTPATIENT
Start: 2024-05-03 | End: 2024-05-03 | Stop reason: HOSPADM

## 2024-05-03 RX ORDER — SODIUM CHLORIDE, SODIUM LACTATE, POTASSIUM CHLORIDE, CALCIUM CHLORIDE 600; 310; 30; 20 MG/100ML; MG/100ML; MG/100ML; MG/100ML
INJECTION, SOLUTION INTRAVENOUS CONTINUOUS PRN
Status: DISCONTINUED | OUTPATIENT
Start: 2024-05-03 | End: 2024-05-03

## 2024-05-03 RX ORDER — FENTANYL CITRATE 50 UG/ML
INJECTION, SOLUTION INTRAMUSCULAR; INTRAVENOUS AS NEEDED
Status: DISCONTINUED | OUTPATIENT
Start: 2024-05-03 | End: 2024-05-03

## 2024-05-03 RX ORDER — ONDANSETRON HYDROCHLORIDE 2 MG/ML
INJECTION, SOLUTION INTRAVENOUS AS NEEDED
Status: DISCONTINUED | OUTPATIENT
Start: 2024-05-03 | End: 2024-05-03

## 2024-05-03 RX ORDER — PROPOFOL 10 MG/ML
INJECTION, EMULSION INTRAVENOUS AS NEEDED
Status: DISCONTINUED | OUTPATIENT
Start: 2024-05-03 | End: 2024-05-03

## 2024-05-03 RX ORDER — FENTANYL CITRATE 50 UG/ML
25 INJECTION, SOLUTION INTRAMUSCULAR; INTRAVENOUS EVERY 5 MIN PRN
Status: DISCONTINUED | OUTPATIENT
Start: 2024-05-03 | End: 2024-05-03 | Stop reason: HOSPADM

## 2024-05-03 RX ORDER — LIDOCAINE HYDROCHLORIDE 20 MG/ML
1 JELLY TOPICAL ONCE
Qty: 1 ML | Refills: 0 | Status: SHIPPED | OUTPATIENT
Start: 2024-05-03 | End: 2024-05-03

## 2024-05-03 RX ORDER — LIDOCAINE HYDROCHLORIDE 20 MG/ML
1 JELLY TOPICAL ONCE
Status: COMPLETED | OUTPATIENT
Start: 2024-05-03 | End: 2024-05-03

## 2024-05-03 RX ORDER — ALBUTEROL SULFATE 0.83 MG/ML
2.5 SOLUTION RESPIRATORY (INHALATION) ONCE AS NEEDED
Status: DISCONTINUED | OUTPATIENT
Start: 2024-05-03 | End: 2024-05-03 | Stop reason: HOSPADM

## 2024-05-03 RX ADMIN — CEFAZOLIN SODIUM 2 G: 2 INJECTION, SOLUTION INTRAVENOUS at 07:39

## 2024-05-03 RX ADMIN — DEXAMETHASONE SODIUM PHOSPHATE 4 MG: 4 INJECTION, SOLUTION INTRAMUSCULAR; INTRAVENOUS at 07:47

## 2024-05-03 RX ADMIN — PROPOFOL 50 MG: 10 INJECTION, EMULSION INTRAVENOUS at 08:23

## 2024-05-03 RX ADMIN — FENTANYL CITRATE 50 MCG: 50 INJECTION INTRAMUSCULAR; INTRAVENOUS at 07:58

## 2024-05-03 RX ADMIN — ONDANSETRON 4 MG: 2 INJECTION, SOLUTION INTRAMUSCULAR; INTRAVENOUS at 07:47

## 2024-05-03 RX ADMIN — FENTANYL CITRATE 50 MCG: 50 INJECTION INTRAMUSCULAR; INTRAVENOUS at 08:14

## 2024-05-03 RX ADMIN — PROPOFOL 150 MG: 10 INJECTION, EMULSION INTRAVENOUS at 07:41

## 2024-05-03 RX ADMIN — SODIUM CHLORIDE, POTASSIUM CHLORIDE, SODIUM LACTATE AND CALCIUM CHLORIDE: 600; 310; 30; 20 INJECTION, SOLUTION INTRAVENOUS at 07:38

## 2024-05-03 RX ADMIN — LIDOCAINE HYDROCHLORIDE 5 ML: 10 INJECTION, SOLUTION EPIDURAL; INFILTRATION; INTRACAUDAL; PERINEURAL at 07:41

## 2024-05-03 RX ADMIN — LIDOCAINE HYDROCHLORIDE 1 APPLICATION: 20 JELLY TOPICAL at 10:20

## 2024-05-03 RX ADMIN — FENTANYL CITRATE 100 MCG: 50 INJECTION INTRAMUSCULAR; INTRAVENOUS at 07:45

## 2024-05-03 SDOH — HEALTH STABILITY: MENTAL HEALTH: CURRENT SMOKER: 0

## 2024-05-03 ASSESSMENT — PAIN - FUNCTIONAL ASSESSMENT
PAIN_FUNCTIONAL_ASSESSMENT: 0-10
PAIN_FUNCTIONAL_ASSESSMENT: WONG-BAKER FACES
PAIN_FUNCTIONAL_ASSESSMENT: 0-10

## 2024-05-03 ASSESSMENT — PAIN SCALES - GENERAL
PAINLEVEL_OUTOF10: 0 - NO PAIN
PAIN_LEVEL: 0
PAINLEVEL_OUTOF10: 0 - NO PAIN

## 2024-05-03 NOTE — PERIOPERATIVE NURSING NOTE
Patient in Phase 2; dressed and up to chair with RN assist. Tolerating po fluids, Patient complaining of burning at tip of penis.  Dr. Payan notified and he is ordering lidocaine  gel to be given before patient leaves.   No complaint of pain and no complaint of nausea.      Family at bedside; discussed discharge instructions with patient and Family. All questions at this time answered. Educated both on how to care fore catheter and both are quite familiar with how to use it.  Supplies were given.

## 2024-05-03 NOTE — H&P
History Of Present Illness  Toni Mckinney is a 83 y.o. male presenting with urinary retention.     Past Medical History  Past Medical History:   Diagnosis Date    Adverse effect of anesthesia     had lightheadedness, unable to void, hypotension    Benign essential hypertension 04/04/2022    Benign prostatic hyperplasia without urinary obstruction 12/01/2023    Bilateral inguinal hernia without obstruction or gangrene 09/13/2023    CHF (congestive heart failure) (Multi)     Chronic atrial fibrillation (Multi) 09/13/2023    Congestive heart failure (Multi) 12/01/2023    EF 55% 4/22 borderline dilation.    DDD (degenerative disc disease), lumbar     Degenerative joint disease 09/13/2023    Eczema     Essential tremor     History of polymyalgia rheumatica 03/20/2024    DX 6/23 tx 6 months, off Prednisone end of 12/23 inflammatory markers normal.    History of TIA (transient ischemic attack) 09/13/2023    IFG (impaired fasting glucose)     Nephrolithiasis     Onychomycosis     Osteoarthritis     Osteoarthritis of both knees 03/18/2021    Other conditions influencing health status     Nephrolithiasis    Polymyalgia rheumatica (Multi)     Spondylosis without myelopathy or radiculopathy, lumbosacral region     Unspecified abdominal hernia without obstruction or gangrene     Hernia       Surgical History  Past Surgical History:   Procedure Laterality Date    CATARACT EXTRACTION Left     KNEE ARTHROPLASTY Right     4/1/24    OTHER SURGICAL HISTORY Left     Hernia repair        Social History  He reports that he has never smoked. He has never been exposed to tobacco smoke. He has never used smokeless tobacco. He reports current alcohol use of about 5.0 - 6.0 standard drinks of alcohol per week. He reports that he does not use drugs.    Family History  Family History   Problem Relation Name Age of Onset    No Known Problems Mother      Colon cancer Father          Allergies  Furosemide    Review of Systems     Physical  "Exam  Alert, oriented  Normal resp effort  Abdomen soft, non tender     Last Recorded Vitals  Blood pressure 113/60, pulse 68, temperature 36 °C (96.8 °F), temperature source Temporal, resp. rate 20, height 1.956 m (6' 5\"), weight 87.7 kg (193 lb 5.5 oz), SpO2 96%.    Relevant Results        Reviewed  Culture negative     Assessment/Plan   BPH, retention  [facility] laser vaporization today        Serafin Payan MD    "

## 2024-05-03 NOTE — ANESTHESIA PROCEDURE NOTES
Airway  Date/Time: 5/3/2024 7:44 AM  Urgency: elective    Airway not difficult    Staffing  Performed: CRNA   Authorized by: Wilton Cervantes MD    Performed by: AMRIK Ledezma-CRNA  Patient location during procedure: OR    Indications and Patient Condition  Indications for airway management: anesthesia  Spontaneous ventilation: present  Sedation level: deep  Preoxygenated: yes  Patient position: sniffing  Mask difficulty assessment: 0 - not attempted    Final Airway Details  Final airway type: supraglottic airway      Successful airway: classic  Size 4     Number of attempts at approach: 1

## 2024-05-03 NOTE — ANESTHESIA POSTPROCEDURE EVALUATION
"Patient: Qamar Mckinney \"Toni\"    Procedure Summary       Date: 05/03/24 Room / Location: [facility] / [facility]    Anesthesia Start: 0738 Anesthesia Stop: 0839    Procedure: GREEN LIGHT LASER PROSTATE , KEFZOL 2 GM IV - [facility] Diagnosis:       BPH with urinary obstruction      Urine retention      (BPH N40.1)      (URINE RETENTION R33.9)    Surgeons: Serafin Payan MD Responsible Provider: Wilton Cervantes MD    Anesthesia Type: general ASA Status: 3            Anesthesia Type: general    Vitals Value Taken Time   /79 05/03/24 0845   Temp 36 05/03/24 0939   Pulse 87 05/03/24 0845   Resp 20 05/03/24 0845   SpO2 99 % 05/03/24 0834       Anesthesia Post Evaluation    Patient location during evaluation: PACU  Patient participation: complete - patient participated  Level of consciousness: awake  Pain score: 0  Pain management: adequate  Multimodal analgesia pain management approach  Airway patency: patent  Two or more strategies used to mitigate risk of obstructive sleep apnea  Cardiovascular status: acceptable  Respiratory status: acceptable  Hydration status: acceptable  Postoperative Nausea and Vomiting: none        There were no known notable events for this encounter.    "

## 2024-05-03 NOTE — ANESTHESIA PREPROCEDURE EVALUATION
"Patient: Qamar Mckinney \"Toni\"    Procedure Information       Date/Time: 05/03/24 0365    Procedure: GREEN LIGHT LASER PROSTATE , KEFZOL 2 GM IV - [facility]    Location: [facility] / [facility]    Surgeons: Serafin Payan MD            Relevant Problems   Anesthesia (within normal limits)      Cardiac   (+) Benign essential hypertension   (+) Chronic atrial fibrillation (Multi)   (+) Congestive heart failure (Multi)   (+) Hyperlipidemia, unspecified   (+) Pure hypercholesterolemia      Pulmonary (within normal limits)      Neuro   (+) Stroke (Multi)      GI (within normal limits)      /Renal   (+) Benign prostatic hyperplasia without urinary obstruction      Liver (within normal limits)      Endocrine (within normal limits)      Hematology (within normal limits)      Musculoskeletal   (+) DDD (degenerative disc disease), lumbar   (+) Degenerative joint disease   (+) Osteoarthritis of both knees   (+) Unilateral primary osteoarthritis, right knee      HEENT   (+) Sensorineural hearing loss (SNHL) of both ears      ID   (+) Onychomycosis      Skin   (+) Eczema      GYN (within normal limits)       Clinical information reviewed:   Tobacco  Allergies  Meds   Med Hx  Surg Hx   Fam Hx  Soc Hx      Allergies   Allergen Reactions    Furosemide Other     Lightheadedness, BP Drop     Prior to Admission medications    Medication Sig Start Date End Date Taking? Authorizing Provider   atorvastatin (Lipitor) 10 mg tablet Take 1 tablet (10 mg) by mouth once daily.   Yes Historical Provider, MD   cephalexin (Keflex) 500 mg capsule Take 1 capsule (500 mg) by mouth 4 times a day for 10 days. 4/24/24 5/4/24 Yes Sneha Lake MD   cholecalciferol (Vitamin D-3) 25 MCG (1000 UT) capsule Take 1 capsule (25 mcg) by mouth once daily. TAKE AS DIRECTED   Yes Historical Provider, MD   digoxin (Lanoxin) 125 MCG tablet Take 1 tablet (125 mcg) by mouth once daily. Do not start before April 4, 2024. 4/4/24 5/4/24 Yes " ALYCIA Forman   Eliquis 5 mg tablet TAKE 1 TABLET TWICE A DAY AS DIRECTED  Patient taking differently: Take 1 tablet (5 mg) by mouth 2 times a day. AS DIRECTED  Last dose 4/29/24 10/25/23  Yes Silvano Castaneda MD   HYDROcodone-acetaminophen (Norco) 5-325 mg tablet Take 2 tablets by mouth every 6 hours if needed for moderate pain (4 - 6). 4/2/24  Yes Yonis Silva MD   metoprolol tartrate (Lopressor) 25 mg tablet Take 0.5 tablets (12.5 mg) by mouth 2 times a day.   Yes Historical Provider, MD   midodrine (Proamatine) 5 mg tablet Take 1 tablet (5 mg) by mouth 2 times a day.  Patient taking differently: Take 1 tablet (5 mg) by mouth 3 times a day. 4/13/24 4/13/25 Yes Silvano Castaneda MD   tamsulosin (Flomax) 0.4 mg 24 hr capsule Take 1 capsule (0.4 mg) by mouth once daily. Do not start before April 4, 2024. 4/4/24 5/4/24 Yes ALYCIA Forman   vitamins A,C,E-zinc-copper (ICaps AREDS) 4,296 mcg-226 mg-90 mg capsule Take 1 Capful by mouth 2 times a day. TAKE AS DIRECTED   Yes Historical Provider, MD   polyethylene glycol (Glycolax, Miralax) 17 gram packet Take 17 g by mouth once daily as needed (constipation).  Patient not taking: Reported on 5/2/2024 4/3/24   ALYCIA Forman   HYDROcodone-acetaminophen (Norco) 5-325 mg tablet Take 1 tablet by mouth every 6 hours if needed for moderate pain (4 - 6). 4/3/24 5/2/24  ALYCIA Forman     Past Medical History:   Diagnosis Date    Adverse effect of anesthesia     had lightheadedness, unable to void, hypotension    Benign essential hypertension 04/04/2022    Benign prostatic hyperplasia without urinary obstruction 12/01/2023    Bilateral inguinal hernia without obstruction or gangrene 09/13/2023    CHF (congestive heart failure) (Multi)     Chronic atrial fibrillation (Multi) 09/13/2023    Congestive heart failure (Multi) 12/01/2023    EF 55% 4/22 borderline dilation.    DDD (degenerative disc disease), lumbar     Degenerative joint  disease 09/13/2023    Eczema     Essential tremor     History of polymyalgia rheumatica 03/20/2024    DX 6/23 tx 6 months, off Prednisone end of 12/23 inflammatory markers normal.    History of TIA (transient ischemic attack) 09/13/2023    IFG (impaired fasting glucose)     Nephrolithiasis     Onychomycosis     Osteoarthritis     Osteoarthritis of both knees 03/18/2021    Other conditions influencing health status     Nephrolithiasis    Polymyalgia rheumatica (Multi)     Spondylosis without myelopathy or radiculopathy, lumbosacral region     Unspecified abdominal hernia without obstruction or gangrene     Hernia     Past Surgical History:   Procedure Laterality Date    CATARACT EXTRACTION Left     KNEE ARTHROPLASTY Right     4/1/24    OTHER SURGICAL HISTORY Left     Hernia repair       NPO Detail:  NPO/Void Status  Date of Last Liquid: 05/02/24  Time of Last Liquid: 2130  Date of Last Solid: 05/02/24  Time of Last Solid: 2030  Time of Last Void: 0000 (indwelling catheter)         Physical Exam    Airway  Mallampati: II  TM distance: >3 FB  Neck ROM: full     Cardiovascular - normal exam     Dental - normal exam     Pulmonary - normal exam     Abdominal - normal exam             Anesthesia Plan    History of general anesthesia?: yes  History of complications of general anesthesia?: no    ASA 3     general     The patient is not a current smoker.  Patient was not previously instructed to abstain from smoking on day of procedure.  Patient did not smoke on day of procedure.  Education provided regarding risk of obstructive sleep apnea.  intravenous induction   Anesthetic plan and risks discussed with patient.    Plan discussed with CRNA and CAA.

## 2024-05-03 NOTE — PERIOPERATIVE NURSING NOTE
Patient feeling better and meets criteria for discharge. IV discontinued and patient taken to car via wheelchair

## 2024-05-03 NOTE — OP NOTE
"GREEN LIGHT LASER PROSTATE , KEFZOL 2 GM IV - [facility] Operative Note     Date: 5/3/2024  OR Location: [facility]    Name: Qamar Mckinney \"Toni\", : 1940, Age: 83 y.o., MRN: [id number], Sex: male    Diagnosis  Pre-op Diagnosis     * BPH with urinary obstruction [N40.1, N13.8]     * Urine retention [R33.9] Post-op Diagnosis     * BPH with urinary obstruction [N40.1, N13.8]     * Urine retention [R33.9]     Procedures  GREEN LIGHT LASER PROSTATE , KEFZOL 2 GM IV - [facility]  33816 - [place] LASER VAPORIZATION OF PROSTATE FOR URINE FLOW      Surgeons      * Serafin Payan - Primary    Resident/Fellow/Other Assistant:  Surgeons and Role:  * No surgeons found with a matching role *    Procedure Summary  Anesthesia: General  ASA: III  Anesthesia Staff: Anesthesiologist: Wilton Cervantes MD  CRNA: AMRIK Ledezma-CRNA  Estimated Blood Loss: 30 mL  Intra-op Medications: Administrations occurring from 0745 to 0915 on 24:  * No intraprocedure medications in log *           Anesthesia Record               Intraprocedure I/O Totals       None           Specimen:   ID Type Source Tests Collected by Time   A : URINE CULTURE Fluid URINE CATHETERIZED URINE CULTURE Serafin Payan MD 5/3/2024 0755        Staff:   Circulator: Celia Darden RN  Scrub Person: Marissa Lubin           Drains and/or Catheters: 20 [place] of [place]    Findings: Laser vaporization performed    Indications: Qamar Mckinney \"Toni\" is an 83 y.o. male who is having surgery for BPH N40.1  URINE RETENTION R33.9.  He underwent recent right knee surgery and his postoperative urinary retention.  He has failed several voiding trials.  The elected for laser vaporization of the prostate today.  Risks including infection, bleeding, injury to the urinary tract, dysuria, persistent retention, retrograde ejaculation, erectile dysfunction, and incontinence were reviewed.  Written consent was obtained.    Procedure Details: Patient was taken to the operating room and " given general anesthesia.  He was placed from the modified lithotomy position and sterilely prepped and draped he received preoperative Ancef.  A cystoscope was advanced through the urethra into the bladder.  Laser vaporization was performe from the bladder neck to a point just proximal to the verumontanum.  At the completion the prostatic urethra was open, without obstruction.  He had good hemostasis.  A 20 [place] catheter was inserted with 40 cc in the balloon.  Patient tolerated the procedure and was transferred to the recovery room in stable condition.     He will be discharged with a [place] catheter and will follow-up in 3 days for a voiding trial.      Complications:  None; patient tolerated the procedure well.          Serafin Payan  Phone Number: [phone]

## 2024-05-04 DIAGNOSIS — I48.20 CHRONIC ATRIAL FIBRILLATION (MULTI): ICD-10-CM

## 2024-05-04 DIAGNOSIS — I95.1 ORTHOSTATIC HYPOTENSION: ICD-10-CM

## 2024-05-04 LAB — BACTERIA UR CULT: NO GROWTH

## 2024-05-04 RX ORDER — DIGOXIN 125 MCG
125 TABLET ORAL DAILY
Qty: 90 TABLET | Refills: 3 | Status: SHIPPED | OUTPATIENT
Start: 2024-05-04 | End: 2025-05-04

## 2024-05-04 RX ORDER — MIDODRINE HYDROCHLORIDE 5 MG/1
5 TABLET ORAL 3 TIMES DAILY
Qty: 270 TABLET | Refills: 3 | Status: SHIPPED | OUTPATIENT
Start: 2024-05-04 | End: 2024-05-29 | Stop reason: SDUPTHER

## 2024-05-08 ASSESSMENT — PAIN SCALES - GENERAL: PAINLEVEL_OUTOF10: 0 - NO PAIN

## 2024-05-10 ENCOUNTER — TELEPHONE (OUTPATIENT)
Dept: PRIMARY CARE | Facility: CLINIC | Age: 84
End: 2024-05-10
Payer: MEDICARE

## 2024-05-10 DIAGNOSIS — I48.21 PERMANENT ATRIAL FIBRILLATION (MULTI): ICD-10-CM

## 2024-05-10 NOTE — TELEPHONE ENCOUNTER
Pt being seen for PT for TKA in the home, had another medical procedure and cancelled a visit. Gave verbal to Willie PT with [facility] for an additional visit to do discharge visit for pt to move to outpatient PT.

## 2024-05-13 ENCOUNTER — OFFICE VISIT (OUTPATIENT)
Dept: OTOLARYNGOLOGY | Facility: CLINIC | Age: 84
End: 2024-05-13
Payer: MEDICARE

## 2024-05-13 DIAGNOSIS — H90.3 SENSORINEURAL HEARING LOSS (SNHL) OF BOTH EARS: ICD-10-CM

## 2024-05-13 DIAGNOSIS — H61.23 BILATERAL IMPACTED CERUMEN: Primary | ICD-10-CM

## 2024-05-13 PROCEDURE — 1160F RVW MEDS BY RX/DR IN RCRD: CPT | Performed by: OTOLARYNGOLOGY

## 2024-05-13 PROCEDURE — 1157F ADVNC CARE PLAN IN RCRD: CPT | Performed by: OTOLARYNGOLOGY

## 2024-05-13 PROCEDURE — 99212 OFFICE O/P EST SF 10 MIN: CPT | Performed by: OTOLARYNGOLOGY

## 2024-05-13 PROCEDURE — 1159F MED LIST DOCD IN RCRD: CPT | Performed by: OTOLARYNGOLOGY

## 2024-05-13 NOTE — PROGRESS NOTES
"No chief complaint on file.     Date of Evaluation: 5/13/2024   HPI  Qamar Mckinney \"Toni\" is a 83 y.o. male  with sensorineural hearing loss. Both ears feel plugged. History of recurrent cerumen impactions        Past Medical History:   Diagnosis Date    Adverse effect of anesthesia     had lightheadedness, unable to void, hypotension    Benign essential hypertension 04/04/2022    Benign prostatic hyperplasia without urinary obstruction 12/01/2023    Bilateral inguinal hernia without obstruction or gangrene 09/13/2023    CHF (congestive heart failure) (Multi)     Chronic atrial fibrillation (Multi) 09/13/2023    Congestive heart failure (Multi) 12/01/2023    EF 55% 4/22 borderline dilation.    DDD (degenerative disc disease), lumbar     Degenerative joint disease 09/13/2023    Eczema     Essential tremor     History of polymyalgia rheumatica 03/20/2024    DX 6/23 tx 6 months, off Prednisone end of 12/23 inflammatory markers normal.    History of TIA (transient ischemic attack) 09/13/2023    IFG (impaired fasting glucose)     Nephrolithiasis     Onychomycosis     Osteoarthritis     Osteoarthritis of both knees 03/18/2021    Other conditions influencing health status     Nephrolithiasis    Polymyalgia rheumatica (Multi)     Spondylosis without myelopathy or radiculopathy, lumbosacral region     Unspecified abdominal hernia without obstruction or gangrene     Hernia      Past Surgical History:   Procedure Laterality Date    CATARACT EXTRACTION Left     KNEE ARTHROPLASTY Right     4/1/24    OTHER SURGICAL HISTORY Left     Hernia repair          Medications:   Current Outpatient Medications   Medication Instructions    apixaban (ELIQUIS) 5 mg, oral, 2 times daily, AS DIRECTED    atorvastatin (Lipitor) 10 mg tablet 1 tablet, oral, Daily    cholecalciferol (VITAMIN D-3) 25 mcg, oral, Daily, TAKE AS DIRECTED    digoxin (LANOXIN) 125 mcg, oral, Daily    HYDROcodone-acetaminophen (Norco) 5-325 mg tablet 2 tablets, oral, Every " 6 hours PRN    metoprolol tartrate (Lopressor) 25 mg tablet 0.5 tablets, oral, 2 times daily    midodrine (PROAMATINE) 5 mg, oral, 3 times daily    polyethylene glycol (GLYCOLAX, MIRALAX) 17 g, oral, Daily PRN    vitamins A,C,E-zinc-copper (ICaps AREDS) 4,296 mcg-226 mg-90 mg capsule 1 Capful, oral, 2 times daily, TAKE AS DIRECTED        Allergies:  Allergies   Allergen Reactions    Furosemide Other     Lightheadedness, BP Drop        Physical Exam:  Last Recorded Vitals  There were no vitals taken for this visit.  []General appearance: Well-developed, well-nourished in no acute distress, conversant with normal voice quality    Head/face: No erythema or edema or facial tenderness, and normal facial nerve function bilaterally    External ear: Clear external auditory canals with normal pinnae bilateral large cerumen impactions obstructing the entire ear canal.  Removed with curettes under microscope  Tube status: N/A  Middle ear: Tympanic membranes intact and mobile, middle ears normal.  Tympanic membrane perforation: N/A  Mastoid bowl: N/A  Hearing: Normal conversational awareness at normal speech thresholds    Nose visualized using: Anterior rhinoscopy  Nasal dorsum: Nontraumatic midline appearance  Septum: Midline, nonobstructing  Inferior turbinates: Normal, pink  Secretions: Dry    Oral cavity and oropharynx: Normal  Teeth: Good condition  Floor of mouth: without lesions  Palate: Normal hard palate, soft palate and uvula  Oropharynx: Clear, no lesions present  Buccal mucosa: Normal without masses or lesions  Lips: Normal    Nasopharynx: Inadequate mirror exam secondary to gag/anatomy    Neck:  Salivary glands: Normal bilateral parotid and submandibular glands by inspection and palpation.  Non-thyroid masses: No palpable masses or significant lymphadenopathy  Trachea: Midline  Thyroid: No thyromegaly or palpable nodules  Temporomandibular joint: Nontender  Cervical range of motion: Normal    Neurologic exam: Alert  and oriented x3, appropriate affect.  Cranial nerves II-XII normal bilaterally  Extraocular movement: Extraocular movement intact, normal gaze alignment        Diagnoses and all orders for this visit:  Bilateral impacted cerumen (Primary)  Sensorineural hearing loss (SNHL) of both ears         PLAN  Recommend audiogram update and hearing aid evaluation.  Removal of cerumen.    Madhu Mac MD

## 2024-05-17 DIAGNOSIS — E78.00 PURE HYPERCHOLESTEROLEMIA: Primary | ICD-10-CM

## 2024-05-17 RX ORDER — ATORVASTATIN CALCIUM 10 MG/1
10 TABLET, FILM COATED ORAL DAILY
Qty: 90 TABLET | Refills: 3 | Status: SHIPPED | OUTPATIENT
Start: 2024-05-17 | End: 2025-05-17

## 2024-05-27 DIAGNOSIS — I48.21 PERMANENT ATRIAL FIBRILLATION (MULTI): Primary | ICD-10-CM

## 2024-05-27 RX ORDER — METOPROLOL SUCCINATE 25 MG/1
25 TABLET, EXTENDED RELEASE ORAL DAILY
Qty: 90 TABLET | Refills: 3 | Status: SHIPPED | OUTPATIENT
Start: 2024-05-27 | End: 2025-05-27

## 2024-05-29 DIAGNOSIS — I95.1 ORTHOSTATIC HYPOTENSION: ICD-10-CM

## 2024-05-29 RX ORDER — MIDODRINE HYDROCHLORIDE 5 MG/1
5 TABLET ORAL 3 TIMES DAILY
Qty: 270 TABLET | Refills: 3 | Status: SHIPPED | OUTPATIENT
Start: 2024-05-29 | End: 2025-05-29

## 2024-06-14 ENCOUNTER — APPOINTMENT (OUTPATIENT)
Dept: PRIMARY CARE | Facility: CLINIC | Age: 84
End: 2024-06-14
Payer: MEDICARE

## 2024-08-05 ENCOUNTER — APPOINTMENT (OUTPATIENT)
Dept: CARDIOLOGY | Facility: CLINIC | Age: 84
End: 2024-08-05
Payer: MEDICARE

## 2024-08-08 ENCOUNTER — OFFICE VISIT (OUTPATIENT)
Dept: CARDIOLOGY | Facility: CLINIC | Age: 84
End: 2024-08-08
Payer: MEDICARE

## 2024-08-08 VITALS
SYSTOLIC BLOOD PRESSURE: 120 MMHG | OXYGEN SATURATION: 97 % | HEART RATE: 84 BPM | BODY MASS INDEX: 23.01 KG/M2 | DIASTOLIC BLOOD PRESSURE: 70 MMHG | WEIGHT: 194 LBS

## 2024-08-08 DIAGNOSIS — I10 BENIGN ESSENTIAL HYPERTENSION: Primary | ICD-10-CM

## 2024-08-08 DIAGNOSIS — I48.20 CHRONIC ATRIAL FIBRILLATION (MULTI): ICD-10-CM

## 2024-08-08 PROCEDURE — 1125F AMNT PAIN NOTED PAIN PRSNT: CPT | Performed by: INTERNAL MEDICINE

## 2024-08-08 PROCEDURE — 3078F DIAST BP <80 MM HG: CPT | Performed by: INTERNAL MEDICINE

## 2024-08-08 PROCEDURE — 3074F SYST BP LT 130 MM HG: CPT | Performed by: INTERNAL MEDICINE

## 2024-08-08 PROCEDURE — 99213 OFFICE O/P EST LOW 20 MIN: CPT | Performed by: INTERNAL MEDICINE

## 2024-08-08 PROCEDURE — 1036F TOBACCO NON-USER: CPT | Performed by: INTERNAL MEDICINE

## 2024-08-08 PROCEDURE — 1157F ADVNC CARE PLAN IN RCRD: CPT | Performed by: INTERNAL MEDICINE

## 2024-08-08 PROCEDURE — 1159F MED LIST DOCD IN RCRD: CPT | Performed by: INTERNAL MEDICINE

## 2024-08-08 ASSESSMENT — ENCOUNTER SYMPTOMS
FEVER: 0
BACK PAIN: 1
EYES NEGATIVE: 1
CONSTITUTIONAL NEGATIVE: 1
ENDOCRINE NEGATIVE: 1
COUGH: 0
RESPIRATORY NEGATIVE: 1
NEUROLOGICAL NEGATIVE: 1
CHILLS: 0
FALLS: 0
GASTROINTESTINAL NEGATIVE: 1

## 2024-08-08 ASSESSMENT — PAIN SCALES - GENERAL: PAINLEVEL: 4

## 2024-08-08 NOTE — PROGRESS NOTES
Subjective      Chief Complaint   Patient presents with    Follow-up          He had the TKR and had trouble with recovery and had trouble afterwards with urinary retention.  He is walking as much as he can.  Is walking down the street and back.  He is having trouble with his back.  His breathing is doing well.  Is not getting dizyy or lightheaded and the BP is doing much better. His wt at home is down but is starting to improve and says is 10lbs under his normal wt.  Legs are not swelling and no PND or orthopnea.  He will have a right cataract removed later in the month.           Review of Systems   Constitutional: Negative. Negative for chills and fever.   HENT: Negative.     Eyes: Negative.    Respiratory: Negative.  Negative for cough.    Endocrine: Negative.    Skin: Negative.    Musculoskeletal:  Positive for back pain and joint pain. Negative for falls.   Gastrointestinal: Negative.    Genitourinary: Negative.    Neurological: Negative.    All other systems reviewed and are negative.       Past Surgical History:   Procedure Laterality Date    CATARACT EXTRACTION Left     KNEE ARTHROPLASTY Right     4/1/24    OTHER SURGICAL HISTORY Left     Hernia repair        Active Ambulatory Problems     Diagnosis Date Noted    Bilateral inguinal hernia without obstruction or gangrene 09/13/2023    BPH associated with nocturia 08/11/2021    DDD (degenerative disc disease), lumbar 09/13/2023    Degenerative joint disease 09/13/2023    Essential tremor 09/13/2023    History of TIA (transient ischemic attack) 09/13/2023    Hyperlipidemia, unspecified 06/20/2019    Onychomycosis 01/22/2019    Osteoarthritis of both knees 03/18/2021    Chronic atrial fibrillation (Multi) 09/13/2023    Pure hypercholesterolemia 09/13/2023    Stroke (Multi) 09/13/2023    Vitamin D deficiency 09/13/2023    Benign essential hypertension 04/04/2022    Benign prostatic hyperplasia without urinary obstruction 12/01/2023    Cataract 12/01/2023     Chronic arthritis 12/01/2023    Congestive heart failure (Multi) 12/01/2023    Disease of spinal cord (Multi) 05/31/2023    Disorder of prostate 12/01/2023    Dyslipidemia 12/01/2023    Eczema 12/01/2023    Impaired fasting glucose 06/23/2020    Impairment of balance 12/01/2023    Disorder of rotator cuff 12/01/2023    Knee pain 12/01/2023    Olecranon bursitis of left elbow 07/22/2020    Sensorineural hearing loss (SNHL) of both ears 12/01/2023    Unilateral primary osteoarthritis, right knee 01/09/2024    Bilateral impacted cerumen 02/06/2024    History of polymyalgia rheumatica 03/20/2024    Preop cardiovascular exam 03/20/2024    S/P total knee arthroplasty, right 04/01/2024     Resolved Ambulatory Problems     Diagnosis Date Noted    Polymyalgia rheumatica (Multi) 12/01/2023     Past Medical History:   Diagnosis Date    Adverse effect of anesthesia     CHF (congestive heart failure) (Multi)     IFG (impaired fasting glucose)     Nephrolithiasis     Osteoarthritis     Other conditions influencing health status     Spondylosis without myelopathy or radiculopathy, lumbosacral region     Unspecified abdominal hernia without obstruction or gangrene         Visit Vitals  /70   Pulse 84   Wt 88 kg (194 lb)   SpO2 97%   BMI 23.01 kg/m²   Smoking Status Never   BSA 2.19 m²        Objective     Constitutional:       Appearance: Healthy appearance.   Eyes:      Pupils: Pupils are equal, round, and reactive to light.   Neck:      Vascular: JVD normal.   Pulmonary:      Breath sounds: Normal breath sounds.   Cardiovascular:      PMI at left midclavicular line. Normal rate. Irregularly irregular rhythm. Normal S1. Normal S2.       Murmurs: There is no murmur.      No gallop.  No click. No rub.   Pulses:     Intact distal pulses.   Edema:     Peripheral edema absent.      Comments: varicose veins  Abdominal:      Palpations: Abdomen is soft.      Tenderness: There is no abdominal tenderness.   Musculoskeletal:       "Extremities: No clubbing present.Skin:     General: Skin is warm and dry.   Neurological:      General: No focal deficit present.            Lab Review:         Lab Results   Component Value Date    CHOL 148 12/01/2023    CHOL 158 09/06/2023    CHOL 143 02/16/2023     Lab Results   Component Value Date    HDL 57.0 12/01/2023    HDL 63 09/06/2023    HDL 45 02/16/2023     Lab Results   Component Value Date    LDLCALC 75 12/01/2023    LDLCALC 73 09/06/2023    LDLCALC 82 02/16/2023     Lab Results   Component Value Date    TRIG 80 12/01/2023    TRIG 108 09/06/2023    TRIG 79 02/16/2023     No components found for: \"CHOLHDL\"     Assessment/Plan     Benign essential hypertension  The BP is doing well    Chronic atrial fibrillation (Multi)  The afib is doing well and is on the eliquis for embolic protection.  He is to have cataract surgery and will hole the eliquis 3 days before     "

## 2024-08-19 ENCOUNTER — OFFICE VISIT (OUTPATIENT)
Dept: UROLOGY | Facility: CLINIC | Age: 84
End: 2024-08-19
Payer: MEDICARE

## 2024-08-19 VITALS — WEIGHT: 194 LBS | HEIGHT: 77 IN | BODY MASS INDEX: 22.91 KG/M2

## 2024-08-19 DIAGNOSIS — N40.1 BPH ASSOCIATED WITH NOCTURIA: ICD-10-CM

## 2024-08-19 DIAGNOSIS — R35.1 BPH ASSOCIATED WITH NOCTURIA: ICD-10-CM

## 2024-08-19 LAB
POC APPEARANCE, URINE: CLEAR
POC BILIRUBIN, URINE: NEGATIVE
POC BLOOD, URINE: NEGATIVE
POC COLOR, URINE: YELLOW
POC GLUCOSE, URINE: NEGATIVE MG/DL
POC KETONES, URINE: NEGATIVE MG/DL
POC LEUKOCYTES, URINE: NEGATIVE
POC NITRITE,URINE: NEGATIVE
POC PH, URINE: 6 PH
POC PROTEIN, URINE: ABNORMAL MG/DL
POC SPECIFIC GRAVITY, URINE: >=1.03
POC UROBILINOGEN, URINE: 0.2 EU/DL

## 2024-08-19 PROCEDURE — 1036F TOBACCO NON-USER: CPT | Performed by: UROLOGY

## 2024-08-19 PROCEDURE — 81003 URINALYSIS AUTO W/O SCOPE: CPT | Performed by: UROLOGY

## 2024-08-19 PROCEDURE — 1126F AMNT PAIN NOTED NONE PRSNT: CPT | Performed by: UROLOGY

## 2024-08-19 PROCEDURE — 1159F MED LIST DOCD IN RCRD: CPT | Performed by: UROLOGY

## 2024-08-19 PROCEDURE — 99213 OFFICE O/P EST LOW 20 MIN: CPT | Performed by: UROLOGY

## 2024-08-19 ASSESSMENT — PAIN SCALES - GENERAL: PAINLEVEL: 0-NO PAIN

## 2024-08-19 NOTE — PROGRESS NOTES
Patient is a 83 y.o. male presenting with BPH    SUBJECTIVE:  HPI   He has been voiding well.      Urine Culture (no units)   Date Value   05/03/2024 No growth        Past Medical History:   Diagnosis Date    Adverse effect of anesthesia     had lightheadedness, unable to void, hypotension    Benign essential hypertension 04/04/2022    Benign prostatic hyperplasia without urinary obstruction 12/01/2023    Bilateral inguinal hernia without obstruction or gangrene 09/13/2023    CHF (congestive heart failure) (Multi)     Chronic atrial fibrillation (Multi) 09/13/2023    Congestive heart failure (Multi) 12/01/2023    EF 55% 4/22 borderline dilation.    DDD (degenerative disc disease), lumbar     Degenerative joint disease 09/13/2023    Eczema     Essential tremor     History of polymyalgia rheumatica 03/20/2024    DX 6/23 tx 6 months, off Prednisone end of 12/23 inflammatory markers normal.    History of TIA (transient ischemic attack) 09/13/2023    IFG (impaired fasting glucose)     Nephrolithiasis     Onychomycosis     Osteoarthritis     Osteoarthritis of both knees 03/18/2021    Other conditions influencing health status     Nephrolithiasis    Polymyalgia rheumatica (Multi)     Spondylosis without myelopathy or radiculopathy, lumbosacral region     Unspecified abdominal hernia without obstruction or gangrene     Hernia      Past Surgical History:   Procedure Laterality Date    CATARACT EXTRACTION Left     KNEE ARTHROPLASTY Right     4/1/24    OTHER SURGICAL HISTORY Left     Hernia repair      Family History   Problem Relation Name Age of Onset    No Known Problems Mother      Colon cancer Father        Social History     Socioeconomic History    Marital status:    Tobacco Use    Smoking status: Never     Passive exposure: Never    Smokeless tobacco: Never   Vaping Use    Vaping status: Never Used   Substance and Sexual Activity    Alcohol use: Yes     Alcohol/week: 5.0 - 6.0 standard drinks of alcohol      Types: 2 - 3 Glasses of wine, 3 Standard drinks or equivalent per week     Comment: wine    Drug use: Never    Sexual activity: Defer     Social Determinants of Health     Financial Resource Strain: Low Risk  (4/1/2024)    Overall Financial Resource Strain (CARDIA)     Difficulty of Paying Living Expenses: Not very hard   Food Insecurity: No Food Insecurity (4/2/2024)    Hunger Vital Sign     Worried About Running Out of Food in the Last Year: Never true     Ran Out of Food in the Last Year: Never true   Transportation Needs: No Transportation Needs (4/1/2024)    PRAPARE - Transportation     Lack of Transportation (Medical): No     Lack of Transportation (Non-Medical): No   Housing Stability: Low Risk  (4/1/2024)    Housing Stability Vital Sign     Unable to Pay for Housing in the Last Year: No     Number of Places Lived in the Last Year: 1     Unstable Housing in the Last Year: No        Review of Systems   Constitutional: denies any unintentional weight loss or change in strength.  Integumentary: denies any rashes or pruritus.  Eyes: denies any double vision or eye pain.  Ear/Nose/Mouth/Throat: denies any nosebleeds or gum bleeds.  Cardiovascular: denies any chest pain or syncope.  Respiratory: denies hemoptysis.  Gastrointestinal: denies nausea or vomiting.  Musculoskeletal: denies muscle cramping or weakness.  Neurologic: denies convulsions or seizures.  Hematologic/Lymphatic: denies bleeding tendencies.  Endocrine: denies heat/cold intolerance.  All other systems have been reviewed and are negative unless otherwise noted in the HPI.    OBJECTIVE:  There were no vitals taken for this visit.  Physical Exam   Constitutional: No obvious distress.  Eyes: Non-injected conjunctiva, sclera clear, EOMI.  Ears/Nose/Mouth/Throat: No obvious drainage per ears or nose.  Cardiovascular: Extremities are warm and well perfused. No edema, cyanosis or pallor.  Respiratory: No audible wheezing/stridor; respirations do not appear  "labored.  Gastrointestinal: Abdomen soft, not distended.  Musculoskeletal: Normal ROM of extremities.  Skin: No obvious rashes or open sores.  Neurologic: Alert and oriented, CN 2-12 grossly intact.  Psychiatric: Answers questions appropriately with normal affect.  Hematologic/Lymphatic/Immunologic: No obvious bruises or sites of spontaneous bleeding.  Genitourinary: No CVA tenderness, bladder not palpable.     Labs:  Lab Results   Component Value Date    WBC 15.1 (H) 04/03/2024    HGB 12.7 (L) 04/03/2024    HCT 38.4 (L) 04/03/2024     04/03/2024    CHOL 148 12/01/2023    TRIG 80 12/01/2023    HDL 57.0 12/01/2023    ALT 13 12/01/2023    AST 16 12/01/2023     04/03/2024    K 4.2 04/03/2024     04/03/2024    CREATININE 0.90 04/03/2024    BUN 13 04/03/2024    CO2 24 04/03/2024    TSH 2.04 05/30/2023    INR 1.2 (H) 04/04/2022    HGBA1C 5.6 12/01/2023     No results found for: \"KPSAT\", \"KPSAP\"  IMAGING:        PROCEDURES:    ASSESSMENT/PLAN:  Problem List Items Addressed This Visit       BPH associated with nocturia    Relevant Orders    POCT UA Automated manually resulted (Completed)      He has a history of BPH.  He was treated with laser vaporization of the prostate in May 2024.   He has a history of urinary retention.  He will follow up in 4 months      All questions were answered to the patient’s satisfaction.  Patient agrees with the plan and wishes to proceed.  Follow-up will be scheduled appropriately.     Serafin Payan MD    "

## 2024-08-22 ASSESSMENT — ENCOUNTER SYMPTOMS
COUGH: 0
CHILLS: 0
SHORTNESS OF BREATH: 0
FEVER: 0
APPETITE CHANGE: 0
HEADACHES: 0
DIARRHEA: 0
NAUSEA: 0
ABDOMINAL PAIN: 0
VOMITING: 0

## 2024-08-22 NOTE — PROGRESS NOTES
[facility]: [facility]  MEDICARE WELLNESS EXAM      Qamar Mckinney is a 83 y.o. male that is presenting today for Establish Care.    Assessment/Plan    Diagnoses and all orders for this visit:  Annual physical exam  -     Referral to Pain Medicine; Future  -     ipratropium (Atrovent) 42 mcg (0.06 %) nasal spray; Administer 2 sprays into each nostril 4 times a day.  Chronic atrial fibrillation (Multi)  Benign essential hypertension  Impaired fasting glucose  Pure hypercholesterolemia  -     CBC and Auto Differential; Future  -     Comprehensive Metabolic Panel; Future  -     Lipid Panel; Future  -     TSH with reflex to Free T4 if abnormal; Future  Primary osteoarthritis involving multiple joints  Chronic systolic congestive heart failure (Multi)  BPH associated with nocturia  History of TIA (transient ischemic attack)  Benign prostatic hyperplasia without urinary obstruction  History of polymyalgia rheumatica  Exudative age-related macular degeneration, left eye, with active choroidal neovascularization (Multi)  Essential tremor  Primary osteoarthritis of both knees  Damage to supporting structure of tooth  Mixed hyperlipidemia  -     Lipid Panel; Future  Hyperglycemia  -     Hemoglobin A1C; Future  Vitamin D deficiency  -     Vitamin D 25-Hydroxy,Total (for eval of Vitamin D levels); Future  Other orders  -     Follow Up In Primary Care - Established; Future    Afib rate controlled and anticoagulated, sees cardio.    In terms of gait feels he likely has some degree of spinal degenerative disease or spinal stenosis resulting in forward flexed posture which is affecting gait.  Also has mild tremor bilat hands but no other overt Parkinsonian features.  Could benefit from core muscle strengthening and brings up a referral regarding his back but would opt for pain mgmt rather than spinal surgeon at this point.    ADVANCED CARE PLANNING  Advanced Care Planning was discussed with patient:  The  patient has an active advanced care plan on file. The patient has an active surrogate decision-maker on file.  Encouraged the patient to confirm that Living Will and Healthcare Power of  (HCPoA) are accurate and up to date.  Encouraged the patient to confirm that our office be provided a copy of any documentation in the event that anything changes.    ACTIVITIES OF DAILY LIVING  Basic ADLs:  Bathing: Independent, Dressing: Independent, Toileting: Independent, Transferring: Independent, Continence: Independent, Feeding: Independent.    Instrumental ADLs:  Ability to use phone: Independent, Shopping: Independent, Cooking: Independent, House-keeping: Independent, Laundry: Independent, Transportation: Independent, Medication Management: Independent, Finance Management: Independent.    Subjective   HPI  This patient presents today for annual physical, Medicare wellness exam.  Discussed screening/prevention, healthy lifestyle and code status.   Reviewed the patient's wishes regarding decision making.    Consultant visits and notes reviewed: Cardio (Leonel); Urology (Schuyler); ENT (Bubba)    The patient denies chest pain and shortness of breath.  No exertion-provoked or anginal-type symptoms are reported.    Stable from a functional standpoint in regard to ADLs and IADLs.  No recent falls are reported.    Had a lot of trouble after TKA w/ prostate and urinary retention.  Better after laser therapy - sees Dr. Payan.    Gait is not as stable as it used to be.    Review of Systems   Constitutional:  Negative for appetite change, chills and fever.   Respiratory:  Negative for cough and shortness of breath.    Cardiovascular:  Negative for chest pain.   Gastrointestinal:  Negative for abdominal pain, diarrhea, nausea and vomiting.   Neurological:  Negative for headaches.   All other systems reviewed and are negative.    Objective   Vitals:    08/26/24 1103   BP: 118/68   Pulse: 66   SpO2: 98%      Body mass index is  "23.36 kg/m².  Physical Exam  Vitals reviewed.   Constitutional:       General: He is not in acute distress.     Appearance: He is not toxic-appearing.   HENT:      Head: Normocephalic and atraumatic.      Mouth/Throat:      Mouth: Mucous membranes are moist.   Eyes:      Pupils: Pupils are equal, round, and reactive to light.   Cardiovascular:      Rate and Rhythm: Normal rate and regular rhythm.      Heart sounds: No murmur heard.  Pulmonary:      Breath sounds: Normal breath sounds. No wheezing, rhonchi or rales.   Abdominal:      General: There is no distension.      Palpations: Abdomen is soft.   Musculoskeletal:      Right lower leg: No edema.      Left lower leg: No edema.   Neurological:      General: No focal deficit present.      Mental Status: He is alert and oriented to person, place, and time.     Gait - narrow base, leaning forward. No device.    Diagnostic Results   Lab Results   Component Value Date    GLUCOSE 122 (H) 04/03/2024    CALCIUM 8.9 04/03/2024     04/03/2024    K 4.2 04/03/2024    CO2 24 04/03/2024     04/03/2024    BUN 13 04/03/2024    CREATININE 0.90 04/03/2024     Lab Results   Component Value Date    ALT 13 12/01/2023    AST 16 12/01/2023    ALKPHOS 82 12/01/2023    BILITOT 1.0 12/01/2023     Lab Results   Component Value Date    WBC 15.1 (H) 04/03/2024    HGB 12.7 (L) 04/03/2024    HCT 38.4 (L) 04/03/2024    MCV 91 04/03/2024     04/03/2024     Lab Results   Component Value Date    CHOL 148 12/01/2023    CHOL 158 09/06/2023    CHOL 143 02/16/2023     Lab Results   Component Value Date    HDL 57.0 12/01/2023    HDL 63 09/06/2023    HDL 45 02/16/2023     Lab Results   Component Value Date    LDLCALC 75 12/01/2023    LDLCALC 73 09/06/2023    LDLCALC 82 02/16/2023     Lab Results   Component Value Date    TRIG 80 12/01/2023    TRIG 108 09/06/2023    TRIG 79 02/16/2023     No components found for: \"CHOLHDL\"  Lab Results   Component Value Date    HGBA1C 5.6 12/01/2023 "     Other labs not included in the list above reviewed either before or during this encounter.    History   Past Medical History:   Diagnosis Date    Adverse effect of anesthesia     had lightheadedness, unable to void, hypotension    Benign essential hypertension 04/04/2022    Benign prostatic hyperplasia without urinary obstruction 12/01/2023    Bilateral inguinal hernia without obstruction or gangrene 09/13/2023    CHF (congestive heart failure) (Multi)     Chronic atrial fibrillation (Multi) 09/13/2023    Congestive heart failure (Multi) 12/01/2023    EF 55% 4/22 borderline dilation.    DDD (degenerative disc disease), lumbar     Degenerative joint disease 09/13/2023    Eczema     Essential tremor     History of polymyalgia rheumatica 03/20/2024    DX 6/23 tx 6 months, off Prednisone end of 12/23 inflammatory markers normal.    History of TIA (transient ischemic attack) 09/13/2023    IFG (impaired fasting glucose)     Nephrolithiasis     Onychomycosis     Osteoarthritis     Osteoarthritis of both knees 03/18/2021    Other conditions influencing health status     Nephrolithiasis    Polymyalgia rheumatica (Multi)     Spondylosis without myelopathy or radiculopathy, lumbosacral region     Unspecified abdominal hernia without obstruction or gangrene     Hernia     Past Surgical History:   Procedure Laterality Date    CATARACT EXTRACTION Left     KNEE ARTHROPLASTY Right     4/1/24    OTHER SURGICAL HISTORY Left     Hernia repair     Family History   Problem Relation Name Age of Onset    No Known Problems Mother      Colon cancer Father       Social History     Socioeconomic History    Marital status:      Spouse name: Not on file    Number of children: Not on file    Years of education: Not on file    Highest education level: Not on file   Occupational History    Not on file   Tobacco Use    Smoking status: Never     Passive exposure: Never    Smokeless tobacco: Never   Vaping Use    Vaping status: Never Used    Substance and Sexual Activity    Alcohol use: Yes     Alcohol/week: 5.0 - 6.0 standard drinks of alcohol     Types: 2 - 3 Glasses of wine, 3 Standard drinks or equivalent per week     Comment: wine    Drug use: Never    Sexual activity: Defer   Other Topics Concern    Not on file   Social History Narrative    Not on file     Social Determinants of Health     Financial Resource Strain: Low Risk  (4/1/2024)    Overall Financial Resource Strain (CARDIA)     Difficulty of Paying Living Expenses: Not very hard   Food Insecurity: No Food Insecurity (4/2/2024)    Hunger Vital Sign     Worried About Running Out of Food in the Last Year: Never true     Ran Out of Food in the Last Year: Never true   Transportation Needs: No Transportation Needs (4/1/2024)    PRAPARE - Transportation     Lack of Transportation (Medical): No     Lack of Transportation (Non-Medical): No   Physical Activity: Not on file   Stress: Not on file   Social Connections: Not on file   Intimate Partner Violence: Not on file   Housing Stability: Low Risk  (4/1/2024)    Housing Stability Vital Sign     Unable to Pay for Housing in the Last Year: No     Number of Places Lived in the Last Year: 1     Unstable Housing in the Last Year: No     Allergies   Allergen Reactions    Furosemide Other     Lightheadedness, BP Drop     Current Outpatient Medications on File Prior to Visit   Medication Sig Dispense Refill    apixaban (Eliquis) 5 mg tablet Take 1 tablet (5 mg) by mouth 2 times a day. AS DIRECTED 180 tablet 3    atorvastatin (Lipitor) 10 mg tablet Take 1 tablet (10 mg) by mouth once daily. 90 tablet 3    cholecalciferol (Vitamin D-3) 25 MCG (1000 UT) capsule Take 1 capsule (25 mcg) by mouth once daily. TAKE AS DIRECTED      metoprolol succinate XL (Toprol-XL) 25 mg 24 hr tablet Take 1 tablet (25 mg) by mouth once daily. Do not crush or chew. 90 tablet 3    polyethylene glycol (Glycolax, Miralax) 17 gram packet Take 17 g by mouth once daily as needed  (constipation).      vitamins A,C,E-zinc-copper (ICaps AREDS) 4,296 mcg-226 mg-90 mg capsule Take 1 Capful by mouth 2 times a day. TAKE AS DIRECTED      acetaminophen (Tylenol) 500 mg tablet Take 1 tablet (500 mg) by mouth every 6 hours if needed for mild pain (1 - 3).       No current facility-administered medications on file prior to visit.     Immunization History   Administered Date(s) Administered    Flu vaccine, quadrivalent, high-dose, preservative free, age 65y+ (FLUZONE) 10/26/2020, 11/20/2023    Flu vaccine, trivalent, preservative free, HIGH-DOSE, age 65y+ (Fluzone) 10/24/2018    Influenza, Seasonal, Quadrivalent, Adjuvanted 10/25/2021    Influenza, Unspecified 09/28/2009, 10/24/2018    Influenza, injectable, quadrivalent 09/26/2019    Influenza, seasonal, injectable 09/08/2015, 09/21/2015    Moderna COVID-19 vaccine, bivalent, blue cap/gray label *Check age/dose* 09/28/2022    Pfizer COVID-19 vaccine, Fall 2023, 12 years and older, (30mcg/0.3mL) 12/30/2023    Pfizer Purple Cap SARS-CoV-2 01/28/2021, 02/25/2021, 10/04/2021    Pneumococcal polysaccharide vaccine, 23-valent, age 2 years and older (PNEUMOVAX 23) 09/02/2013, 08/08/2014, 06/19/2018    RESPIRATORY SYNCYTIAL VIRUS (RSV), ELIGIBLE PREGNANT PTS, 0.5 ML (ABRYSVO) 10/24/2023    Zoster vaccine, recombinant, adult (SHINGRIX) 12/11/2018, 02/18/2019    Zoster, live 05/13/2022     Patient's medical history was reviewed and updated either before or during this encounter.     Gregorio Olea MD

## 2024-08-26 ENCOUNTER — OFFICE VISIT (OUTPATIENT)
Dept: PRIMARY CARE | Facility: CLINIC | Age: 84
End: 2024-08-26
Payer: MEDICARE

## 2024-08-26 VITALS
OXYGEN SATURATION: 98 % | HEART RATE: 66 BPM | DIASTOLIC BLOOD PRESSURE: 68 MMHG | BODY MASS INDEX: 23.36 KG/M2 | WEIGHT: 197 LBS | SYSTOLIC BLOOD PRESSURE: 118 MMHG

## 2024-08-26 DIAGNOSIS — I50.22 CHRONIC SYSTOLIC CONGESTIVE HEART FAILURE (MULTI): ICD-10-CM

## 2024-08-26 DIAGNOSIS — I10 BENIGN ESSENTIAL HYPERTENSION: ICD-10-CM

## 2024-08-26 DIAGNOSIS — E78.00 PURE HYPERCHOLESTEROLEMIA: ICD-10-CM

## 2024-08-26 DIAGNOSIS — K08.9: ICD-10-CM

## 2024-08-26 DIAGNOSIS — M17.0 PRIMARY OSTEOARTHRITIS OF BOTH KNEES: ICD-10-CM

## 2024-08-26 DIAGNOSIS — H35.3221 EXUDATIVE AGE-RELATED MACULAR DEGENERATION, LEFT EYE, WITH ACTIVE CHOROIDAL NEOVASCULARIZATION (MULTI): ICD-10-CM

## 2024-08-26 DIAGNOSIS — Z86.73 HISTORY OF TIA (TRANSIENT ISCHEMIC ATTACK): ICD-10-CM

## 2024-08-26 DIAGNOSIS — E78.2 MIXED HYPERLIPIDEMIA: ICD-10-CM

## 2024-08-26 DIAGNOSIS — R35.1 BPH ASSOCIATED WITH NOCTURIA: ICD-10-CM

## 2024-08-26 DIAGNOSIS — R73.01 IMPAIRED FASTING GLUCOSE: ICD-10-CM

## 2024-08-26 DIAGNOSIS — I48.20 CHRONIC ATRIAL FIBRILLATION (MULTI): ICD-10-CM

## 2024-08-26 DIAGNOSIS — R73.9 HYPERGLYCEMIA: ICD-10-CM

## 2024-08-26 DIAGNOSIS — M15.9 PRIMARY OSTEOARTHRITIS INVOLVING MULTIPLE JOINTS: ICD-10-CM

## 2024-08-26 DIAGNOSIS — N40.1 BPH ASSOCIATED WITH NOCTURIA: ICD-10-CM

## 2024-08-26 DIAGNOSIS — E55.9 VITAMIN D DEFICIENCY: ICD-10-CM

## 2024-08-26 DIAGNOSIS — N40.0 BENIGN PROSTATIC HYPERPLASIA WITHOUT URINARY OBSTRUCTION: ICD-10-CM

## 2024-08-26 DIAGNOSIS — G25.0 ESSENTIAL TREMOR: ICD-10-CM

## 2024-08-26 DIAGNOSIS — Z00.00 ANNUAL PHYSICAL EXAM: Primary | ICD-10-CM

## 2024-08-26 DIAGNOSIS — Z87.39 HISTORY OF POLYMYALGIA RHEUMATICA: ICD-10-CM

## 2024-08-26 PROBLEM — G95.9 DISEASE OF SPINAL CORD (MULTI): Status: RESOLVED | Noted: 2023-05-31 | Resolved: 2024-08-26

## 2024-08-26 PROCEDURE — 1159F MED LIST DOCD IN RCRD: CPT | Performed by: INTERNAL MEDICINE

## 2024-08-26 PROCEDURE — 3074F SYST BP LT 130 MM HG: CPT | Performed by: INTERNAL MEDICINE

## 2024-08-26 PROCEDURE — 1036F TOBACCO NON-USER: CPT | Performed by: INTERNAL MEDICINE

## 2024-08-26 PROCEDURE — 3078F DIAST BP <80 MM HG: CPT | Performed by: INTERNAL MEDICINE

## 2024-08-26 PROCEDURE — G0439 PPPS, SUBSEQ VISIT: HCPCS | Performed by: INTERNAL MEDICINE

## 2024-08-26 PROCEDURE — 99215 OFFICE O/P EST HI 40 MIN: CPT | Performed by: INTERNAL MEDICINE

## 2024-08-26 PROCEDURE — 1125F AMNT PAIN NOTED PAIN PRSNT: CPT | Performed by: INTERNAL MEDICINE

## 2024-08-26 RX ORDER — ACETAMINOPHEN 500 MG
500 TABLET ORAL EVERY 6 HOURS PRN
COMMUNITY

## 2024-08-26 RX ORDER — IPRATROPIUM BROMIDE 42 UG/1
2 SPRAY, METERED NASAL 4 TIMES DAILY
Qty: 15 ML | Refills: 1 | Status: SHIPPED | OUTPATIENT
Start: 2024-08-26 | End: 2025-08-26

## 2024-08-26 ASSESSMENT — PATIENT HEALTH QUESTIONNAIRE - PHQ9
SUM OF ALL RESPONSES TO PHQ9 QUESTIONS 1 AND 2: 0
2. FEELING DOWN, DEPRESSED OR HOPELESS: NOT AT ALL
1. LITTLE INTEREST OR PLEASURE IN DOING THINGS: NOT AT ALL

## 2024-08-26 ASSESSMENT — PAIN SCALES - GENERAL: PAINLEVEL: 3

## 2024-08-26 ASSESSMENT — ENCOUNTER SYMPTOMS
LOSS OF SENSATION IN FEET: 0
OCCASIONAL FEELINGS OF UNSTEADINESS: 0
DEPRESSION: 0

## 2024-09-03 ENCOUNTER — LAB (OUTPATIENT)
Dept: LAB | Facility: LAB | Age: 84
End: 2024-09-03
Payer: MEDICARE

## 2024-09-03 DIAGNOSIS — I10 BENIGN ESSENTIAL HYPERTENSION: ICD-10-CM

## 2024-09-03 DIAGNOSIS — R73.01 IMPAIRED FASTING GLUCOSE: ICD-10-CM

## 2024-09-03 DIAGNOSIS — E55.9 VITAMIN D DEFICIENCY: ICD-10-CM

## 2024-09-03 DIAGNOSIS — R73.9 HYPERGLYCEMIA: ICD-10-CM

## 2024-09-03 DIAGNOSIS — E78.2 MIXED HYPERLIPIDEMIA: ICD-10-CM

## 2024-09-03 DIAGNOSIS — Z87.39 HISTORY OF POLYMYALGIA RHEUMATICA: ICD-10-CM

## 2024-09-03 DIAGNOSIS — E78.00 PURE HYPERCHOLESTEROLEMIA: ICD-10-CM

## 2024-09-03 LAB
25(OH)D3 SERPL-MCNC: 48 NG/ML (ref 31–100)
ALBUMIN SERPL-MCNC: 4 G/DL (ref 3.5–5)
ALP BLD-CCNC: 104 U/L (ref 35–125)
ALT SERPL-CCNC: 8 U/L (ref 5–40)
ANION GAP SERPL CALC-SCNC: 11 MMOL/L
AST SERPL-CCNC: 15 U/L (ref 5–40)
BASOPHILS # BLD AUTO: 0.03 X10*3/UL (ref 0–0.1)
BASOPHILS NFR BLD AUTO: 0.4 %
BILIRUB SERPL-MCNC: 0.8 MG/DL (ref 0.1–1.2)
BUN SERPL-MCNC: 19 MG/DL (ref 8–25)
CALCIUM SERPL-MCNC: 9.2 MG/DL (ref 8.5–10.4)
CHLORIDE SERPL-SCNC: 105 MMOL/L (ref 97–107)
CHOLEST SERPL-MCNC: 143 MG/DL (ref 133–200)
CHOLEST/HDLC SERPL: 2.8 {RATIO}
CO2 SERPL-SCNC: 25 MMOL/L (ref 24–31)
CREAT SERPL-MCNC: 1.1 MG/DL (ref 0.4–1.6)
CRP SERPL-MCNC: 0.3 MG/DL (ref 0–2)
EGFRCR SERPLBLD CKD-EPI 2021: 67 ML/MIN/1.73M*2
EOSINOPHIL # BLD AUTO: 0.2 X10*3/UL (ref 0–0.4)
EOSINOPHIL NFR BLD AUTO: 2.7 %
ERYTHROCYTE [DISTWIDTH] IN BLOOD BY AUTOMATED COUNT: 13.2 % (ref 11.5–14.5)
ERYTHROCYTE [SEDIMENTATION RATE] IN BLOOD BY WESTERGREN METHOD: 23 MM/H (ref 0–20)
EST. AVERAGE GLUCOSE BLD GHB EST-MCNC: 105 MG/DL
GLUCOSE SERPL-MCNC: 101 MG/DL (ref 65–99)
HBA1C MFR BLD: 5.3 %
HCT VFR BLD AUTO: 42.2 % (ref 41–52)
HDLC SERPL-MCNC: 51 MG/DL
HGB BLD-MCNC: 13.4 G/DL (ref 13.5–17.5)
IMM GRANULOCYTES # BLD AUTO: 0.01 X10*3/UL (ref 0–0.5)
IMM GRANULOCYTES NFR BLD AUTO: 0.1 % (ref 0–0.9)
LDLC SERPL CALC-MCNC: 75 MG/DL (ref 65–130)
LYMPHOCYTES # BLD AUTO: 2.3 X10*3/UL (ref 0.8–3)
LYMPHOCYTES NFR BLD AUTO: 31.5 %
MCH RBC QN AUTO: 29.7 PG (ref 26–34)
MCHC RBC AUTO-ENTMCNC: 31.8 G/DL (ref 32–36)
MCV RBC AUTO: 94 FL (ref 80–100)
MONOCYTES # BLD AUTO: 0.73 X10*3/UL (ref 0.05–0.8)
MONOCYTES NFR BLD AUTO: 10 %
NEUTROPHILS # BLD AUTO: 4.04 X10*3/UL (ref 1.6–5.5)
NEUTROPHILS NFR BLD AUTO: 55.3 %
NRBC BLD-RTO: 0 /100 WBCS (ref 0–0)
PLATELET # BLD AUTO: 268 X10*3/UL (ref 150–450)
POTASSIUM SERPL-SCNC: 4.1 MMOL/L (ref 3.4–5.1)
PROT SERPL-MCNC: 6.8 G/DL (ref 5.9–7.9)
RBC # BLD AUTO: 4.51 X10*6/UL (ref 4.5–5.9)
SODIUM SERPL-SCNC: 141 MMOL/L (ref 133–145)
TRIGL SERPL-MCNC: 83 MG/DL (ref 40–150)
TSH SERPL DL<=0.05 MIU/L-ACNC: 3.68 MIU/L (ref 0.27–4.2)
WBC # BLD AUTO: 7.3 X10*3/UL (ref 4.4–11.3)

## 2024-09-03 PROCEDURE — 36415 COLL VENOUS BLD VENIPUNCTURE: CPT

## 2024-09-03 PROCEDURE — 80061 LIPID PANEL: CPT

## 2024-09-03 PROCEDURE — 85025 COMPLETE CBC W/AUTO DIFF WBC: CPT

## 2024-09-03 PROCEDURE — 82306 VITAMIN D 25 HYDROXY: CPT

## 2024-09-03 PROCEDURE — 80053 COMPREHEN METABOLIC PANEL: CPT

## 2024-09-03 PROCEDURE — 85652 RBC SED RATE AUTOMATED: CPT

## 2024-09-03 PROCEDURE — 86140 C-REACTIVE PROTEIN: CPT

## 2024-09-03 PROCEDURE — 83036 HEMOGLOBIN GLYCOSYLATED A1C: CPT

## 2024-09-03 PROCEDURE — 84443 ASSAY THYROID STIM HORMONE: CPT

## 2025-02-05 NOTE — PROGRESS NOTES
Subjective      Chief Complaint   Patient presents with    Follow-up     Mr. Mckinney is present for his 6 month Follow up with Dr. Castaneda            He does have a history of hypertension and chronic atrial fibrillation.  He does have a history of having a TIA years ago.  In 2024 he had a total knee replacement had trouble with urinary retention and had greenlight laser treatment  He is doing alright and the back is a problem and the knee is doing alright.    He does not feel the heart is racing and does not notice the afib.  The swelling in the legs are much imporved  The BP is a little low           Review of Systems   Constitutional: Negative. Negative for chills and fever.   HENT: Negative.     Eyes: Negative.    Respiratory: Negative.  Negative for shortness of breath.    Endocrine: Negative.    Skin: Negative.    Musculoskeletal: Negative.  Negative for falls.   Gastrointestinal: Negative.    Genitourinary: Negative.    Neurological:  Positive for loss of balance.   All other systems reviewed and are negative.       Past Surgical History:   Procedure Laterality Date    CATARACT EXTRACTION Left     KNEE ARTHROPLASTY Right     4/1/24    OTHER SURGICAL HISTORY Left     Hernia repair        Active Ambulatory Problems     Diagnosis Date Noted    Bilateral inguinal hernia without obstruction or gangrene 09/13/2023    BPH associated with nocturia 08/11/2021    DDD (degenerative disc disease), lumbar 09/13/2023    Degenerative joint disease 09/13/2023    Essential tremor 09/13/2023    History of TIA (transient ischemic attack) 09/13/2023    Hyperlipidemia, unspecified 06/20/2019    Onychomycosis 01/22/2019    Osteoarthritis of both knees 03/18/2021    Chronic atrial fibrillation (Multi) 09/13/2023    Pure hypercholesterolemia 09/13/2023    Stroke (Multi) 09/13/2023    Vitamin D deficiency 09/13/2023    Benign essential hypertension 04/04/2022    Benign prostatic hyperplasia without urinary obstruction 12/01/2023     "Cataract 12/01/2023    Chronic arthritis 12/01/2023    Congestive heart failure 12/01/2023    Disorder of prostate 12/01/2023    Dyslipidemia 12/01/2023    Eczema 12/01/2023    Impaired fasting glucose 06/23/2020    Impairment of balance 12/01/2023    Disorder of rotator cuff 12/01/2023    Knee pain 12/01/2023    Olecranon bursitis of left elbow 07/22/2020    Sensorineural hearing loss (SNHL) of both ears 12/01/2023    Unilateral primary osteoarthritis, right knee 01/09/2024    Bilateral impacted cerumen 02/06/2024    History of polymyalgia rheumatica 03/20/2024    Preop cardiovascular exam 03/20/2024    S/P total knee arthroplasty, right 04/01/2024     Resolved Ambulatory Problems     Diagnosis Date Noted    Disease of spinal cord (Multi) 05/31/2023    Polymyalgia rheumatica (Multi) 12/01/2023     Past Medical History:   Diagnosis Date    Adverse effect of anesthesia     CHF (congestive heart failure)     IFG (impaired fasting glucose)     Nephrolithiasis     Osteoarthritis     Other conditions influencing health status     Spondylosis without myelopathy or radiculopathy, lumbosacral region     Unspecified abdominal hernia without obstruction or gangrene         Visit Vitals  BP 94/60 (BP Location: Right arm, Patient Position: Sitting, BP Cuff Size: Adult)   Pulse 76   Temp 37 °C (98.6 °F) (Core)   Resp 14   Ht 1.956 m (6' 5\")   Wt 90.7 kg (200 lb)   SpO2 98%   BMI 23.72 kg/m²   Smoking Status Never   BSA 2.22 m²        Objective     Constitutional:       Appearance: Healthy appearance.   Eyes:      Pupils: Pupils are equal, round, and reactive to light.   Neck:      Vascular: No JVR. JVD normal.   Pulmonary:      Effort: Pulmonary effort is normal.      Breath sounds: Normal breath sounds.   Cardiovascular:      PMI at left midclavicular line. Normal rate. Irregularly irregular rhythm. Normal S1. Normal S2.       Murmurs: There is no murmur.      No gallop.  No click. No rub.   Pulses:     Intact distal pulses. " "  Edema:     Peripheral edema absent.   Abdominal:      Palpations: Abdomen is soft.      Tenderness: There is no abdominal tenderness.   Musculoskeletal: Normal range of motion.      Extremities: No clubbing present.Skin:     General: Skin is warm and dry.   Neurological:      General: No focal deficit present.            Lab Review:         Lab Results   Component Value Date    CHOL 143 09/03/2024    CHOL 148 12/01/2023    CHOL 158 09/06/2023     Lab Results   Component Value Date    HDL 51.0 09/03/2024    HDL 57.0 12/01/2023    HDL 63 09/06/2023     Lab Results   Component Value Date    LDLCALC 75 09/03/2024    LDLCALC 75 12/01/2023    LDLCALC 73 09/06/2023     Lab Results   Component Value Date    TRIG 83 09/03/2024    TRIG 80 12/01/2023    TRIG 108 09/06/2023     No components found for: \"CHOLHDL\"     Assessment/Plan     Chronic atrial fibrillation (Multi)  Is doing well and the afib is controlled. And is on the eliquis    Hyperlipidemia, unspecified  Is doing well    Benign essential hypertension  The BP is low today and is feeling well.     "

## 2025-02-06 ENCOUNTER — APPOINTMENT (OUTPATIENT)
Age: 85
End: 2025-02-06
Payer: MEDICARE

## 2025-02-06 VITALS
HEIGHT: 77 IN | DIASTOLIC BLOOD PRESSURE: 60 MMHG | HEART RATE: 76 BPM | SYSTOLIC BLOOD PRESSURE: 94 MMHG | WEIGHT: 200 LBS | BODY MASS INDEX: 23.62 KG/M2 | TEMPERATURE: 98.6 F | RESPIRATION RATE: 14 BRPM | OXYGEN SATURATION: 98 %

## 2025-02-06 DIAGNOSIS — I48.21 PERMANENT ATRIAL FIBRILLATION (MULTI): ICD-10-CM

## 2025-02-06 DIAGNOSIS — I48.20 CHRONIC ATRIAL FIBRILLATION (MULTI): Primary | ICD-10-CM

## 2025-02-06 DIAGNOSIS — I10 BENIGN ESSENTIAL HYPERTENSION: ICD-10-CM

## 2025-02-06 DIAGNOSIS — E78.00 PURE HYPERCHOLESTEROLEMIA: ICD-10-CM

## 2025-02-06 DIAGNOSIS — E78.5 HYPERLIPIDEMIA, UNSPECIFIED HYPERLIPIDEMIA TYPE: ICD-10-CM

## 2025-02-06 PROCEDURE — 1159F MED LIST DOCD IN RCRD: CPT | Performed by: INTERNAL MEDICINE

## 2025-02-06 PROCEDURE — 3078F DIAST BP <80 MM HG: CPT | Performed by: INTERNAL MEDICINE

## 2025-02-06 PROCEDURE — 99213 OFFICE O/P EST LOW 20 MIN: CPT | Performed by: INTERNAL MEDICINE

## 2025-02-06 PROCEDURE — 1126F AMNT PAIN NOTED NONE PRSNT: CPT | Performed by: INTERNAL MEDICINE

## 2025-02-06 PROCEDURE — 1036F TOBACCO NON-USER: CPT | Performed by: INTERNAL MEDICINE

## 2025-02-06 PROCEDURE — 3074F SYST BP LT 130 MM HG: CPT | Performed by: INTERNAL MEDICINE

## 2025-02-06 RX ORDER — ATORVASTATIN CALCIUM 10 MG/1
10 TABLET, FILM COATED ORAL DAILY
Qty: 90 TABLET | Refills: 3 | Status: SHIPPED | OUTPATIENT
Start: 2025-02-06 | End: 2026-02-06

## 2025-02-06 RX ORDER — METOPROLOL SUCCINATE 25 MG/1
25 TABLET, EXTENDED RELEASE ORAL DAILY
Qty: 90 TABLET | Refills: 3 | Status: SHIPPED | OUTPATIENT
Start: 2025-02-06 | End: 2026-02-06

## 2025-02-06 ASSESSMENT — ENCOUNTER SYMPTOMS
ENDOCRINE NEGATIVE: 1
FALLS: 0
GASTROINTESTINAL NEGATIVE: 1
FEVER: 0
OCCASIONAL FEELINGS OF UNSTEADINESS: 0
DEPRESSION: 0
LOSS OF SENSATION IN FEET: 0
LOSS OF BALANCE: 1
SHORTNESS OF BREATH: 0
RESPIRATORY NEGATIVE: 1
MUSCULOSKELETAL NEGATIVE: 1
CONSTITUTIONAL NEGATIVE: 1
EYES NEGATIVE: 1
CHILLS: 0

## 2025-02-06 ASSESSMENT — LIFESTYLE VARIABLES
AUDIT TOTAL SCORE: 2
AUDIT-C TOTAL SCORE: 2
AUDIT-C TOTAL SCORE: 4
HAVE YOU OR SOMEONE ELSE BEEN INJURED AS A RESULT OF YOUR DRINKING: NO
HOW MANY STANDARD DRINKS CONTAINING ALCOHOL DO YOU HAVE ON A TYPICAL DAY: 1 OR 2
HOW OFTEN DO YOU HAVE SIX OR MORE DRINKS ON ONE OCCASION: NEVER
HOW OFTEN DO YOU HAVE A DRINK CONTAINING ALCOHOL: 4 OR MORE TIMES A WEEK
HAS A RELATIVE, FRIEND, DOCTOR, OR ANOTHER HEALTH PROFESSIONAL EXPRESSED CONCERN ABOUT YOUR DRINKING OR SUGGESTED YOU CUT DOWN: NO
HOW MANY STANDARD DRINKS CONTAINING ALCOHOL DO YOU HAVE ON A TYPICAL DAY: 1 OR 2
SKIP TO QUESTIONS 9-10: 1
HOW OFTEN DO YOU HAVE SIX OR MORE DRINKS ON ONE OCCASION: NEVER
SKIP TO QUESTIONS 9-10: 1
HOW OFTEN DO YOU HAVE A DRINK CONTAINING ALCOHOL: 2-4 TIMES A MONTH

## 2025-02-06 ASSESSMENT — PAIN SCALES - GENERAL: PAINLEVEL_OUTOF10: 0-NO PAIN

## 2025-02-17 ENCOUNTER — APPOINTMENT (OUTPATIENT)
Dept: UROLOGY | Facility: CLINIC | Age: 85
End: 2025-02-17
Payer: MEDICARE

## 2025-02-24 ASSESSMENT — ENCOUNTER SYMPTOMS
SHORTNESS OF BREATH: 0
FEVER: 0
ABDOMINAL PAIN: 0

## 2025-02-24 NOTE — PROGRESS NOTES
[facility]: [facility]  PROGRESS NOTE      Qamar Mckinney is a 84 y.o. male that is presenting today for Follow-up.    Assessment/Plan   Diagnoses and all orders for this visit:  Chronic atrial fibrillation (Multi)  Benign essential hypertension  -     CBC and Auto Differential; Future  -     Comprehensive Metabolic Panel; Future  -     TSH with reflex to Free T4 if abnormal; Future  Impaired fasting glucose  -     Hemoglobin A1C; Future  Pure hypercholesterolemia  BPH associated with nocturia  Mixed hyperlipidemia  -     Lipid Panel; Future  Hyperglycemia  -     Hemoglobin A1C; Future  Vitamin D deficiency  -     Vitamin D 25-Hydroxy,Total (for eval of Vitamin D levels); Future  Other orders  -     Follow Up In Primary Care - Established  -     Follow Up In Primary Care - Medicare Annual; Future    Hyperlipidemia controlled on atorvastatin.    Hypertension stable on the metoprolol.    Atrial fibrillation rate controlled anticoagulated, continue the Eliquis and the metoprolol.  He needs a new cardiologist is going to be establishing with Dr. Gaming.    Suspected lumbar spine stenosis.  To obtain MRI.  Epidural injection, spinal stimulator, another course of physical therapy are all options but he knows that getting the MRI done would be the best next step.  He certainly can continue to use Tylenol as needed for pain.    Subjective   HPI  84 y.o. male here for follow up.  Seems to be doing well other than his back pain.  He says that this is his biggest issue.  He saw pain management and an MRI of the back was ordered but he is claustrophobic and he considered a couple other MRI machines that are open but still feels that he would have a hard time with it and he thinks he will either try it in the traditional close machine or perhaps take the alprazolam that was ordered by Dr. Maldonado.  In any case, I feel that he does need to get a picture taken so that we can give him some better advice.  It  does sound as if he has a story consistent with spinal stenosis given his back pain that is nonradiating and it seems to worsen when he has been standing and it is relieved by sitting or leaning forward to relieve pressure on his back.    Review of Systems   Constitutional:  Negative for fever.   Respiratory:  Negative for shortness of breath.    Cardiovascular:  Negative for chest pain.   Gastrointestinal:  Negative for abdominal pain.   All other systems reviewed and are negative.     Objective   Vitals:    02/25/25 1106   BP: 124/62   Pulse: 80   Temp: 35.8 °C (96.5 °F)   SpO2: 96%      Body mass index is 23.72 kg/m².  Physical Exam  Vitals reviewed.   Constitutional:       Appearance: Normal appearance.   Cardiovascular:      Rate and Rhythm: Normal rate and regular rhythm.      Heart sounds: No murmur heard.  Pulmonary:      Breath sounds: Normal breath sounds. No wheezing, rhonchi or rales.   Musculoskeletal:      Right lower leg: No edema.      Left lower leg: No edema.       Diagnostic Results   Lab Results   Component Value Date    GLUCOSE 101 (H) 09/03/2024    CALCIUM 9.2 09/03/2024     09/03/2024    K 4.1 09/03/2024    CO2 25 09/03/2024     09/03/2024    BUN 19 09/03/2024    CREATININE 1.10 09/03/2024     Lab Results   Component Value Date    ALT 8 09/03/2024    AST 15 09/03/2024    ALKPHOS 104 09/03/2024    BILITOT 0.8 09/03/2024     Lab Results   Component Value Date    WBC 7.3 09/03/2024    HGB 13.4 (L) 09/03/2024    HCT 42.2 09/03/2024    MCV 94 09/03/2024     09/03/2024     Lab Results   Component Value Date    CHOL 143 09/03/2024    CHOL 148 12/01/2023    CHOL 158 09/06/2023     Lab Results   Component Value Date    HDL 51.0 09/03/2024    HDL 57.0 12/01/2023    HDL 63 09/06/2023     Lab Results   Component Value Date    LDLCALC 75 09/03/2024    LDLCALC 75 12/01/2023    LDLCALC 73 09/06/2023     Lab Results   Component Value Date    TRIG 83 09/03/2024    TRIG 80 12/01/2023    TRIG  "108 09/06/2023     No components found for: \"CHOLHDL\"  Lab Results   Component Value Date    HGBA1C 5.3 09/03/2024     Other labs not included in the list above were reviewed either before or during this encounter.    History    Past Medical History:   Diagnosis Date    Adverse effect of anesthesia     had lightheadedness, unable to void, hypotension    Benign essential hypertension 04/04/2022    Benign prostatic hyperplasia without urinary obstruction 12/01/2023    Bilateral inguinal hernia without obstruction or gangrene 09/13/2023    CHF (congestive heart failure)     Chronic atrial fibrillation (Multi) 09/13/2023    Congestive heart failure 12/01/2023    EF 55% 4/22 borderline dilation.    DDD (degenerative disc disease), lumbar     Degenerative joint disease 09/13/2023    Eczema     Essential tremor     History of polymyalgia rheumatica 03/20/2024    DX 6/23 tx 6 months, off Prednisone end of 12/23 inflammatory markers normal.    History of TIA (transient ischemic attack) 09/13/2023    IFG (impaired fasting glucose)     Nephrolithiasis     Onychomycosis     Osteoarthritis     Osteoarthritis of both knees 03/18/2021    Other conditions influencing health status     Nephrolithiasis    Polymyalgia rheumatica (Multi)     Spondylosis without myelopathy or radiculopathy, lumbosacral region     Unspecified abdominal hernia without obstruction or gangrene     Hernia     Past Surgical History:   Procedure Laterality Date    CATARACT EXTRACTION Left     KNEE ARTHROPLASTY Right     4/1/24    OTHER SURGICAL HISTORY Left     Hernia repair     Family History   Problem Relation Name Age of Onset    No Known Problems Mother      Colon cancer Father       Social History     Socioeconomic History    Marital status:      Spouse name: Not on file    Number of children: Not on file    Years of education: Not on file    Highest education level: Not on file   Occupational History    Not on file   Tobacco Use    Smoking status: " Never     Passive exposure: Never    Smokeless tobacco: Never   Vaping Use    Vaping status: Never Used   Substance and Sexual Activity    Alcohol use: Yes     Alcohol/week: 2.0 - 3.0 standard drinks of alcohol     Types: 2 - 3 Glasses of wine per week     Comment: wine    Drug use: Never    Sexual activity: Defer   Other Topics Concern    Not on file   Social History Narrative    Not on file     Social Drivers of Health     Financial Resource Strain: Low Risk  (4/1/2024)    Overall Financial Resource Strain (CARDIA)     Difficulty of Paying Living Expenses: Not very hard   Food Insecurity: No Food Insecurity (4/2/2024)    Hunger Vital Sign     Worried About Running Out of Food in the Last Year: Never true     Ran Out of Food in the Last Year: Never true   Transportation Needs: No Transportation Needs (4/1/2024)    PRAPARE - Transportation     Lack of Transportation (Medical): No     Lack of Transportation (Non-Medical): No   Physical Activity: Not on file   Stress: Not on file   Social Connections: Not on file   Intimate Partner Violence: Not on file   Housing Stability: Low Risk  (4/1/2024)    Housing Stability Vital Sign     Unable to Pay for Housing in the Last Year: No     Number of Places Lived in the Last Year: 1     Unstable Housing in the Last Year: No     Allergies   Allergen Reactions    Furosemide Other     Lightheadedness, BP Drop     Current Outpatient Medications on File Prior to Visit   Medication Sig Dispense Refill    acetaminophen (Tylenol) 500 mg tablet Take 1 tablet (500 mg) by mouth every 6 hours if needed for mild pain (1 - 3).      apixaban (Eliquis) 5 mg tablet Take 1 tablet (5 mg) by mouth 2 times a day. AS DIRECTED 180 tablet 3    atorvastatin (Lipitor) 10 mg tablet Take 1 tablet (10 mg) by mouth once daily. 90 tablet 3    cholecalciferol (Vitamin D-3) 25 MCG (1000 UT) capsule Take 1 capsule (25 mcg) by mouth once daily. TAKE AS DIRECTED      metoprolol succinate XL (Toprol-XL) 25 mg 24 hr  tablet Take 1 tablet (25 mg) by mouth once daily. Do not crush or chew. 90 tablet 3    [DISCONTINUED] polyethylene glycol (Glycolax, Miralax) 17 gram packet Take 17 g by mouth once daily as needed (constipation).       No current facility-administered medications on file prior to visit.     Immunization History   Administered Date(s) Administered    Flu vaccine, quadrivalent, high-dose, preservative free, age 65y+ (FLUZONE) 10/26/2020, 11/20/2023    Flu vaccine, trivalent, preservative free, HIGH-DOSE, age 65y+ (Fluzone) 10/24/2018, 11/15/2024    Influenza, Seasonal, Quadrivalent, Adjuvanted 10/25/2021    Influenza, Unspecified 09/28/2009, 10/24/2018    Influenza, injectable, quadrivalent 09/26/2019    Influenza, seasonal, injectable 09/08/2015, 09/21/2015    Moderna COVID-19 vaccine, bivalent, blue cap/gray label *Check age/dose* 09/28/2022    Pfizer COVID-19 vaccine, 12 years and older, (30mcg/0.3mL) (Comirnaty) 12/30/2023, 11/23/2024    Pfizer Purple Cap SARS-CoV-2 01/28/2021, 02/25/2021, 10/04/2021    Pneumococcal polysaccharide vaccine, 23-valent, age 2 years and older (PNEUMOVAX 23) 09/02/2013, 08/08/2014, 06/19/2018    RESPIRATORY SYNCYTIAL VIRUS (RSV), ELIGIBLE PREGNANT PTS, 0.5 ML (ABRYSVO) 10/24/2023    Zoster vaccine, recombinant, adult (SHINGRIX) 12/11/2018, 02/18/2019    Zoster, live 05/13/2022     Patient's medical history was reviewed and updated either before or during this encounter.       Gregorio Olea MD

## 2025-02-25 ENCOUNTER — OFFICE VISIT (OUTPATIENT)
Dept: PRIMARY CARE | Facility: CLINIC | Age: 85
End: 2025-02-25
Payer: MEDICARE

## 2025-02-25 VITALS
WEIGHT: 200 LBS | HEIGHT: 77 IN | DIASTOLIC BLOOD PRESSURE: 62 MMHG | OXYGEN SATURATION: 96 % | BODY MASS INDEX: 23.62 KG/M2 | HEART RATE: 80 BPM | SYSTOLIC BLOOD PRESSURE: 124 MMHG | TEMPERATURE: 96.5 F

## 2025-02-25 DIAGNOSIS — R35.1 BPH ASSOCIATED WITH NOCTURIA: ICD-10-CM

## 2025-02-25 DIAGNOSIS — E78.00 PURE HYPERCHOLESTEROLEMIA: ICD-10-CM

## 2025-02-25 DIAGNOSIS — R73.01 IMPAIRED FASTING GLUCOSE: ICD-10-CM

## 2025-02-25 DIAGNOSIS — I10 BENIGN ESSENTIAL HYPERTENSION: ICD-10-CM

## 2025-02-25 DIAGNOSIS — I48.20 CHRONIC ATRIAL FIBRILLATION (MULTI): Primary | ICD-10-CM

## 2025-02-25 DIAGNOSIS — R73.9 HYPERGLYCEMIA: ICD-10-CM

## 2025-02-25 DIAGNOSIS — N40.1 BPH ASSOCIATED WITH NOCTURIA: ICD-10-CM

## 2025-02-25 DIAGNOSIS — E55.9 VITAMIN D DEFICIENCY: ICD-10-CM

## 2025-02-25 DIAGNOSIS — E78.2 MIXED HYPERLIPIDEMIA: ICD-10-CM

## 2025-02-25 PROCEDURE — 1036F TOBACCO NON-USER: CPT | Performed by: INTERNAL MEDICINE

## 2025-02-25 PROCEDURE — 3078F DIAST BP <80 MM HG: CPT | Performed by: INTERNAL MEDICINE

## 2025-02-25 PROCEDURE — 1125F AMNT PAIN NOTED PAIN PRSNT: CPT | Performed by: INTERNAL MEDICINE

## 2025-02-25 PROCEDURE — 1159F MED LIST DOCD IN RCRD: CPT | Performed by: INTERNAL MEDICINE

## 2025-02-25 PROCEDURE — 99214 OFFICE O/P EST MOD 30 MIN: CPT | Performed by: INTERNAL MEDICINE

## 2025-02-25 PROCEDURE — 3074F SYST BP LT 130 MM HG: CPT | Performed by: INTERNAL MEDICINE

## 2025-02-25 ASSESSMENT — PATIENT HEALTH QUESTIONNAIRE - PHQ9
SUM OF ALL RESPONSES TO PHQ9 QUESTIONS 1 AND 2: 0
1. LITTLE INTEREST OR PLEASURE IN DOING THINGS: NOT AT ALL
2. FEELING DOWN, DEPRESSED OR HOPELESS: NOT AT ALL

## 2025-02-25 ASSESSMENT — ENCOUNTER SYMPTOMS
LOSS OF SENSATION IN FEET: 0
DEPRESSION: 0
OCCASIONAL FEELINGS OF UNSTEADINESS: 1

## 2025-02-25 ASSESSMENT — PAIN SCALES - GENERAL: PAINLEVEL_OUTOF10: 6

## 2025-03-10 ENCOUNTER — APPOINTMENT (OUTPATIENT)
Age: 85
End: 2025-03-10
Payer: MEDICARE

## 2025-04-27 NOTE — PROGRESS NOTES
Patient is a 84 y.o. male presenting for followup with [facility] of BPH status post Greenlight PVP in May 2024.    SUBJECTIVE:  HPI   He presents for followup.  He is status post Greenlight PVP May 2024.  He states that he has had significant improvement of his urinary frequency and urgency. He also reports improved sleep due to decrease nocturia. He does not experience urinary dribbling since his procedure. He has had retrograde ejaculation.    He reports weaker erections.     Past Medical History:   Diagnosis Date    Adverse effect of anesthesia     had lightheadedness, unable to void, hypotension    Benign essential hypertension 04/04/2022    Benign prostatic hyperplasia without urinary obstruction 12/01/2023    Bilateral inguinal hernia without obstruction or gangrene 09/13/2023    CHF (congestive heart failure)     Chronic atrial fibrillation (Multi) 09/13/2023    Congestive heart failure 12/01/2023    EF 55% 4/22 borderline dilation.    DDD (degenerative disc disease), lumbar     Degenerative joint disease 09/13/2023    Eczema     Essential tremor     History of polymyalgia rheumatica 03/20/2024    DX 6/23 tx 6 months, off Prednisone end of 12/23 inflammatory markers normal.    History of TIA (transient ischemic attack) 09/13/2023    IFG (impaired fasting glucose)     Nephrolithiasis     Onychomycosis     Osteoarthritis     Osteoarthritis of both knees 03/18/2021    Other conditions influencing health status     Nephrolithiasis    Polymyalgia rheumatica (Multi)     Spondylosis without myelopathy or radiculopathy, lumbosacral region     Unspecified abdominal hernia without obstruction or gangrene     Hernia      Past Surgical History:   Procedure Laterality Date    CATARACT EXTRACTION Left     KNEE ARTHROPLASTY Right     4/1/24    OTHER SURGICAL HISTORY Left     Hernia repair      Family History   Problem Relation Name Age of Onset    No Known Problems Mother      Colon cancer Father        Social History      Socioeconomic History    Marital status:    Tobacco Use    Smoking status: Never     Passive exposure: Never    Smokeless tobacco: Never   Vaping Use    Vaping status: Never Used   Substance and Sexual Activity    Alcohol use: Yes     Alcohol/week: 2.0 - 3.0 standard drinks of alcohol     Types: 2 - 3 Glasses of wine per week     Comment: wine    Drug use: Never    Sexual activity: Defer     Social Drivers of Health     Financial Resource Strain: Low Risk  (4/1/2024)    Overall Financial Resource Strain (CARDIA)     Difficulty of Paying Living Expenses: Not very hard   Food Insecurity: No Food Insecurity (4/2/2024)    Hunger Vital Sign     Worried About Running Out of Food in the Last Year: Never true     Ran Out of Food in the Last Year: Never true   Transportation Needs: No Transportation Needs (4/1/2024)    PRAPARE - Transportation     Lack of Transportation (Medical): No     Lack of Transportation (Non-Medical): No   Housing Stability: Low Risk  (4/1/2024)    Housing Stability Vital Sign     Unable to Pay for Housing in the Last Year: No     Number of Places Lived in the Last Year: 1     Unstable Housing in the Last Year: No        Review of Systems   Constitutional: denies any unintentional weight loss or change in strength.  Integumentary: denies any rashes or pruritus.  Eyes: denies any double vision or eye pain.  Ear/Nose/Mouth/Throat: denies any nosebleeds or gum bleeds.  Cardiovascular: denies any chest pain or syncope.  Respiratory: denies hemoptysis.  Gastrointestinal: denies nausea or vomiting.  Musculoskeletal: denies muscle cramping or weakness.  Neurologic: denies convulsions or seizures.  Hematologic/Lymphatic: denies bleeding tendencies.  Endocrine: denies heat/cold intolerance.  All other systems have been reviewed and are negative unless otherwise noted in the HPI.    OBJECTIVE:  There were no vitals taken for this visit.  Physical Exam   Constitutional: No obvious distress.  Eyes:  Non-injected conjunctiva, sclera clear, EOMI.  Ears/Nose/Mouth/Throat: No obvious drainage per ears or nose.  Cardiovascular: Extremities are warm and well perfused. No edema, cyanosis or pallor.  Respiratory: No audible wheezing/stridor; respirations do not appear labored.  Gastrointestinal: Abdomen soft, not distended.  Musculoskeletal: Normal ROM of extremities.  Skin: No obvious rashes or open sores.  Neurologic: Alert and oriented, CN 2-12 grossly intact.  Psychiatric: Answers questions appropriately with normal affect.  Hematologic/Lymphatic/Immunologic: No obvious bruises or sites of spontaneous bleeding.  Genitourinary: No CVA tenderness, bladder not palpable.     Labs:  Lab Results   Component Value Date    WBC 7.3 09/03/2024    HGB 13.4 (L) 09/03/2024    HCT 42.2 09/03/2024    MCV 94 09/03/2024     09/03/2024    GLUCOSE 101 (H) 09/03/2024    CALCIUM 9.2 09/03/2024     09/03/2024    K 4.1 09/03/2024    CO2 25 09/03/2024     09/03/2024    BUN 19 09/03/2024    CREATININE 1.10 09/03/2024    EGFR 67 09/03/2024    URINECULTURE No growth 05/03/2024     IMAGING:  PROCEDURES:  PVR 2 mL   4/28/25    ASSESSMENT/PLAN:  Problem List Items Addressed This Visit       BPH associated with nocturia - Primary     Other Visit Diagnoses         Urinary symptom or sign        Relevant Orders    Measure post void residual (Completed)    POCT UA Automated manually resulted (Completed)      Microscopic hematuria        Relevant Orders    Urine Culture    Microscopic Only, Urine            He has a history of BPH with urinary urgency, dribbling, nocturia and frequency.  He was treated with Greenlight PVP in May 2024. He has had significant improvement to his urinary symptoms since his procedure.   He has a history of urinary retention which has improved. PVR was 2 mL on bladder scan today (4/28/25).    He has erectile dysfunction. We discussed treatment with sildenafil 100 mg. Adverse effects including flushing  and headache were reviewed. Dosage instruction were reviewed. He will call the office if he elects to start treatment.    UA had small bilirubin and trace blood today and will be sent for microscopy, culture, and sensitivity.    All questions were answered to the patient’s satisfaction.  Patient agrees with the plan and wishes to proceed.  Follow-up will be scheduled appropriately.     Scribe Attestation  By signing my name below, I, Saima Li,   attest that this documentation has been prepared under the direction and in the presence of Serafin Payan MD.

## 2025-04-28 ENCOUNTER — APPOINTMENT (OUTPATIENT)
Dept: UROLOGY | Facility: CLINIC | Age: 85
End: 2025-04-28
Payer: MEDICARE

## 2025-04-28 DIAGNOSIS — R35.1 BPH ASSOCIATED WITH NOCTURIA: Primary | ICD-10-CM

## 2025-04-28 DIAGNOSIS — R39.9 URINARY SYMPTOM OR SIGN: ICD-10-CM

## 2025-04-28 DIAGNOSIS — N40.1 BPH ASSOCIATED WITH NOCTURIA: Primary | ICD-10-CM

## 2025-04-28 DIAGNOSIS — R31.29 MICROSCOPIC HEMATURIA: ICD-10-CM

## 2025-04-28 LAB
POC APPEARANCE, URINE: CLEAR
POC BILIRUBIN, URINE: ABNORMAL
POC BLOOD, URINE: ABNORMAL
POC COLOR, URINE: YELLOW
POC GLUCOSE, URINE: NEGATIVE MG/DL
POC KETONES, URINE: NEGATIVE MG/DL
POC LEUKOCYTES, URINE: NEGATIVE
POC NITRITE,URINE: NEGATIVE
POC PH, URINE: 5 PH
POC PROTEIN, URINE: NEGATIVE MG/DL
POC SPECIFIC GRAVITY, URINE: >=1.03
POC UROBILINOGEN, URINE: 0.2 EU/DL

## 2025-04-28 PROCEDURE — 99213 OFFICE O/P EST LOW 20 MIN: CPT | Performed by: UROLOGY

## 2025-04-28 PROCEDURE — 81003 URINALYSIS AUTO W/O SCOPE: CPT | Performed by: UROLOGY

## 2025-04-28 PROCEDURE — 1160F RVW MEDS BY RX/DR IN RCRD: CPT | Performed by: UROLOGY

## 2025-04-28 PROCEDURE — 1036F TOBACCO NON-USER: CPT | Performed by: UROLOGY

## 2025-04-28 PROCEDURE — 1159F MED LIST DOCD IN RCRD: CPT | Performed by: UROLOGY

## 2025-04-28 PROCEDURE — 1126F AMNT PAIN NOTED NONE PRSNT: CPT | Performed by: UROLOGY

## 2025-04-28 PROCEDURE — G2211 COMPLEX E/M VISIT ADD ON: HCPCS | Performed by: UROLOGY

## 2025-04-28 ASSESSMENT — PAIN SCALES - GENERAL: PAINLEVEL_OUTOF10: 0-NO PAIN

## 2025-04-30 LAB
BACTERIA #/AREA URNS HPF: ABNORMAL /HPF
BACTERIA UR CULT: NORMAL
CAOX CRY #/AREA URNS HPF: ABNORMAL /HPF
HYALINE CASTS #/AREA URNS LPF: ABNORMAL /LPF
RBC #/AREA URNS HPF: ABNORMAL /HPF
SERVICE CMNT-IMP: ABNORMAL
SQUAMOUS #/AREA URNS HPF: ABNORMAL /HPF
WBC #/AREA URNS HPF: ABNORMAL /HPF

## 2025-08-08 ENCOUNTER — APPOINTMENT (OUTPATIENT)
Age: 85
End: 2025-08-08
Payer: MEDICARE

## 2025-08-23 DIAGNOSIS — M54.9 BACK PAIN, UNSPECIFIED BACK LOCATION, UNSPECIFIED BACK PAIN LATERALITY, UNSPECIFIED CHRONICITY: Primary | ICD-10-CM

## 2025-08-23 DIAGNOSIS — E55.9 VITAMIN D DEFICIENCY: ICD-10-CM

## 2025-08-23 DIAGNOSIS — E78.2 MIXED HYPERLIPIDEMIA: ICD-10-CM

## 2025-08-23 DIAGNOSIS — R73.9 HYPERGLYCEMIA: ICD-10-CM

## 2025-08-23 DIAGNOSIS — R73.01 IMPAIRED FASTING GLUCOSE: ICD-10-CM

## 2025-08-23 DIAGNOSIS — I10 BENIGN ESSENTIAL HYPERTENSION: ICD-10-CM

## 2026-04-27 ENCOUNTER — APPOINTMENT (OUTPATIENT)
Dept: UROLOGY | Facility: CLINIC | Age: 86
End: 2026-04-27
Payer: MEDICARE

## (undated) DEVICE — CATHETER, URETHRAL, FOLEY, 2 WAY, BARDEX LUBRICATH, SHORT LENGTH, 16 FR, 5 CC, LATEX

## (undated) DEVICE — IRRIGATION SET, CYSTOSCOPY, F/CONSTANT/INTERMITTENT, 8 GTT/CC, 77 IN

## (undated) DEVICE — SKIN CLOSURE SYS, PREMIERPRO EXOFIN, 1-4CM X 22CM, 1.75G TUBE

## (undated) DEVICE — BAG, DRAINAGE, CONTINUOUS IRRIGATION, 4L

## (undated) DEVICE — TIP, SUCTION, FRAZIER, W/CONTROL VENT, 12 FR

## (undated) DEVICE — BAG, DRAINAGE, ANTI-REFLUX CHAMBER, 2000ML

## (undated) DEVICE — SUTURE, STRATAFIX, SYMMETRIC PDS PLUS, SIZE 1, 12IN, VIOLET. CT1 36MM

## (undated) DEVICE — BLADE, RECIPROCATING, LARGE, 12.7MM

## (undated) DEVICE — Device

## (undated) DEVICE — TUBING, IRRIGATION, HIGH FLOW, HAND PIECE SET

## (undated) DEVICE — BANDAGE, ELASTIC, ACE, ACE, DOUBLE LENGTH, 6 X 550 IN, LF

## (undated) DEVICE — BLADE, SAW, SAGITTAL, 18.0 X 1.27 X 90MM

## (undated) DEVICE — GLOVE, PROTEXIS PI CLASSIC, SZ-7.5, PF, LF

## (undated) DEVICE — FIBER, MOXY, LIQUID COOLED

## (undated) DEVICE — GLOVE, SURGICAL, PROTEXIS PI ORTHO, 8.0, PF, LF

## (undated) DEVICE — DRAPE, INSTRUMENT, W/POUCH, STERI DRAPE, 7 X 11 IN, DISPOSABLE, STERILE

## (undated) DEVICE — SUTURE, VICRYL, 1, 27 IN, CT-1, VIOLET

## (undated) DEVICE — SUTURE, MONOCRYL, 2-0, 36 IN, CT-1, UNDYED

## (undated) DEVICE — BANDAGE, COFLEX, 6 X 5 YDS, TAN, STERILE, LF

## (undated) DEVICE — SOLUTION, INJECTION, SODIUM CHLORIDE 9%, 3000ML

## (undated) DEVICE — GLOVE, SURGICAL, PROTEXIS PI BLUE W/NEUTHERA, 8.0, PF, LF

## (undated) DEVICE — 20FR, 2-WAY, 30CC BARDEX LUBRICATH FOLEY CATHETER

## (undated) DEVICE — DRAPE, SHEET, U, W/ADHESIVE STRIP, IMPERVIOUS, 60 X 70 IN, DISPOSABLE, LF, STERILE

## (undated) DEVICE — MARKER, SURGICAL, SKIN, STANDARD, W/RULER, LF

## (undated) DEVICE — HOOD, STERISHIELD T4 SYSTEM

## (undated) DEVICE — PREP TRAY, SKIN, DRY, W/GLOVES